# Patient Record
(demographics unavailable — no encounter records)

---

## 2024-10-14 NOTE — REVIEW OF SYSTEMS
[Fatigue] : fatigue [Diarrhea: Grade 0] : Diarrhea: Grade 0 [Joint Pain] : joint pain [Muscle Pain] : muscle pain [Hot Flashes] : hot flashes [Fever] : no fever [Chest Pain] : no chest pain [Palpitations] : no palpitations [Lower Ext Edema] : no lower extremity edema [Shortness Of Breath] : no shortness of breath [Cough] : no cough [Abdominal Pain] : no abdominal pain [Vomiting] : no vomiting [Constipation] : no constipation [Dysuria] : no dysuria [Skin Rash] : no skin rash [Difficulty Walking] : no difficulty walking

## 2024-10-14 NOTE — PHYSICAL EXAM
[Fully active, able to carry on all pre-disease performance without restriction] : Status 0 - Fully active, able to carry on all pre-disease performance without restriction [Normal] : affect appropriate [de-identified] : anicteric  [de-identified] : no edema

## 2024-10-14 NOTE — ASSESSMENT
[FreeTextEntry1] : 59 year old male with dileep 4+3 prostate adenocarcinoma with intraductal and cribriform features, PSA 6.23  in Feb 2024, PSMA PET positive nodes PSMA PET 5/14/24 showed 0.6x0.3cm left para-aortic/common iliac node SUV 7.0, a 5mm left common iliac node SUV 5.8, and a 0.6x0.6cm left external iliac node SUV 10.1. He has at least stage FARIDA prostate adenocarcinoma (up to [place] 7 (4+3). PSMA PET 5/14/24 showed several SUV positive sub-centimeter pelvic lymph nodes. This indicates very high risk feature for his newly diagnosed prostate cancer. The plan is to proceed with radiation and anti- hormone therapy. The recommended treatment approach in this setting (node positive disease) based on the STAMPEDE trial is adding 2 year abiraterone (+prednisone) to ADT plus RT as an option for patients with very high risk prostate cancer. Patient had been started on Orgovyx on 5/20/24, he continued for two weeks and then received first dose of Lupron on 6/5/24. He started abiraterone and prednisone in June 2024.  Recommend genetic testing given he has node positive disease. Based on his FRAX score 10 years of probability of hip fracture 0.5%, a 10-year probability of a major osteoporosis-related fracture 6%. He does not require DEXA scan and bisphosphate vs prolia.   Plan: Very high risk of prostate cancer with pelvic nodes - Continue Lupron [id number]. due today 9/4/24; next dose on 12/4/2024 -  Previously on Abiraterone 1000mg and Prednisone, however, was held on 7/1/24 for 2 weeks, in the context of Gr      II LFTs on 6/28 , , bili 1.6 prompting dose reduction to 750mg. - Continue Abiraterone 750mg. + Prednisone. - Reviewed abiraterone AEs with patient, including but not limited to: fatigue, leg edema, hypertension,     hypernatremia, hypokalemia, liver damage, hot flashes, decreased libido.  Transaminitis -  Reviewed 8/31/24 LFT with pt/spouse noting AST-59, ALT - 94, T.B - 1.4; will continue to monitor < GR 1 - Continue to monitor LFT in 3 weeks  Vasomotor Sx - Continue to follow with Radiation Oncology, Dr. Nielsen on September 12, 2024 @ 10AM]. - Continue to monitor vasomotor symptoms as patient does not wish to start medication at presently. -  Continue Cialis for decreased libido / erectile dysfunction, f/u Dr. Héctor Ren, denies gabapentin and duloxetin  RTC 3 weeks

## 2024-10-14 NOTE — HISTORY OF PRESENT ILLNESS
[Disease: _____________________] : Disease: [unfilled] [T: ___] : T[unfilled] [N: ___] : N[unfilled] [AJCC Stage: ____] : AJCC Stage: [unfilled] [de-identified] : 59 year old male who is presenting for initial medical oncology evaluation for prostate adenocarcinoma. He noted a significant rise in his PSA to 6.23 on 2/6/24 (previous 3.71 on 1/20/23 and 1.11 on 4/1/16). MRI 4/12/24 showed 4.4 x 2.9 x 4.1 cm = 27 cc gland, with dominant lesions in the left posterior medial-lateral (PZpm-pl) base-to-midgland peripheral zone (1.6 cm, GA-5), with concern for capsular involvement but no clear EPE, abutting the left NVB and SV without clear invasion, and left posterior medial (PZpm) [id number] peripheral zone lesion extending to the midline posterior urethra at the apex (0.9 cm, GA-4). No LAD. No major anatomic issues. Prostate biopsy 4/23/24 showed right posterior lateral base G 3+4 with 50% involvement, MRI target#1 left PZ G 4+3 among 3 cores with 60%, 55% and 55% involvement with intraductal component, MRI #2 target left PZ G 3+4 among 2 cores with 85% and 55% involvement with intraductal and cribriform component. (small prostate so limited number of sites per Dr. Umaña). PSMA PET 5/14/24 showed increased radiotracer activity in the left side of the prostate gland suggests intermediate PSA expression, with compatible metastatic disease indicated by 0.6x0.3cm left para-aortic/common iliac node SUV 7.0, a 5mm left common iliac node SUV 5.8, a 0.6x0.6cm left external iliac node SUV 10.1, PSA 7.96 and Testosterone on 272 5/20/24. He was started on Orgovyx by urology on 5/21/24   Interval History: Overall he has been doing well. He denies any issues with urination. No constipation or diarrhea. He reports that he is great. He remains active and goes to the gym 6 days per week, as well as, golf a couple days.    PMH/PSH: Appendectomy, hernia repair, testicular torsion repair, vasectomy, cervical spinal fusion; HTN (Edarbi, Amlodopine) /HLD (atorvastin)   Social: Never smoker, occasional alcohol use. 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 Has six children (age 31- 15 years)  Family History: Maternal Uncle with hx of bladder cancer    6/12/24: Patient is more fatigued than normal, says he that he normally goes to bed pretty early and is finding he wants to go to bed even earlier. He does note that since stopping  orgovyx and starting lupron and stephanie/pred the fatigue has actually improved. He was exhausted when taking orgovyx. He is having hot flashes throughout the day, says they are tolerable. Feels his metabolism may have slowed a bit, however has also been going to a lot of events (graduation parties etc) at which he is eating outside of his normal diet. He reports nocturia x 4, increased from 2-3. Endorses decreased libido, does find he is able to have erection with cialis. Has h/o cervical stenosis/fusion and had C2-C6 nerve block yesterday.   [de-identified] : G7(4+3) disease in intraductal component, 3/8 sampled sites  [de-identified] : 7/3/24: LFTs 6/28 gr II elevation, abiraterone held on 7/1, continued on prednisone. Overall patient has been feeling well, he has been motivated to exercise more due to the fact that he is on ADT and wants to counteract increased calorie intake / easier weight gain. He is doing his best to push through fatigue at certain times, but also takes a naps. He continues to follow with pain management and is planned for cervical nerve ablation. Ongoing vasomotor symptoms, says that a few nights ago he was up every hour with hot flashes, this is not the norm, discussed potential gabapentin, however patient has been on this medication previously and does not wish to start right now. Notes some urinary urgency and nocturia 2-4. Endorse little to no libido, does not think he has gotten spontaneous erections.    7/24/24 reports moderate fatigue, hot flash and sweating 10 times, loss of energy. Has normal urination.   8/16/24 pt is in general well-being. Appetite is good. maintaining weight. Exercise 1-2 times a day [work-out]. Continues to endorse moderate fatigue and drinks coffee which helps keep him active and about for about 1 hour. Hot flashes is a bit less but more often. No swelling or joint pain which he follows locally for management.  9/6/24 pt is doing well. Appetite is good and maintaining weight. BM is normal. Continues to exercise 1-2 times daily. Continues to endorse moderate fatigue and mild hot flash with occ sleep interruption.   10/14/24 reports mod fatigue, fluctuating hot flash and sweating

## 2024-10-28 NOTE — PROCEDURE
[FreeTextEntry1] : TRANSPERINEAL PLACEMENT OF SPACEOAR GEL AND MARKERS PLACEMENT  [FreeTextEntry2] :  IN PREPARATION FOR RADIATION TREATMENT FOR PROSTATE CANCER  [FreeTextEntry3] : Procedure Note:   Mario Reyna is a 60 year old patient with [place]jel[place] score 7 ( 4+3) adenocarcinoma of the prostate. He presents today for transperineal placement of Spaceoar gel and fiducials in preparation for radiation therapy for his prostate cancer treatment.    In preparation for the procedure, he self-administered an enema one hour before leaving home and was NPO the night before procedure. He was prescribed a 3-day course of oral antibiotics twice daily to be started a day prior to the procedure. Topical ANECREAM cream was applied to the perineal area 10 mins prior to the procedure. The patient was prescribed and took Valium 5 mg and Tylenol 650 mg upon arrival in the department one hour to procedure. Procedure risk and benefits were reviewed with patient and a written consent was obtained prior to procedure. A time out was observed with patient name, date of birth, procedure, position, and site verified.  The patient was placed in a lithotomy position. Chloral prep was used to prep the skin. While maintaining aseptic technique an ultrasound probe was inserted into the rectum to visualize the prostate. Less than 10 cc of Lidocaine 2% plus 8.4% sodium bicarbonate was injected subcutaneously. Afterwards, 20 cc of Lidocaine and sodium bicarbonate was injected internally at the prostate apex and bilateral neurovascular bundles for the nerve block.  Three fiducial markers were prepared on the sterile field. One fiducial marker was placed into each of the following sites: left lobe, right lobe and apex via 14 -gauge needles under ultrasound guidance.  Next, the hydrogel spacer kit was opened onto the sterile field and the hydrogel injection apparatus was prepared. An 18-gauge needle was positioned into the mid-line perirectal fat between the anterior rectal wall and prostate under ultrasound guidance. Less than 10 cc of saline was injected via the needle to hydro dissect the space and confirm proper placement in both axial and sagittal views. The syringe was aspirated to confirm the needle was extravascular. The syringe was replaced with the hydrogel injection apparatus and the gel was injected over about 10 seconds. The needle was then removed. There was minimal blood loss. The patient tolerated the procedure well.  The patient was transferred to the recovery area on a monitor. Vital signs were stable. He tolerated fluid and snacks by mouth and was made comfortable. He denied pain. Post procedure verbal and written instructions were given and reviewed with patient and wife. CT-Sim date and bowel prep reviewed with patient.They verbalized understanding of instructions. He was then discharged home in a stable condition, ambulatory and accompanied by wife.

## 2024-11-05 NOTE — HISTORY OF PRESENT ILLNESS
[FreeTextEntry1] : Mr. Maynard is a 60-year-old male who presents for follow up visit prior to SIM planning scan today as he had seen Dr. Nielsen but will get radiation therapy at [facility]. He is accompanied by his wife for today's treatment.   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 [place] score 4+3=7 in 1 site/3 cores, Carcinoma is seen involving adipose tissue, consistent with extraprostatic extension. Intraductal carcinoma of the prostate also present. Perineural invasion present. Anjelica score 3+4=7 in 2 sites/3 cores, Atypical intraductal proliferation (AIP) and Intraductal carcinoma of the prostate also present. 85% max involvement. Cribriform [place] pattern 4 is present.  PSA 6.23 ng/mL. PSMA PET with few subcentimeter retroperitoneal and left pelvic lymph nodes with increased radiotracer activity are compatible with metastatic disease.  TREATMENT: 5/21/24 - Received Orgovyx x 2 weeks  6/5/24 - started on Lupron/Eligard injections  6/2024 - started on Abiraterone/Prednisone (dose reduced due to elevated LFTs)  PSA Trend: 1/20/23 - 3.71 ng/mL 2/6/24 - 6.23 5/20/24 - 7.96 6/30/24 - 1.67 7/17/24 - 1.15 8/9/24 - 0.64 8/31/24 - 0.39 9/19/24 - 0.32 10/12/24 - 0.22 11/4/24 - 0.19  HPI:  Mr. Maynard originally presented to urology in March 2024 for evaluation of elevated PSA of 6.23 ng/mL. MRI ordered.   4/12/24 - Prostate MRI: Prostate volume 27 cc. PSA density: 0.23 ng/mL/mL. Lesion #1, Left, posterior medial-lateral (PZpm-pl), base-to-midgland, peripheral zone (16 x 9 x 15 mm). Tumor abuts capsule causing capsular irregularity. PIRADS 5 Lesion #2, Left, posterior medial (PZpm), afofsbux-[id number], peripheral zone lesion extends to the midline posterior urethra at the apex (7 x 3 x 9 mm). No extraprostatic extension noted. PIRADS 4 Lesion 1 abuts the left neurovascular bundle. Lesion 1 abuts the left seminal vesicle without definite invasion. No pelvic lymphadenopathy and no suspicious bone lesions identified.    4/23/24 - underwent Prostate Biopsy: Pathology - Adenocarcinoma of Prostate, 3/8 sites and 6/11 cores involved.  Anjelica score 4+3=7 in 1 site/3 cores (MRI target lesion #1), Carcinoma is seen involving adipose tissue, consistent with extraprostatic extension. Intraductal carcinoma of the prostate also present. Perineural invasion present.  [place] score 3+4=7 in 2 sites/3 cores (MRI target lesion and right posterior lateral base), Atypical intraductal proliferation (AIP) and Intraductal carcinoma of the prostate also present. 85% max involvement.  Note: Cribriform [place] pattern 4 is present.    Mr. Maynard saw Dr. Umaña (urology) in follow up and biopsy results were reviewed.  Reviewed that definitive treatment would be recommended given the biopsy results.  PSMA PET ordered. Referrals to urology surgeon and radiation oncology made.  He then was seen by Dr. Pollock (urology) and Dr. Diamond ([facility]) in multidisciplinary clinic on 5/9/24, both treatment options were reviewed in detail.  Final decision about treatment to be made after PSMA PET.   5/14/24 - PSMA PET: 1. Foci of increased radiotracer activity in left side of prostate gland, extending from apex to base, corresponding to lesions identified on MRI/biopsy-proven prostate carcinoma. The intensity of radiotracer activity suggests intermediate PSMA expression. 2. Few subcentimeter retroperitoneal and left pelvic lymph nodes with increased radiotracer activity are compatible with metastatic disease.  5/22/24 - saw Dr. Rice ([facility]) in consultation. Discussed all imaging and pathology results.  He has newly diagnosed at least stage FARIDA prostate adenocarcinoma (up to [place]jel[place] 7 (4+3). PSMA PET 5/14/24 showed several SUV positive sub-centimeter pelvic lymph nodes. This indicates very high-risk feature for his newly diagnosed prostate cancer. The plan is to proceed with radiation and anti- hormone therapy. The recommended treatment approach in this setting (node positive disease) based on the STAMPEDE trial is adding 2-year abiraterone (+prednisone) to ADT plus RT as an option for patients with very high-risk prostate cancer.    6/2024 - started on Lupron/Eligard injections and Abiraterone/Prednisone with Dr. Rice ([facility])  7/1/24 - saw Dr. Nielsen ([facility]) in consultation as Dr. Diamond had left the practice.  Recommended external beam radiation therapy to the prostate, in addition to continuing hormone therapy, based on the STAMPEDE study findings. The radiation therapy would be given for 5 weeks (25 treatments) after observing the PSA levels on hormone therapy (typically 4-6 months). Will coordinate with the radiation oncology team at the [facility] in [place] for the patient's radiation treatment.    10/29/24 - Had Prostate Fiducial Markers/SpaceOAR Gel placed by Dr. Nielsen ([facility]) in preparation for radiation therapy.    11/5/24 - presents for follow up visit prior to SIM planning scan as he is to receive his radiation at [facility].  Mr. Maynard is feeling well today, appropriately anxious about next steps. He has nocturia of 4x (wakes due to hot flashes so urinates as well), frequency, and occasional urgency. No bowel issues, last colonoscopy was within the past year.  Poor erectile function has seen Dr. Ren. He continues to follow with Dr. Rice ([facility]) and is on Lupron injections and Abiraterone/Prednisone, as per patient told to hold Abiraterone due to elevated LFTs.  He is having hot flashes, decreased energy, and some depression since starting on Lupron injections and Abiraterone/Prednisone. He is looking forward to a trip to [place] at the end of December.

## 2024-11-05 NOTE — REVIEW OF SYSTEMS
[Fatigue] : fatigue [Loss of Hearing] : loss of hearing [Nocturia] : nocturia [Urinary Frequency] : urinary frequency [IPSS Score (0-40): ___] : IPSS score: [unfilled] [EPIC-CP Score (0-60): ___] : EPIC-CP score: [unfilled] [Joint Pain] : joint pain [Anxiety] : anxiety [Depression] : depression [Hot Flashes] : hot flashes [Negative] : Heme/Lymph [Constipation: Grade 0] : Constipation: Grade 0 [Diarrhea: Grade 0] : Diarrhea: Grade 0 [Hematuria: Grade 0] : Hematuria: Grade 0 [Urinary Incontinence: Grade 0] : Urinary Incontinence: Grade 0  [Urinary Retention: Grade 0] : Urinary Retention: Grade 0 [Urinary Tract Pain: Grade 0] : Urinary Tract Pain: Grade 0 [Urinary Urgency: Grade 0] : Urinary Urgency: Grade 0 [Urinary Frequency: Grade 1 - Present] : Urinary Frequency: Grade 1 - Present [Erectile Dysfunction: Grade 2 - Decrease in erectile function (frequency/rigidity of erections), erectile intervention indicated, (e.g., medication or mechanical devices such as penile pump)] : Erectile Dysfunction: Grade 2 - Decrease in erectile function (frequency/rigidity of erections), erectile intervention indicated, (e.g., medication or mechanical devices such as penile pump) [Ejaculation Disorder: Grade 2 - Anejaculation or retrograde ejaculation] : Ejaculation Disorder: Grade 2 - Anejaculation or retrograde ejaculation [Suicidal] : not suicidal [FreeTextEntry3] : wears contact lenses  [FreeTextEntry4] : loss of hearing left ear  [FreeTextEntry8] : QOL 3  [FreeTextEntry9] : chornic  [de-identified] : situational, especially with new diagnosis  [FreeTextEntry5] : occasional

## 2024-11-05 NOTE — PHYSICAL EXAM
[Sclera] : the sclera and conjunctiva were normal [Outer Ear] : the ears and nose were normal in appearance [Normal] : no respiratory distress, lungs were clear to auscultation bilaterally

## 2024-11-05 NOTE — DISEASE MANAGEMENT
[0-10] : 0 -10 ng/mL [Biopsy with Fusion] : Patient had a biopsy with fusion on [7(3+4)] : Template Biopsy [place] Score: 7(3+4) [7(4+3)] : Fusion Biopsy [place] Score: 7(4+3) [] : Patient had a Prostate MRI [5] : 5 [Treatment with androgen ablation] : Treatment with androgen ablation [FARIDA] : FARIDA [BiopsyDate] : 04/23/2024 [MeasuredProstateVolume] : 27 [TotalCores] : 11 [TotalPositiveCores] : 6 [MaxCoreInvolvement] : 85 [ADTStartedDate] : 6/2024

## 2024-11-05 NOTE — DISEASE MANAGEMENT
[0-10] : 0 -10 ng/mL [Biopsy with Fusion] : Patient had a biopsy with fusion on [7(3+4)] : Template Biopsy [place] Score: 7(3+4) [7(4+3)] : Fusion Biopsy [place] Score: 7(4+3) [] : Patient had a Prostate MRI [5] : 5 [Treatment with androgen ablation] : Treatment with androgen ablation [FARIDA] : FARIDA [BiopsyDate] : 04/23/2024 [MeasuredProstateVolume] : 27 [TotalCores] : 11 [TotalPositiveCores] : 6 [MaxCoreInvolvement] : 85 [ADTStartedDate] : 6/2024

## 2024-11-05 NOTE — REVIEW OF SYSTEMS
[Fatigue] : fatigue [Loss of Hearing] : loss of hearing [Nocturia] : nocturia [Urinary Frequency] : urinary frequency [IPSS Score (0-40): ___] : IPSS score: [unfilled] [EPIC-CP Score (0-60): ___] : EPIC-CP score: [unfilled] [Joint Pain] : joint pain [Anxiety] : anxiety [Depression] : depression [Hot Flashes] : hot flashes [Negative] : Heme/Lymph [Constipation: Grade 0] : Constipation: Grade 0 [Diarrhea: Grade 0] : Diarrhea: Grade 0 [Hematuria: Grade 0] : Hematuria: Grade 0 [Urinary Incontinence: Grade 0] : Urinary Incontinence: Grade 0  [Urinary Retention: Grade 0] : Urinary Retention: Grade 0 [Urinary Tract Pain: Grade 0] : Urinary Tract Pain: Grade 0 [Urinary Urgency: Grade 0] : Urinary Urgency: Grade 0 [Urinary Frequency: Grade 1 - Present] : Urinary Frequency: Grade 1 - Present [Erectile Dysfunction: Grade 2 - Decrease in erectile function (frequency/rigidity of erections), erectile intervention indicated, (e.g., medication or mechanical devices such as penile pump)] : Erectile Dysfunction: Grade 2 - Decrease in erectile function (frequency/rigidity of erections), erectile intervention indicated, (e.g., medication or mechanical devices such as penile pump) [Ejaculation Disorder: Grade 2 - Anejaculation or retrograde ejaculation] : Ejaculation Disorder: Grade 2 - Anejaculation or retrograde ejaculation [Suicidal] : not suicidal [FreeTextEntry3] : wears contact lenses  [FreeTextEntry4] : loss of hearing left ear  [FreeTextEntry8] : QOL 3  [FreeTextEntry9] : chornic  [de-identified] : situational, especially with new diagnosis  [FreeTextEntry5] : occasional

## 2024-11-05 NOTE — HISTORY OF PRESENT ILLNESS
[FreeTextEntry1] : Mr. Maynard is a 60-year-old male who presents for follow up visit prior to SIM planning scan today as he had seen Dr. Nielsen but will get radiation therapy at [facility]. He is accompanied by his wife for today's treatment.   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 [place] score 4+3=7 in 1 site/3 cores, Carcinoma is seen involving adipose tissue, consistent with extraprostatic extension. Intraductal carcinoma of the prostate also present. Perineural invasion present. [place] score 3+4=7 in 2 sites/3 cores, Atypical intraductal proliferation (AIP) and Intraductal carcinoma of the prostate also present. 85% max involvement. Cribriform [place] pattern 4 is present.  PSA 6.23 ng/mL. PSMA PET with few subcentimeter retroperitoneal and left pelvic lymph nodes with increased radiotracer activity are compatible with metastatic disease.  TREATMENT: 5/21/24 - Received Orgovyx x 2 weeks  6/5/24 - started on Lupron/Eligard injections  6/2024 - started on Abiraterone/Prednisone (dose reduced due to elevated LFTs)  PSA Trend: 1/20/23 - 3.71 ng/mL 2/6/24 - 6.23 5/20/24 - 7.96 6/30/24 - 1.67 7/17/24 - 1.15 8/9/24 - 0.64 8/31/24 - 0.39 9/19/24 - 0.32 10/12/24 - 0.22 11/4/24 - 0.19  HPI:  Mr. Maynard originally presented to urology in March 2024 for evaluation of elevated PSA of 6.23 ng/mL. MRI ordered.   4/12/24 - Prostate MRI: Prostate volume 27 cc. PSA density: 0.23 ng/mL/mL. Lesion #1, Left, posterior medial-lateral (PZpm-pl), base-to-midgland, peripheral zone (16 x 9 x 15 mm). Tumor abuts capsule causing capsular irregularity. PIRADS 5 Lesion #2, Left, posterior medial (PZpm), [id number], peripheral zone lesion extends to the midline posterior urethra at the apex (7 x 3 x 9 mm). No extraprostatic extension noted. PIRADS 4 Lesion 1 abuts the left neurovascular bundle. Lesion 1 abuts the left seminal vesicle without definite invasion. No pelvic lymphadenopathy and no suspicious bone lesions identified.    4/23/24 - underwent Prostate Biopsy: Pathology - Adenocarcinoma of Prostate, 3/8 sites and 6/11 cores involved.  [place] score 4+3=7 in 1 site/3 cores (MRI target lesion #1), Carcinoma is seen involving adipose tissue, consistent with extraprostatic extension. Intraductal carcinoma of the prostate also present. Perineural invasion present.  [place] score 3+4=7 in 2 sites/3 cores (MRI target lesion and right posterior lateral base), Atypical intraductal proliferation (AIP) and Intraductal carcinoma of the prostate also present. 85% max involvement.  Note: Cribriform [place] pattern 4 is present.    Mr. Maynard saw Dr. Umaña (urology) in follow up and biopsy results were reviewed.  Reviewed that definitive treatment would be recommended given the biopsy results.  PSMA PET ordered. Referrals to urology surgeon and radiation oncology made.  He then was seen by Dr. Pollock (urology) and Dr. Diamond ([facility]) in multidisciplinary clinic on 5/9/24, both treatment options were reviewed in detail.  Final decision about treatment to be made after PSMA PET.   5/14/24 - PSMA PET: 1. Foci of increased radiotracer activity in left side of prostate gland, extending from apex to base, corresponding to lesions identified on MRI/biopsy-proven prostate carcinoma. The intensity of radiotracer activity suggests intermediate PSMA expression. 2. Few subcentimeter retroperitoneal and left pelvic lymph nodes with increased radiotracer activity are compatible with metastatic disease.  5/22/24 - saw Dr. Rice ([facility]) in consultation. Discussed all imaging and pathology results.  He has newly diagnosed at least stage FARIDA prostate adenocarcinoma (up to [place] 7 (4+3). PSMA PET 5/14/24 showed several SUV positive sub-centimeter pelvic lymph nodes. This indicates very high-risk feature for his newly diagnosed prostate cancer. The plan is to proceed with radiation and anti- hormone therapy. The recommended treatment approach in this setting (node positive disease) based on the STAMPEDE trial is adding 2-year abiraterone (+prednisone) to ADT plus RT as an option for patients with very high-risk prostate cancer.    6/2024 - started on Lupron/Eligard injections and Abiraterone/Prednisone with Dr. Rice ([facility])  7/1/24 - saw Dr. Nielsen ([facility]) in consultation as Dr. Diamond had left the practice.  Recommended external beam radiation therapy to the prostate, in addition to continuing hormone therapy, based on the STAMPEDE study findings. The radiation therapy would be given for 5 weeks (25 treatments) after observing the PSA levels on hormone therapy (typically 4-6 months). Will coordinate with the radiation oncology team at the [facility] in [place] for the patient's radiation treatment.    10/29/24 - Had Prostate Fiducial Markers/SpaceOAR Gel placed by Dr. Nielsen ([facility]) in preparation for radiation therapy.    11/5/24 - presents for follow up visit prior to SIM planning scan as he is to receive his radiation at [facility].  Mr. Maynard is feeling well today, appropriately anxious about next steps. He has nocturia of 4x (wakes due to hot flashes so urinates as well), frequency, and occasional urgency. No bowel issues, last colonoscopy was within the past year.  Poor erectile function has seen Dr. Ren. He continues to follow with Dr. Rice ([facility]) and is on Lupron injections and Abiraterone/Prednisone, as per patient told to hold Abiraterone due to elevated LFTs.  He is having hot flashes, decreased energy, and some depression since starting on Lupron injections and Abiraterone/Prednisone. He is looking forward to a trip to [place] at the end of December.

## 2024-11-07 NOTE — REVIEW OF SYSTEMS
[Fatigue] : fatigue [Lower Ext Edema] : lower extremity edema [Diarrhea: Grade 0] : Diarrhea: Grade 0 [Hot Flashes] : hot flashes [Recent Change In Weight] : ~T recent weight change [Depression] : depression [Fever] : no fever [Chills] : no chills [Chest Pain] : no chest pain [Palpitations] : no palpitations [Shortness Of Breath] : no shortness of breath [Cough] : no cough [Abdominal Pain] : no abdominal pain [Vomiting] : no vomiting [Constipation] : no constipation [Dysuria] : no dysuria [Incontinence] : no incontinence [Joint Pain] : no joint pain [Joint Stiffness] : no joint stiffness [Muscle Pain] : no muscle pain [Muscle Weakness] : no muscle weakness [Dizziness] : no dizziness [Suicidal] : not suicidal [Easy Bleeding] : no tendency for easy bleeding [Easy Bruising] : no tendency for easy bruising

## 2024-11-07 NOTE — ASSESSMENT
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 He has at least stage FARIDA prostate adenocarcinoma (up to [place] 7 (4+3). PSMA PET 5/14/24 showed several SUV positive sub-centimeter pelvic lymph nodes. This indicates very high risk feature for his newly diagnosed prostate cancer. The plan is to proceed with radiation and anti- hormone therapy. The recommended treatment approach in this setting (node positive disease) based on the STAMPEDE trial is adding 2 year abiraterone (+prednisone) to ADT plus RT as an option for patients with very high risk prostate cancer. Patient had been started on Orgovyx on 5/20/24, he continued for two weeks and then received first dose of Lupron on 6/5/24. He started abiraterone and prednisone in June 2024.  Recommend genetic testing given he has node positive disease. Based on his FRAX score 10 years of probability of hip fracture 0.5%, a 10-year probability of a major osteoporosis-related fracture 6%. He does not require DEXA scan and bisphosphate vs prolia.   Plan: Very high risk of prostate cancer with pelvic nodes - Continue Lupron [id number], last given 9/6/24, next scheduled for 12/4/24.  - Per d/w Dr. Rice, given ALT >20x ULN, will permanently discontinue abiraterone. Will check labs weekly starting 11/11 to monitor resolution of transaminitis as below. Instructed to continue prednisone 5mg daily for now.  - Reviewed abiraterone AEs with patient, including but not limited to: fatigue, leg edema, hypertension, hypernatremia, hypokalemia, liver damage, hot flashes, decreased libido. - Continue to follow with Dr. Angel of Radiation Oncology, planning for RT to the prostate and pelvic LNs in the near future.   Transaminitis -  Reviewed 8/31/24 LFT with pt/spouse noting AST-59, ALT - 94, T.B - 1.4; will continue to monitor < GR 1 - 11/4/24 AST = 467, ALT = 1,0006 - grade 4 - Hold abiraterone and statin. He is going to [place] this Thurs, will check weekly CMP starting 11/11/24 to monitor for resolution of transaminitis.   Depression: - Uncle passed away, another family member in the ICU. Having episodes of crying and feeling depressed, no SI.  - He has concerns about medication for depression as he was previously on Cymbalta for radicular neck pain, he recalls extreme difficulty with tapering the medication d/t withdrawal effects.  - Extensive emotional support provided today. We discussed that there are various types of antidepressants, not all will cause the same effects. He would benefit from referral to psych-oncology, pt is agreeable and I have emailed them for an appt with both psychology and psychiatry.   Vasomotor Sx - Hot flashes - tolerable, no indication for pharmacologic intervention at this time.  - Continue Cialis for decreased libido / erectile dysfunction, f/u Dr. Héctor Ren  Instructed to contact our office with any new/worsening symptoms. Pt and wife educated regarding plan of care, all questions/concerns addressed to the best of my abilities and their apparent satisfaction. F/u in 3wks

## 2024-11-07 NOTE — ASSESSMENT
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 He has at least stage FARIDA prostate adenocarcinoma (up to [place] 7 (4+3). PSMA PET 5/14/24 showed several SUV positive sub-centimeter pelvic lymph nodes. This indicates very high risk feature for his newly diagnosed prostate cancer. The plan is to proceed with radiation and anti- hormone therapy. The recommended treatment approach in this setting (node positive disease) based on the STAMPEDE trial is adding 2 year abiraterone (+prednisone) to ADT plus RT as an option for patients with very high risk prostate cancer. Patient had been started on Orgovyx on 5/20/24, he continued for two weeks and then received first dose of Lupron on 6/5/24. He started abiraterone and prednisone in June 2024.  Recommend genetic testing given he has node positive disease. Based on his FRAX score 10 years of probability of hip fracture 0.5%, a 10-year probability of a major osteoporosis-related fracture 6%. He does not require DEXA scan and bisphosphate vs prolia.   Plan: Very high risk of prostate cancer with pelvic nodes - Continue Lupron [id number], last given 9/6/24, next scheduled for 12/4/24.  - Per d/w Dr. Rice, given ALT >20x ULN, will permanently discontinue abiraterone. Will check labs weekly starting 11/11 to monitor resolution of transaminitis as below. Instructed to continue prednisone 5mg daily for now.  - Reviewed abiraterone AEs with patient, including but not limited to: fatigue, leg edema, hypertension, hypernatremia, hypokalemia, liver damage, hot flashes, decreased libido. - Continue to follow with Dr. Angel of Radiation Oncology, planning for RT to the prostate and pelvic LNs in the near future.   Transaminitis -  Reviewed 8/31/24 LFT with pt/spouse noting AST-59, ALT - 94, T.B - 1.4; will continue to monitor < GR 1 - 11/4/24 AST = 467, ALT = 1,0006 - grade 4 - Hold abiraterone and statin. He is going to [place] this Thurs, will check weekly CMP starting 11/11/24 to monitor for resolution of transaminitis.   Depression: - Uncle passed away, another family member in the ICU. Having episodes of crying and feeling depressed, no SI.  - He has concerns about medication for depression as he was previously on Cymbalta for radicular neck pain, he recalls extreme difficulty with tapering the medication d/t withdrawal effects.  - Extensive emotional support provided today. We discussed that there are various types of antidepressants, not all will cause the same effects. He would benefit from referral to psych-oncology, pt is agreeable and I have emailed them for an appt with both psychology and psychiatry.   Vasomotor Sx - Hot flashes - tolerable, no indication for pharmacologic intervention at this time.  - Continue Cialis for decreased libido / erectile dysfunction, f/u Dr. Héctor Ren  Instructed to contact our office with any new/worsening symptoms. Pt and wife educated regarding plan of care, all questions/concerns addressed to the best of my abilities and their apparent satisfaction. F/u in 3wks

## 2024-11-07 NOTE — PHYSICAL EXAM
[Restricted in physically strenuous activity but ambulatory and able to carry out work of a light or sedentary nature] : Status 1- Restricted in physically strenuous activity but ambulatory and able to carry out work of a light or sedentary nature, e.g., light house work, office work [Normal] : affect appropriate [de-identified] : anicteric  [de-identified] : no LE edema

## 2024-11-07 NOTE — HISTORY OF PRESENT ILLNESS
[Disease: _____________________] : Disease: [unfilled] [T: ___] : T[unfilled] [N: ___] : N[unfilled] [AJCC Stage: ____] : AJCC Stage: [unfilled] [de-identified] : 59 year old male who is presenting for initial medical oncology evaluation for prostate adenocarcinoma. He noted a significant rise in his PSA to 6.23 on 2/6/24 (previous 3.71 on 1/20/23 and 1.11 on 4/1/16). MRI 4/12/24 showed 4.4 x 2.9 x 4.1 cm = 27 cc gland, with dominant lesions in the left posterior medial-lateral (PZpm-pl) base-to-midgland peripheral zone (1.6 cm, IL-5), with concern for capsular involvement but no clear EPE, abutting the left NVB and SV without clear invasion, and left posterior medial (PZpm) [id number] peripheral zone lesion extending to the midline posterior urethra at the apex (0.9 cm, IL-4). No LAD. No major anatomic issues. Prostate biopsy 4/23/24 showed right posterior lateral base G 3+4 with 50% involvement, MRI target#1 left PZ G 4+3 among 3 cores with 60%, 55% and 55% involvement with intraductal component, MRI #2 target left PZ G 3+4 among 2 cores with 85% and 55% involvement with intraductal and cribriform component. (small prostate so limited number of sites per Dr. Umaña). PSMA PET 5/14/24 showed increased radiotracer activity in the left side of the prostate gland suggests intermediate PSA expression, with compatible metastatic disease indicated by 0.6x0.3cm left para-aortic/common iliac node SUV 7.0, a 5mm left common iliac node SUV 5.8, a 0.6x0.6cm left external iliac node SUV 10.1, PSA 7.96 and Testosterone on 272 5/20/24. He was started on Orgovyx by urology on 5/21/24   Interval History: Overall he has been doing well. He denies any issues with urination. No constipation or diarrhea. He reports that he is great. He remains active and goes to the gym 6 days per week, as well as, golf a couple days.    PMH/PSH: Appendectomy, hernia repair, testicular torsion repair, vasectomy, cervical spinal fusion; HTN (Edarbi, Amlodopine) /HLD (atorvastin)   Social: Never smoker, occasional alcohol use. 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 Has six children (age 31- 15 years)  Family History: Maternal Uncle with hx of bladder cancer    6/12/24: Patient is more fatigued than normal, says he that he normally goes to bed pretty early and is finding he wants to go to bed even earlier. He does note that since stopping  orgovyx and starting lupron and stephanie/pred the fatigue has actually improved. He was exhausted when taking orgovyx. He is having hot flashes throughout the day, says they are tolerable. Feels his metabolism may have slowed a bit, however has also been going to a lot of events (graduation parties etc) at which he is eating outside of his normal diet. He reports nocturia x 4, increased from 2-3. Endorses decreased libido, does find he is able to have erection with cialis. Has h/o cervical stenosis/fusion and had C2-C6 nerve block yesterday.   [de-identified] : G7(4+3) disease in intraductal component, 3/8 sampled sites  [de-identified] : 7/3/24: LFTs 6/28 gr II elevation, abiraterone held on 7/1, continued on prednisone. Overall patient has been feeling well, he has been motivated to exercise more due to the fact that he is on ADT and wants to counteract increased calorie intake / easier weight gain. He is doing his best to push through fatigue at certain times, but also takes a naps. He continues to follow with pain management and is planned for cervical nerve ablation. Ongoing vasomotor symptoms, says that a few nights ago he was up every hour with hot flashes, this is not the norm, discussed potential gabapentin, however patient has been on this medication previously and does not wish to start right now. Notes some urinary urgency and nocturia 2-4. Endorse little to no libido, does not think he has gotten spontaneous erections.    7/24/24 reports moderate fatigue, hot flash and sweating 10 times, loss of energy. Has normal urination.   8/16/24 pt is in general well-being. Appetite is good. maintaining weight. Exercise 1-2 times a day [work-out]. Continues to endorse moderate fatigue and drinks coffee which helps keep him active and about for about 1 hour. Hot flashes is a bit less but more often. No swelling or joint pain which he follows locally for management.  9/6/24 pt is doing well. Appetite is good and maintaining weight. BM is normal. Continues to exercise 1-2 times daily. Continues to endorse moderate fatigue and mild hot flash with occ sleep interruption.   10/14/24 reports mod fatigue, fluctuating hot flash and sweating  11/5/24 - continues on abiraterone 750mg daily. Overall feeling fatigued, depressed, gaining weight. Uncle passed away, another family member in the ICU. Having episodes of crying and feeling depressed, no SI. Ongoing hot flashes. Urine flow is "fine", no urgency, nocturia 3-5x/night. Occasional BLE edema. Had the spacer placed for RT, more discomfort. Denies fever, chills, night sweats, headache, dizziness, chest pain, palpitations, SOB, cough, nausea/vomiting, diarrhea/constipation, abdominal pain, dysuria, hematuria, incontinence, bleeding, muscle or joint pain/weakness.

## 2024-11-07 NOTE — ASSESSMENT
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 He has at least stage FARIDA prostate adenocarcinoma (up to [place] 7 (4+3). PSMA PET 5/14/24 showed several SUV positive sub-centimeter pelvic lymph nodes. This indicates very high risk feature for his newly diagnosed prostate cancer. The plan is to proceed with radiation and anti- hormone therapy. The recommended treatment approach in this setting (node positive disease) based on the STAMPEDE trial is adding 2 year abiraterone (+prednisone) to ADT plus RT as an option for patients with very high risk prostate cancer. Patient had been started on Orgovyx on 5/20/24, he continued for two weeks and then received first dose of Lupron on 6/5/24. He started abiraterone and prednisone in June 2024.  Recommend genetic testing given he has node positive disease. Based on his FRAX score 10 years of probability of hip fracture 0.5%, a 10-year probability of a major osteoporosis-related fracture 6%. He does not require DEXA scan and bisphosphate vs prolia.   Plan: Very high risk of prostate cancer with pelvic nodes - Continue Lupron [id number], last given 9/6/24, next scheduled for 12/4/24.  - Per d/w Dr. Rice, given ALT >20x ULN, will permanently discontinue abiraterone. Will check labs weekly starting 11/11 to monitor resolution of transaminitis as below. Instructed to continue prednisone 5mg daily for now.  - Reviewed abiraterone AEs with patient, including but not limited to: fatigue, leg edema, hypertension, hypernatremia, hypokalemia, liver damage, hot flashes, decreased libido. - Continue to follow with Dr. Angel of Radiation Oncology, planning for RT to the prostate and pelvic LNs in the near future.   Transaminitis -  Reviewed 8/31/24 LFT with pt/spouse noting AST-59, ALT - 94, T.B - 1.4; will continue to monitor < GR 1 - 11/4/24 AST = 467, ALT = 1,0006 - grade 4 - Hold abiraterone and statin. He is going to [place] this Thurs, will check weekly CMP starting 11/11/24 to monitor for resolution of transaminitis.   Depression: - Uncle passed away, another family member in the ICU. Having episodes of crying and feeling depressed, no SI.  - He has concerns about medication for depression as he was previously on Cymbalta for radicular neck pain, he recalls extreme difficulty with tapering the medication d/t withdrawal effects.  - Extensive emotional support provided today. We discussed that there are various types of antidepressants, not all will cause the same effects. He would benefit from referral to psych-oncology, pt is agreeable and I have emailed them for an appt with both psychology and psychiatry.   Vasomotor Sx - Hot flashes - tolerable, no indication for pharmacologic intervention at this time.  - Continue Cialis for decreased libido / erectile dysfunction, f/u Dr. Héctor Ren  Instructed to contact our office with any new/worsening symptoms. Pt and wife educated regarding plan of care, all questions/concerns addressed to the best of my abilities and their apparent satisfaction. F/u in 3wks

## 2024-11-07 NOTE — ASSESSMENT
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 He has at least stage FARIDA prostate adenocarcinoma (up to [place] 7 (4+3). PSMA PET 5/14/24 showed several SUV positive sub-centimeter pelvic lymph nodes. This indicates very high risk feature for his newly diagnosed prostate cancer. The plan is to proceed with radiation and anti- hormone therapy. The recommended treatment approach in this setting (node positive disease) based on the STAMPEDE trial is adding 2 year abiraterone (+prednisone) to ADT plus RT as an option for patients with very high risk prostate cancer. Patient had been started on Orgovyx on 5/20/24, he continued for two weeks and then received first dose of Lupron on 6/5/24. He started abiraterone and prednisone in June 2024.  Recommend genetic testing given he has node positive disease. Based on his FRAX score 10 years of probability of hip fracture 0.5%, a 10-year probability of a major osteoporosis-related fracture 6%. He does not require DEXA scan and bisphosphate vs prolia.   Plan: Very high risk of prostate cancer with pelvic nodes - Continue Lupron [id number], last given 9/6/24, next scheduled for 12/4/24.  - Per d/w Dr. Rice, given ALT >20x ULN, will permanently discontinue abiraterone. Will check labs weekly starting 11/11 to monitor resolution of transaminitis as below. Instructed to continue prednisone 5mg daily for now.  - Reviewed abiraterone AEs with patient, including but not limited to: fatigue, leg edema, hypertension, hypernatremia, hypokalemia, liver damage, hot flashes, decreased libido. - Continue to follow with Dr. Angel of Radiation Oncology, planning for RT to the prostate and pelvic LNs in the near future.   Transaminitis -  Reviewed 8/31/24 LFT with pt/spouse noting AST-59, ALT - 94, T.B - 1.4; will continue to monitor < GR 1 - 11/4/24 AST = 467, ALT = 1,0006 - grade 4 - Hold abiraterone and statin. He is going to [place] this Thurs, will check weekly CMP starting 11/11/24 to monitor for resolution of transaminitis.   Depression: - Uncle passed away, another family member in the ICU. Having episodes of crying and feeling depressed, no SI.  - He has concerns about medication for depression as he was previously on Cymbalta for radicular neck pain, he recalls extreme difficulty with tapering the medication d/t withdrawal effects.  - Extensive emotional support provided today. We discussed that there are various types of antidepressants, not all will cause the same effects. He would benefit from referral to psych-oncology, pt is agreeable and I have emailed them for an appt with both psychology and psychiatry.   Vasomotor Sx - Hot flashes - tolerable, no indication for pharmacologic intervention at this time.  - Continue Cialis for decreased libido / erectile dysfunction, f/u Dr. Héctor Ren  Instructed to contact our office with any new/worsening symptoms. Pt and wife educated regarding plan of care, all questions/concerns addressed to the best of my abilities and their apparent satisfaction. F/u in 3wks

## 2024-11-07 NOTE — HISTORY OF PRESENT ILLNESS
[Disease: _____________________] : Disease: [unfilled] [T: ___] : T[unfilled] [N: ___] : N[unfilled] [AJCC Stage: ____] : AJCC Stage: [unfilled] [de-identified] : 59 year old male who is presenting for initial medical oncology evaluation for prostate adenocarcinoma. He noted a significant rise in his PSA to 6.23 on 2/6/24 (previous 3.71 on 1/20/23 and 1.11 on 4/1/16). MRI 4/12/24 showed 4.4 x 2.9 x 4.1 cm = 27 cc gland, with dominant lesions in the left posterior medial-lateral (PZpm-pl) base-to-midgland peripheral zone (1.6 cm, HI-5), with concern for capsular involvement but no clear EPE, abutting the left NVB and SV without clear invasion, and left posterior medial (PZpm) trlebawx-ub-qofo peripheral zone lesion extending to the midline posterior urethra at the apex (0.9 cm, HI-4). No LAD. No major anatomic issues. Prostate biopsy 4/23/24 showed right posterior lateral base G 3+4 with 50% involvement, MRI target#1 left PZ G 4+3 among 3 cores with 60%, 55% and 55% involvement with intraductal component, MRI #2 target left PZ G 3+4 among 2 cores with 85% and 55% involvement with intraductal and cribriform component. (small prostate so limited number of sites per Dr. Umaña). PSMA PET 5/14/24 showed increased radiotracer activity in the left side of the prostate gland suggests intermediate PSA expression, with compatible metastatic disease indicated by 0.6x0.3cm left para-aortic/common iliac node SUV 7.0, a 5mm left common iliac node SUV 5.8, a 0.6x0.6cm left external iliac node SUV 10.1, PSA 7.96 and Testosterone on 272 5/20/24. He was started on Orgovyx by urology on 5/21/24   Interval History: Overall he has been doing well. He denies any issues with urination. No constipation or diarrhea. He reports that he is great. He remains active and goes to the gym 6 days per week, as well as, golf a couple days.    PMH/PSH: Appendectomy, hernia repair, testicular torsion repair, vasectomy, cervical spinal fusion; HTN (Edarbi, Amlodopine) /HLD (atorvastin)   Social: Never smoker, occasional alcohol use. 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 Has six children (age 31- 15 years)  Family History: Maternal Uncle with hx of bladder cancer    6/12/24: Patient is more fatigued than normal, says he that he normally goes to bed pretty early and is finding he wants to go to bed even earlier. He does note that since stopping  orgovyx and starting lupron and stephanie/pred the fatigue has actually improved. He was exhausted when taking orgovyx. He is having hot flashes throughout the day, says they are tolerable. Feels his metabolism may have slowed a bit, however has also been going to a lot of events (graduation parties etc) at which he is eating outside of his normal diet. He reports nocturia x 4, increased from 2-3. Endorses decreased libido, does find he is able to have erection with cialis. Has h/o cervical stenosis/fusion and had C2-C6 nerve block yesterday.   [de-identified] : G7(4+3) disease in intraductal component, 3/8 sampled sites  [de-identified] : 7/3/24: LFTs 6/28 gr II elevation, abiraterone held on 7/1, continued on prednisone. Overall patient has been feeling well, he has been motivated to exercise more due to the fact that he is on ADT and wants to counteract increased calorie intake / easier weight gain. He is doing his best to push through fatigue at certain times, but also takes a naps. He continues to follow with pain management and is planned for cervical nerve ablation. Ongoing vasomotor symptoms, says that a few nights ago he was up every hour with hot flashes, this is not the norm, discussed potential gabapentin, however patient has been on this medication previously and does not wish to start right now. Notes some urinary urgency and nocturia 2-4. Endorse little to no libido, does not think he has gotten spontaneous erections.    7/24/24 reports moderate fatigue, hot flash and sweating 10 times, loss of energy. Has normal urination.   8/16/24 pt is in general well-being. Appetite is good. maintaining weight. Exercise 1-2 times a day [work-out]. Continues to endorse moderate fatigue and drinks coffee which helps keep him active and about for about 1 hour. Hot flashes is a bit less but more often. No swelling or joint pain which he follows locally for management.  9/6/24 pt is doing well. Appetite is good and maintaining weight. BM is normal. Continues to exercise 1-2 times daily. Continues to endorse moderate fatigue and mild hot flash with occ sleep interruption.   10/14/24 reports mod fatigue, fluctuating hot flash and sweating  11/5/24 - continues on abiraterone 750mg daily. Overall feeling fatigued, depressed, gaining weight. Uncle passed away, another family member in the ICU. Having episodes of crying and feeling depressed, no SI. Ongoing hot flashes. Urine flow is "fine", no urgency, nocturia 3-5x/night. Occasional BLE edema. Had the spacer placed for RT, more discomfort. Denies fever, chills, night sweats, headache, dizziness, chest pain, palpitations, SOB, cough, nausea/vomiting, diarrhea/constipation, abdominal pain, dysuria, hematuria, incontinence, bleeding, muscle or joint pain/weakness.

## 2024-11-07 NOTE — PHYSICAL EXAM
[Restricted in physically strenuous activity but ambulatory and able to carry out work of a light or sedentary nature] : Status 1- Restricted in physically strenuous activity but ambulatory and able to carry out work of a light or sedentary nature, e.g., light house work, office work [Normal] : affect appropriate [de-identified] : anicteric  [de-identified] : no LE edema

## 2024-11-07 NOTE — PHYSICAL EXAM
[Restricted in physically strenuous activity but ambulatory and able to carry out work of a light or sedentary nature] : Status 1- Restricted in physically strenuous activity but ambulatory and able to carry out work of a light or sedentary nature, e.g., light house work, office work [Normal] : affect appropriate [de-identified] : anicteric  [de-identified] : no LE edema

## 2024-11-07 NOTE — HISTORY OF PRESENT ILLNESS
[Disease: _____________________] : Disease: [unfilled] [T: ___] : T[unfilled] [N: ___] : N[unfilled] [AJCC Stage: ____] : AJCC Stage: [unfilled] [de-identified] : 59 year old male who is presenting for initial medical oncology evaluation for prostate adenocarcinoma. He noted a significant rise in his PSA to 6.23 on 2/6/24 (previous 3.71 on 1/20/23 and 1.11 on 4/1/16). MRI 4/12/24 showed 4.4 x 2.9 x 4.1 cm = 27 cc gland, with dominant lesions in the left posterior medial-lateral (PZpm-pl) base-to-midgland peripheral zone (1.6 cm, MT-5), with concern for capsular involvement but no clear EPE, abutting the left NVB and SV without clear invasion, and left posterior medial (PZpm) zwpepebt-un-xtpp peripheral zone lesion extending to the midline posterior urethra at the apex (0.9 cm, MT-4). No LAD. No major anatomic issues. Prostate biopsy 4/23/24 showed right posterior lateral base G 3+4 with 50% involvement, MRI target#1 left PZ G 4+3 among 3 cores with 60%, 55% and 55% involvement with intraductal component, MRI #2 target left PZ G 3+4 among 2 cores with 85% and 55% involvement with intraductal and cribriform component. (small prostate so limited number of sites per Dr. Umaña). PSMA PET 5/14/24 showed increased radiotracer activity in the left side of the prostate gland suggests intermediate PSA expression, with compatible metastatic disease indicated by 0.6x0.3cm left para-aortic/common iliac node SUV 7.0, a 5mm left common iliac node SUV 5.8, a 0.6x0.6cm left external iliac node SUV 10.1, PSA 7.96 and Testosterone on 272 5/20/24. He was started on Orgovyx by urology on 5/21/24   Interval History: Overall he has been doing well. He denies any issues with urination. No constipation or diarrhea. He reports that he is great. He remains active and goes to the gym 6 days per week, as well as, golf a couple days.    PMH/PSH: Appendectomy, hernia repair, testicular torsion repair, vasectomy, cervical spinal fusion; HTN (Edarbi, Amlodopine) /HLD (atorvastin)   Social: Never smoker, occasional alcohol use. 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 Has six children (age 31- 15 years)  Family History: Maternal Uncle with hx of bladder cancer    6/12/24: Patient is more fatigued than normal, says he that he normally goes to bed pretty early and is finding he wants to go to bed even earlier. He does note that since stopping  orgovyx and starting lupron and stephanie/pred the fatigue has actually improved. He was exhausted when taking orgovyx. He is having hot flashes throughout the day, says they are tolerable. Feels his metabolism may have slowed a bit, however has also been going to a lot of events (graduation parties etc) at which he is eating outside of his normal diet. He reports nocturia x 4, increased from 2-3. Endorses decreased libido, does find he is able to have erection with cialis. Has h/o cervical stenosis/fusion and had C2-C6 nerve block yesterday.   [de-identified] : G7(4+3) disease in intraductal component, 3/8 sampled sites  [de-identified] : 7/3/24: LFTs 6/28 gr II elevation, abiraterone held on 7/1, continued on prednisone. Overall patient has been feeling well, he has been motivated to exercise more due to the fact that he is on ADT and wants to counteract increased calorie intake / easier weight gain. He is doing his best to push through fatigue at certain times, but also takes a naps. He continues to follow with pain management and is planned for cervical nerve ablation. Ongoing vasomotor symptoms, says that a few nights ago he was up every hour with hot flashes, this is not the norm, discussed potential gabapentin, however patient has been on this medication previously and does not wish to start right now. Notes some urinary urgency and nocturia 2-4. Endorse little to no libido, does not think he has gotten spontaneous erections.    7/24/24 reports moderate fatigue, hot flash and sweating 10 times, loss of energy. Has normal urination.   8/16/24 pt is in general well-being. Appetite is good. maintaining weight. Exercise 1-2 times a day [work-out]. Continues to endorse moderate fatigue and drinks coffee which helps keep him active and about for about 1 hour. Hot flashes is a bit less but more often. No swelling or joint pain which he follows locally for management.  9/6/24 pt is doing well. Appetite is good and maintaining weight. BM is normal. Continues to exercise 1-2 times daily. Continues to endorse moderate fatigue and mild hot flash with occ sleep interruption.   10/14/24 reports mod fatigue, fluctuating hot flash and sweating  11/5/24 - continues on abiraterone 750mg daily. Overall feeling fatigued, depressed, gaining weight. Uncle passed away, another family member in the ICU. Having episodes of crying and feeling depressed, no SI. Ongoing hot flashes. Urine flow is "fine", no urgency, nocturia 3-5x/night. Occasional BLE edema. Had the spacer placed for RT, more discomfort. Denies fever, chills, night sweats, headache, dizziness, chest pain, palpitations, SOB, cough, nausea/vomiting, diarrhea/constipation, abdominal pain, dysuria, hematuria, incontinence, bleeding, muscle or joint pain/weakness.

## 2024-11-07 NOTE — PHYSICAL EXAM
[Restricted in physically strenuous activity but ambulatory and able to carry out work of a light or sedentary nature] : Status 1- Restricted in physically strenuous activity but ambulatory and able to carry out work of a light or sedentary nature, e.g., light house work, office work [Normal] : affect appropriate [de-identified] : anicteric  [de-identified] : no LE edema

## 2024-11-07 NOTE — HISTORY OF PRESENT ILLNESS
[Disease: _____________________] : Disease: [unfilled] [T: ___] : T[unfilled] [N: ___] : N[unfilled] [AJCC Stage: ____] : AJCC Stage: [unfilled] [de-identified] : 59 year old male who is presenting for initial medical oncology evaluation for prostate adenocarcinoma. He noted a significant rise in his PSA to 6.23 on 2/6/24 (previous 3.71 on 1/20/23 and 1.11 on 4/1/16). MRI 4/12/24 showed 4.4 x 2.9 x 4.1 cm = 27 cc gland, with dominant lesions in the left posterior medial-lateral (PZpm-pl) base-to-midgland peripheral zone (1.6 cm, MA-5), with concern for capsular involvement but no clear EPE, abutting the left NVB and SV without clear invasion, and left posterior medial (PZpm) rzjvzyoe-im-rsss peripheral zone lesion extending to the midline posterior urethra at the apex (0.9 cm, MA-4). No LAD. No major anatomic issues. Prostate biopsy 4/23/24 showed right posterior lateral base G 3+4 with 50% involvement, MRI target#1 left PZ G 4+3 among 3 cores with 60%, 55% and 55% involvement with intraductal component, MRI #2 target left PZ G 3+4 among 2 cores with 85% and 55% involvement with intraductal and cribriform component. (small prostate so limited number of sites per Dr. Umaña). PSMA PET 5/14/24 showed increased radiotracer activity in the left side of the prostate gland suggests intermediate PSA expression, with compatible metastatic disease indicated by 0.6x0.3cm left para-aortic/common iliac node SUV 7.0, a 5mm left common iliac node SUV 5.8, a 0.6x0.6cm left external iliac node SUV 10.1, PSA 7.96 and Testosterone on 272 5/20/24. He was started on Orgovyx by urology on 5/21/24   Interval History: Overall he has been doing well. He denies any issues with urination. No constipation or diarrhea. He reports that he is great. He remains active and goes to the gym 6 days per week, as well as, golf a couple days.    PMH/PSH: Appendectomy, hernia repair, testicular torsion repair, vasectomy, cervical spinal fusion; HTN (Edarbi, Amlodopine) /HLD (atorvastin)   Social: Never smoker, occasional alcohol use. 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 Has six children (age 31- 15 years)  Family History: Maternal Uncle with hx of bladder cancer    6/12/24: Patient is more fatigued than normal, says he that he normally goes to bed pretty early and is finding he wants to go to bed even earlier. He does note that since stopping  orgovyx and starting lupron and stephanie/pred the fatigue has actually improved. He was exhausted when taking orgovyx. He is having hot flashes throughout the day, says they are tolerable. Feels his metabolism may have slowed a bit, however has also been going to a lot of events (graduation parties etc) at which he is eating outside of his normal diet. He reports nocturia x 4, increased from 2-3. Endorses decreased libido, does find he is able to have erection with cialis. Has h/o cervical stenosis/fusion and had C2-C6 nerve block yesterday.   [de-identified] : G7(4+3) disease in intraductal component, 3/8 sampled sites  [de-identified] : 7/3/24: LFTs 6/28 gr II elevation, abiraterone held on 7/1, continued on prednisone. Overall patient has been feeling well, he has been motivated to exercise more due to the fact that he is on ADT and wants to counteract increased calorie intake / easier weight gain. He is doing his best to push through fatigue at certain times, but also takes a naps. He continues to follow with pain management and is planned for cervical nerve ablation. Ongoing vasomotor symptoms, says that a few nights ago he was up every hour with hot flashes, this is not the norm, discussed potential gabapentin, however patient has been on this medication previously and does not wish to start right now. Notes some urinary urgency and nocturia 2-4. Endorse little to no libido, does not think he has gotten spontaneous erections.    7/24/24 reports moderate fatigue, hot flash and sweating 10 times, loss of energy. Has normal urination.   8/16/24 pt is in general well-being. Appetite is good. maintaining weight. Exercise 1-2 times a day [work-out]. Continues to endorse moderate fatigue and drinks coffee which helps keep him active and about for about 1 hour. Hot flashes is a bit less but more often. No swelling or joint pain which he follows locally for management.  9/6/24 pt is doing well. Appetite is good and maintaining weight. BM is normal. Continues to exercise 1-2 times daily. Continues to endorse moderate fatigue and mild hot flash with occ sleep interruption.   10/14/24 reports mod fatigue, fluctuating hot flash and sweating  11/5/24 - continues on abiraterone 750mg daily. Overall feeling fatigued, depressed, gaining weight. Uncle passed away, another family member in the ICU. Having episodes of crying and feeling depressed, no SI. Ongoing hot flashes. Urine flow is "fine", no urgency, nocturia 3-5x/night. Occasional BLE edema. Had the spacer placed for RT, more discomfort. Denies fever, chills, night sweats, headache, dizziness, chest pain, palpitations, SOB, cough, nausea/vomiting, diarrhea/constipation, abdominal pain, dysuria, hematuria, incontinence, bleeding, muscle or joint pain/weakness.

## 2024-11-07 NOTE — REVIEW OF SYSTEMS
Detail Level: Detailed [Fatigue] : fatigue [Lower Ext Edema] : lower extremity edema [Diarrhea: Grade 0] : Diarrhea: Grade 0 [Hot Flashes] : hot flashes [Recent Change In Weight] : ~T recent weight change [Depression] : depression [Fever] : no fever [Chills] : no chills [Chest Pain] : no chest pain [Palpitations] : no palpitations [Shortness Of Breath] : no shortness of breath [Cough] : no cough [Abdominal Pain] : no abdominal pain [Vomiting] : no vomiting [Constipation] : no constipation [Dysuria] : no dysuria [Incontinence] : no incontinence [Joint Pain] : no joint pain [Joint Stiffness] : no joint stiffness [Muscle Pain] : no muscle pain [Muscle Weakness] : no muscle weakness [Dizziness] : no dizziness [Suicidal] : not suicidal [Easy Bleeding] : no tendency for easy bleeding [Easy Bruising] : no tendency for easy bruising

## 2024-11-18 NOTE — DISEASE MANAGEMENT
[0-10] : 0 -10 ng/mL [Biopsy with Fusion] : Patient had a biopsy with fusion on [7(3+4)] : Template Biopsy [place] Score: 7(3+4) [7(4+3)] : Fusion Biopsy [place] Score: 7(4+3) [] : Patient had a Prostate MRI [5] : 5 [FARIDA] : FARIDA [Treatment with androgen ablation] : Treatment with androgen ablation [BiopsyDate] : 04/23/2024 [MeasuredProstateVolume] : 27 [TotalCores] : 11 [TotalPositiveCores] : 6 [MaxCoreInvolvement] : 85 [Pathological] : TNM Stage: p [TTNM] : 3a [NTNM] : 1 [MTNM] : 0 [de-identified] : 500cGy [de-identified] : 7000cGy [de-identified] : Prostate/SV/Nodes  [ADTStartedDate] : 6/2024

## 2024-11-18 NOTE — HISTORY OF PRESENT ILLNESS
[FreeTextEntry1] : Mr. Maynard is a 60-year-old male who presents for an on-treatment visit.   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 [place] score 4+3=7 in 1 site/3 cores, Carcinoma is seen involving adipose tissue, consistent with extraprostatic extension. Intraductal carcinoma of the prostate also present. Perineural invasion present. [place]jel[place] score 3+4=7 in 2 sites/3 cores, Atypical intraductal proliferation (AIP) and Intraductal carcinoma of the prostate also present. 85% max involvement. Cribriform Anjelica pattern 4 is present.  PSA 6.23 ng/mL. PSMA PET with few subcentimeter retroperitoneal and left pelvic lymph nodes with increased radiotracer activity are compatible with metastatic disease.  TREATMENT: 5/21/24 - Received Orgovyx x 2 weeks  6/5/24 - started on Lupron/Eligard injections  6/2024 - started on Abiraterone/Prednisone (dose reduced due to elevated LFTs) 11/16/24 - started on RADIATION Therapy to Prostate/SV/Nodes, 7000 cGy in 28 fx   PSA Trend: 1/20/23 - 3.71 ng/mL 2/6/24 - 6.23 5/20/24 - 7.96           Testosterone 272.0 ng/dL  6/30/24 - 1.67 7/17/24 - 1.15 8/9/24 - 0.64 8/31/24 - 0.39           Testosterone < 2.5 9/19/24 - 0.32 10/12/24 - 0.22 11/4/24 - 0.19 11/11/24 - 0.14   HPI:  Mr. Maynard originally presented to urology in March 2024 for evaluation of elevated PSA of 6.23 ng/mL. MRI ordered.   4/12/24 - Prostate MRI: Prostate volume 27 cc. PSA density: 0.23 ng/mL/mL. Lesion #1, Left, posterior medial-lateral (PZpm-pl), base-to-midgland, peripheral zone (16 x 9 x 15 mm). Tumor abuts capsule causing capsular irregularity. PIRADS 5 Lesion #2, Left, posterior medial (PZpm), [id number], peripheral zone lesion extends to the midline posterior urethra at the apex (7 x 3 x 9 mm). No extraprostatic extension noted. PIRADS 4 Lesion 1 abuts the left neurovascular bundle. Lesion 1 abuts the left seminal vesicle without definite invasion. No pelvic lymphadenopathy and no suspicious bone lesions identified.    4/23/24 - underwent Prostate Biopsy: Pathology - Adenocarcinoma of Prostate, 3/8 sites and 6/11 cores involved.  [place] score 4+3=7 in 1 site/3 cores (MRI target lesion #1), Carcinoma is seen involving adipose tissue, consistent with extraprostatic extension. Intraductal carcinoma of the prostate also present. Perineural invasion present.  [place] score 3+4=7 in 2 sites/3 cores (MRI target lesion and right posterior lateral base), Atypical intraductal proliferation (AIP) and Intraductal carcinoma of the prostate also present. 85% max involvement.  Note: Cribriform [place] pattern 4 is present.    Mr. Maynard saw Dr. Umaña (urology) in follow up and biopsy results were reviewed.  Reviewed that definitive treatment would be recommended given the biopsy results.  PSMA PET ordered. Referrals to urology surgeon and radiation oncology made.  He then was seen by Dr. Pollock (urology) and Dr. Diamond ([facility]) in multidisciplinary clinic on 5/9/24, both treatment options were reviewed in detail.  Final decision about treatment to be made after PSMA PET.   5/14/24 - PSMA PET: 1. Foci of increased radiotracer activity in left side of prostate gland, extending from apex to base, corresponding to lesions identified on MRI/biopsy-proven prostate carcinoma. The intensity of radiotracer activity suggests intermediate PSMA expression. 2. Few subcentimeter retroperitoneal and left pelvic lymph nodes with increased radiotracer activity are compatible with metastatic disease.  5/22/24 - saw Dr. Rice ([facility]) in consultation. Discussed all imaging and pathology results.  He has newly diagnosed at least stage FARIDA prostate adenocarcinoma (up to [place] 7 (4+3). PSMA PET 5/14/24 showed several SUV positive sub-centimeter pelvic lymph nodes. This indicates very high-risk feature for his newly diagnosed prostate cancer. The plan is to proceed with radiation and anti- hormone therapy. The recommended treatment approach in this setting (node positive disease) based on the STAMPEDE trial is adding 2-year abiraterone (+prednisone) to ADT plus RT as an option for patients with very high-risk prostate cancer.    6/2024 - started on Lupron/Eligard injections and Abiraterone/Prednisone with Dr. Rice ([facility])  7/1/24 - saw Dr. Nielsen ([facility]) in consultation as Dr. Diamond had left the practice.  Recommended external beam radiation therapy to the prostate, in addition to continuing hormone therapy, based on the STAMPEDE study findings. The radiation therapy would be given for 5 weeks (25 treatments) after observing the PSA levels on hormone therapy (typically 4-6 months). Will coordinate with the radiation oncology team at the [facility] in [place] for the patient's radiation treatment.    10/29/24 - Had Prostate Fiducial Markers/SpaceOAR Gel placed by Dr. Nielsen ([facility]) in preparation for radiation therapy.    11/5/24 - presented for follow up visit prior to SIM planning scan as he is to receive his radiation at [facility].  Mr. Maynard is feeling well today, appropriately anxious about next steps. He has nocturia of 4x (wakes due to hot flashes so urinates as well), frequency, and occasional urgency. No bowel issues, last colonoscopy was within the past year.  Poor erectile function has seen Dr. Ren. He continues to follow with Dr. Rice ([facility]) and is on Lupron injections and Abiraterone/Prednisone, as per patient told to hold Abiraterone due to elevated LFTs.  He is having hot flashes, decreased energy, and some depression since starting on Lupron injections and Abiraterone/Prednisone. He is looking forward to a trip to [place] at the end of December.  Met with wife and patient to discuss radiation option. Reviewed imaging. Pelvic lymph nodes could be covered by pelvic RT field, including all PSMA positive disease. PSA down to 0.19ng/mL. We discussed options for STAMPEDE RT vs RT to pelvis and prostate. Logisitics and possible acute and late side effects discussed. Opted for comprehensive RT to pelvis and prostate 28 fractions. Consent signed. Simulation today.  11/16/24 - started on RADIATION Therapy to Prostate/SV/Nodes, 7000 cGy in 28 fx with ADT and Abiraterone/Prednisone   11/18/24 - OTV to complete 2/28 fx today.  Reinforced what to expect with radiation therapy, possible side effects, as well the importance of bladder filling/bowel emptying. Diet reviewed.

## 2024-11-18 NOTE — REVIEW OF SYSTEMS
[Constipation: Grade 0] : Constipation: Grade 0 [Diarrhea: Grade 0] : Diarrhea: Grade 0 [Proctitis: Grade 0] : Proctitis: Grade 0 [Rectal Pain: Grade 0] : Rectal Pain: Grade 0 [Fatigue: Grade 1 - Fatigue relieved by rest] : Fatigue: Grade 1 - Fatigue relieved by rest [Hematuria: Grade 0] : Hematuria: Grade 0 [Urinary Incontinence: Grade 0] : Urinary Incontinence: Grade 0  [Urinary Retention: Grade 0] : Urinary Retention: Grade 0 [Urinary Tract Pain: Grade 0] : Urinary Tract Pain: Grade 0 [Urinary Urgency: Grade 0] : Urinary Urgency: Grade 0 [Urinary Frequency: Grade 1 - Present] : Urinary Frequency: Grade 1 - Present [Erectile Dysfunction: Grade 2 - Decrease in erectile function (frequency/rigidity of erections), erectile intervention indicated, (e.g., medication or mechanical devices such as penile pump)] : Erectile Dysfunction: Grade 2 - Decrease in erectile function (frequency/rigidity of erections), erectile intervention indicated, (e.g., medication or mechanical devices such as penile pump) [Ejaculation Disorder: Grade 2 - Anejaculation or retrograde ejaculation] : Ejaculation Disorder: Grade 2 - Anejaculation or retrograde ejaculation [FreeTextEntry5] : occasional

## 2024-11-25 NOTE — DISEASE MANAGEMENT
[0-10] : 0 -10 ng/mL [Biopsy with Fusion] : Patient had a biopsy with fusion on [7(3+4)] : Template Biopsy [place] Score: 7(3+4) [7(4+3)] : Fusion Biopsy [place] Score: 7(4+3) [] : Patient had a Prostate MRI [5] : 5 [Pathological] : TNM Stage: p [FARIDA] : FARIDA [Radiation Therapy] : Radiation Therapy [Androgen Ablation] : Androgen Ablation [Treatment with androgen ablation] : Treatment with androgen ablation [BiopsyDate] : 04/23/2024 [MeasuredProstateVolume] : 27 [TotalCores] : 11 [TotalPositiveCores] : 6 [MaxCoreInvolvement] : 85 [TTNM] : 3a [NTNM] : 1 [MTNM] : 0 [de-identified] : 2000cGy [de-identified] : 7000cGy [de-identified] : Prostate/SV/Nodes with ADT and Abiraterone/Prednisone  [ADTStartedDate] : 6/2024

## 2024-11-25 NOTE — DISEASE MANAGEMENT
[0-10] : 0 -10 ng/mL [Biopsy with Fusion] : Patient had a biopsy with fusion on [7(3+4)] : Template Biopsy [place] Score: 7(3+4) [7(4+3)] : Fusion Biopsy [place] Score: 7(4+3) [] : Patient had a Prostate MRI [5] : 5 [Pathological] : TNM Stage: p [FARIDA] : FARIDA [Radiation Therapy] : Radiation Therapy [Androgen Ablation] : Androgen Ablation [Treatment with androgen ablation] : Treatment with androgen ablation [BiopsyDate] : 04/23/2024 [MeasuredProstateVolume] : 27 [TotalCores] : 11 [TotalPositiveCores] : 6 [MaxCoreInvolvement] : 85 [TTNM] : 3a [NTNM] : 1 [MTNM] : 0 [de-identified] : 2000cGy [de-identified] : 7000cGy [de-identified] : Prostate/SV/Nodes with ADT and Abiraterone/Prednisone  [ADTStartedDate] : 6/2024

## 2024-11-25 NOTE — HISTORY OF PRESENT ILLNESS
[FreeTextEntry1] : Mr. Maynard is a 60-year-old male who presents for an on-treatment visit.   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 [place] score 4+3=7 in 1 site/3 cores, Carcinoma is seen involving adipose tissue, consistent with extraprostatic extension. Intraductal carcinoma of the prostate also present. Perineural invasion present. Anjelica score 3+4=7 in 2 sites/3 cores, Atypical intraductal proliferation (AIP) and Intraductal carcinoma of the prostate also present. 85% max involvement. Cribriform Anjelica pattern 4 is present.  PSA 6.23 ng/mL. PSMA PET with few subcentimeter retroperitoneal and left pelvic lymph nodes with increased radiotracer activity are compatible with metastatic disease.  TREATMENT: 5/21/24 - Received Orgovyx x 2 weeks  6/5/24 - started on Lupron/Eligard injections  6/2024 - started on Abiraterone/Prednisone (dose reduced due to elevated LFTs) 11/16/24 - started on RADIATION Therapy to Prostate/SV/Nodes, 7000 cGy in 28 fx   PSA Trend: 1/20/23 - 3.71 ng/mL 2/6/24 - 6.23 5/20/24 - 7.96           Testosterone 272.0 ng/dL  6/30/24 - 1.67 7/17/24 - 1.15 8/9/24 - 0.64 8/31/24 - 0.39           Testosterone < 2.5 9/19/24 - 0.32 10/12/24 - 0.22 11/4/24 - 0.19 11/11/24 - 0.14  11/18/24 - 0.21 *started radiation 11/16/24*  HPI:  Mr. Maynard originally presented to urology in March 2024 for evaluation of elevated PSA of 6.23 ng/mL. MRI ordered.   4/12/24 - Prostate MRI: Prostate volume 27 cc. PSA density: 0.23 ng/mL/mL. Lesion #1, Left, posterior medial-lateral (PZpm-pl), base-to-midgland, peripheral zone (16 x 9 x 15 mm). Tumor abuts capsule causing capsular irregularity. PIRADS 5 Lesion #2, Left, posterior medial (PZpm), [id number], peripheral zone lesion extends to the midline posterior urethra at the apex (7 x 3 x 9 mm). No extraprostatic extension noted. PIRADS 4 Lesion 1 abuts the left neurovascular bundle. Lesion 1 abuts the left seminal vesicle without definite invasion. No pelvic lymphadenopathy and no suspicious bone lesions identified.    4/23/24 - underwent Prostate Biopsy: Pathology - Adenocarcinoma of Prostate, 3/8 sites and 6/11 cores involved.  Anjelica score 4+3=7 in 1 site/3 cores (MRI target lesion #1), Carcinoma is seen involving adipose tissue, consistent with extraprostatic extension. Intraductal carcinoma of the prostate also present. Perineural invasion present.  [place] score 3+4=7 in 2 sites/3 cores (MRI target lesion and right posterior lateral base), Atypical intraductal proliferation (AIP) and Intraductal carcinoma of the prostate also present. 85% max involvement.  Note: Cribriform [place] pattern 4 is present.    Mr. Maynard saw Dr. Umaña (urology) in follow up and biopsy results were reviewed.  Reviewed that definitive treatment would be recommended given the biopsy results.  PSMA PET ordered. Referrals to urology surgeon and radiation oncology made.  He then was seen by Dr. Pollock (urology) and Dr. Diamond ([facility]) in multidisciplinary clinic on 5/9/24, both treatment options were reviewed in detail.  Final decision about treatment to be made after PSMA PET.   5/14/24 - PSMA PET: 1. Foci of increased radiotracer activity in left side of prostate gland, extending from apex to base, corresponding to lesions identified on MRI/biopsy-proven prostate carcinoma. The intensity of radiotracer activity suggests intermediate PSMA expression. 2. Few subcentimeter retroperitoneal and left pelvic lymph nodes with increased radiotracer activity are compatible with metastatic disease.  5/22/24 - saw Dr. Rice ([facility]) in consultation. Discussed all imaging and pathology results.  He has newly diagnosed at least stage FARIDA prostate adenocarcinoma (up to Anjelica 7 (4+3). PSMA PET 5/14/24 showed several SUV positive sub-centimeter pelvic lymph nodes. This indicates very high-risk feature for his newly diagnosed prostate cancer. The plan is to proceed with radiation and anti- hormone therapy. The recommended treatment approach in this setting (node positive disease) based on the STAMPEDE trial is adding 2-year abiraterone (+prednisone) to ADT plus RT as an option for patients with very high-risk prostate cancer.    6/2024 - started on Lupron/Eligard injections and Abiraterone/Prednisone with Dr. Rice ([facility])  7/1/24 - saw Dr. Nielsen ([facility]) in consultation as Dr. Diamond had left the practice.  Recommended external beam radiation therapy to the prostate, in addition to continuing hormone therapy, based on the STAMPEDE study findings. The radiation therapy would be given for 5 weeks (25 treatments) after observing the PSA levels on hormone therapy (typically 4-6 months). Will coordinate with the radiation oncology team at the [facility] in [place] for the patient's radiation treatment.    10/29/24 - Had Prostate Fiducial Markers/SpaceOAR Gel placed by Dr. Nielsen ([facility]) in preparation for radiation therapy.    11/5/24 - presented for follow up visit prior to SIM planning scan as he is to receive his radiation at [facility].  Mr. Maynard is feeling well today, appropriately anxious about next steps. He has nocturia of 4x (wakes due to hot flashes so urinates as well), frequency, and occasional urgency. No bowel issues, last colonoscopy was within the past year.  Poor erectile function has seen Dr. Ren. He continues to follow with Dr. Rice ([facility]) and is on Lupron injections and Abiraterone/Prednisone, as per patient told to hold Abiraterone due to elevated LFTs.  He is having hot flashes, decreased energy, and some depression since starting on Lupron injections and Abiraterone/Prednisone. He is looking forward to a trip to [place] at the end of December.  Met with wife and patient to discuss radiation option. Reviewed imaging. Pelvic lymph nodes could be covered by pelvic RT field, including all PSMA positive disease. PSA down to 0.19ng/mL. We discussed options for STAMPEDE RT vs RT to pelvis and prostate. Logisitics and possible acute and late side effects discussed. Opted for comprehensive RT to pelvis and prostate 28 fractions. Consent signed. Simulation today.  11/16/24 - started on RADIATION Therapy to Prostate/SV/Nodes, 7000 cGy in 28 fx with ADT and Abiraterone/Prednisone   11/18/24 - OTV to complete 2/28 fx today.  Reinforced what to expect with radiation therapy, possible side effects, as well the importance of bladder filling/bowel emptying. Diet reviewed.    11/25/24 - OTV 8/28 fx completed.

## 2024-11-25 NOTE — HISTORY OF PRESENT ILLNESS
[FreeTextEntry1] : Mr. Maynard is a 60-year-old male who presents for an on-treatment visit.   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 [place] score 4+3=7 in 1 site/3 cores, Carcinoma is seen involving adipose tissue, consistent with extraprostatic extension. Intraductal carcinoma of the prostate also present. Perineural invasion present. Anjelica score 3+4=7 in 2 sites/3 cores, Atypical intraductal proliferation (AIP) and Intraductal carcinoma of the prostate also present. 85% max involvement. Cribriform Anjelica pattern 4 is present.  PSA 6.23 ng/mL. PSMA PET with few subcentimeter retroperitoneal and left pelvic lymph nodes with increased radiotracer activity are compatible with metastatic disease.  TREATMENT: 5/21/24 - Received Orgovyx x 2 weeks  6/5/24 - started on Lupron/Eligard injections  6/2024 - started on Abiraterone/Prednisone (dose reduced due to elevated LFTs) 11/16/24 - started on RADIATION Therapy to Prostate/SV/Nodes, 7000 cGy in 28 fx   PSA Trend: 1/20/23 - 3.71 ng/mL 2/6/24 - 6.23 5/20/24 - 7.96           Testosterone 272.0 ng/dL  6/30/24 - 1.67 7/17/24 - 1.15 8/9/24 - 0.64 8/31/24 - 0.39           Testosterone < 2.5 9/19/24 - 0.32 10/12/24 - 0.22 11/4/24 - 0.19 11/11/24 - 0.14  11/18/24 - 0.21 *started radiation 11/16/24*  HPI:  Mr. Maynard originally presented to urology in March 2024 for evaluation of elevated PSA of 6.23 ng/mL. MRI ordered.   4/12/24 - Prostate MRI: Prostate volume 27 cc. PSA density: 0.23 ng/mL/mL. Lesion #1, Left, posterior medial-lateral (PZpm-pl), base-to-midgland, peripheral zone (16 x 9 x 15 mm). Tumor abuts capsule causing capsular irregularity. PIRADS 5 Lesion #2, Left, posterior medial (PZpm), [id number], peripheral zone lesion extends to the midline posterior urethra at the apex (7 x 3 x 9 mm). No extraprostatic extension noted. PIRADS 4 Lesion 1 abuts the left neurovascular bundle. Lesion 1 abuts the left seminal vesicle without definite invasion. No pelvic lymphadenopathy and no suspicious bone lesions identified.    4/23/24 - underwent Prostate Biopsy: Pathology - Adenocarcinoma of Prostate, 3/8 sites and 6/11 cores involved.  Anjelica score 4+3=7 in 1 site/3 cores (MRI target lesion #1), Carcinoma is seen involving adipose tissue, consistent with extraprostatic extension. Intraductal carcinoma of the prostate also present. Perineural invasion present.  [place] score 3+4=7 in 2 sites/3 cores (MRI target lesion and right posterior lateral base), Atypical intraductal proliferation (AIP) and Intraductal carcinoma of the prostate also present. 85% max involvement.  Note: Cribriform Anjelica pattern 4 is present.    Mr. Maynard saw Dr. Umaña (urology) in follow up and biopsy results were reviewed.  Reviewed that definitive treatment would be recommended given the biopsy results.  PSMA PET ordered. Referrals to urology surgeon and radiation oncology made.  He then was seen by Dr. Pollock (urology) and Dr. Diamond ([facility]) in multidisciplinary clinic on 5/9/24, both treatment options were reviewed in detail.  Final decision about treatment to be made after PSMA PET.   5/14/24 - PSMA PET: 1. Foci of increased radiotracer activity in left side of prostate gland, extending from apex to base, corresponding to lesions identified on MRI/biopsy-proven prostate carcinoma. The intensity of radiotracer activity suggests intermediate PSMA expression. 2. Few subcentimeter retroperitoneal and left pelvic lymph nodes with increased radiotracer activity are compatible with metastatic disease.  5/22/24 - saw Dr. Rice ([facility]) in consultation. Discussed all imaging and pathology results.  He has newly diagnosed at least stage FARIDA prostate adenocarcinoma (up to Anjelica 7 (4+3). PSMA PET 5/14/24 showed several SUV positive sub-centimeter pelvic lymph nodes. This indicates very high-risk feature for his newly diagnosed prostate cancer. The plan is to proceed with radiation and anti- hormone therapy. The recommended treatment approach in this setting (node positive disease) based on the STAMPEDE trial is adding 2-year abiraterone (+prednisone) to ADT plus RT as an option for patients with very high-risk prostate cancer.    6/2024 - started on Lupron/Eligard injections and Abiraterone/Prednisone with Dr. Rice ([facility])  7/1/24 - saw Dr. Nielsen ([facility]) in consultation as Dr. Diamond had left the practice.  Recommended external beam radiation therapy to the prostate, in addition to continuing hormone therapy, based on the STAMPEDE study findings. The radiation therapy would be given for 5 weeks (25 treatments) after observing the PSA levels on hormone therapy (typically 4-6 months). Will coordinate with the radiation oncology team at the [facility] in [place] for the patient's radiation treatment.    10/29/24 - Had Prostate Fiducial Markers/SpaceOAR Gel placed by Dr. Nielsen ([facility]) in preparation for radiation therapy.    11/5/24 - presented for follow up visit prior to SIM planning scan as he is to receive his radiation at [facility].  Mr. Maynard is feeling well today, appropriately anxious about next steps. He has nocturia of 4x (wakes due to hot flashes so urinates as well), frequency, and occasional urgency. No bowel issues, last colonoscopy was within the past year.  Poor erectile function has seen Dr. Ren. He continues to follow with Dr. Rice ([facility]) and is on Lupron injections and Abiraterone/Prednisone, as per patient told to hold Abiraterone due to elevated LFTs.  He is having hot flashes, decreased energy, and some depression since starting on Lupron injections and Abiraterone/Prednisone. He is looking forward to a trip to [place] at the end of December.  Met with wife and patient to discuss radiation option. Reviewed imaging. Pelvic lymph nodes could be covered by pelvic RT field, including all PSMA positive disease. PSA down to 0.19ng/mL. We discussed options for STAMPEDE RT vs RT to pelvis and prostate. Logisitics and possible acute and late side effects discussed. Opted for comprehensive RT to pelvis and prostate 28 fractions. Consent signed. Simulation today.  11/16/24 - started on RADIATION Therapy to Prostate/SV/Nodes, 7000 cGy in 28 fx with ADT and Abiraterone/Prednisone   11/18/24 - OTV to complete 2/28 fx today.  Reinforced what to expect with radiation therapy, possible side effects, as well the importance of bladder filling/bowel emptying. Diet reviewed.    11/25/24 - OTV 8/28 fx completed.

## 2024-11-26 NOTE — PHYSICAL EXAM
[Restricted in physically strenuous activity but ambulatory and able to carry out work of a light or sedentary nature] : Status 1- Restricted in physically strenuous activity but ambulatory and able to carry out work of a light or sedentary nature, e.g., light house work, office work [Normal] : affect appropriate [de-identified] : anicteric

## 2024-11-26 NOTE — ASSESSMENT
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 He has at least stage FARIDA prostate adenocarcinoma (up to [place] 7 (4+3). PSMA PET 5/14/24 showed several SUV positive sub-centimeter pelvic lymph nodes. This indicates very high risk feature for his newly diagnosed prostate cancer. The plan is to proceed with radiation and anti- hormone therapy. The recommended treatment approach in this setting (node positive disease) based on the STAMPEDE trial is adding 2 year abiraterone (+prednisone) to ADT plus RT as an option for patients with very high risk prostate cancer. Patient had been started on Orgovyx on 5/20/24, he continued for two weeks and then received first dose of Lupron on 6/5/24. He started abiraterone and prednisone in June 2024.  Recommend genetic testing given he has node positive disease. Based on his FRAX score 10 years of probability of hip fracture 0.5%, a 10-year probability of a major osteoporosis-related fracture 6%. He does not require DEXA scan and bisphosphate vs prolia.   Plan: Very high risk of prostate cancer with pelvic nodes - Initial PSA was 7.96 on 5/20/24, declined to 0.14 on 11/11/24. PSA was 0.24 on 11/23/24, overall stable but possibly slightly more elevated in the setting of radiation and abscess as below.  - Continue Lupron [id number], last given 9/6/24, next scheduled for 12/4/24.  - Per d/w Dr. Rice, given ALT >20x ULN, will permanently discontinue abiraterone. He will continue on ADT alone at this time.  - RT to the prostate and pelvic LNs x 28 fx started 11/15/24, on hold d/t perirectal abscess as below.   Katherine rectal abscess: - Significant perineal pain. Augmentin started yesterday. MRI of the pelvis done today 11/26 shows a collection measuring 3.4 x 3.1 x 5.0 cm between the rectum and prostate consistent with an abscess (infection associated with the spacer gel). - Pt has subjective fevers (taking Motrin around the clock) and chills. Dr. Angel was present for a portion of this visit, pt referred to [facility] ED for abscess drainage and IV abx. I have emailed the inpatient oncology team and [facility] ED made aware via Teams.   Transaminitis -  Reviewed 8/31/24 LFT with pt/spouse noting AST-59, ALT - 94, T.B - 1.4; will continue to monitor < GR 1 - 11/4/24 AST = 467, ALT = 1,0006 - grade 4 - 11/23/24 AST = 28, ALT = 56 - Abiraterone discontinued permanently.  Instructed to contact our office with any new/worsening symptoms. Pt and wife educated regarding plan of care, all questions/concerns addressed to the best of my abilities and their apparent satisfaction. F/u in 3wks  [Future Reassessment of Pain Scale] : Future reassessment of pain scale    [Medication(s)] : Medication(s)

## 2024-11-26 NOTE — ASSESSMENT
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 He has at least stage FARIDA prostate adenocarcinoma (up to [place] 7 (4+3). PSMA PET 5/14/24 showed several SUV positive sub-centimeter pelvic lymph nodes. This indicates very high risk feature for his newly diagnosed prostate cancer. The plan is to proceed with radiation and anti- hormone therapy. The recommended treatment approach in this setting (node positive disease) based on the STAMPEDE trial is adding 2 year abiraterone (+prednisone) to ADT plus RT as an option for patients with very high risk prostate cancer. Patient had been started on Orgovyx on 5/20/24, he continued for two weeks and then received first dose of Lupron on 6/5/24. He started abiraterone and prednisone in June 2024.  Recommend genetic testing given he has node positive disease. Based on his FRAX score 10 years of probability of hip fracture 0.5%, a 10-year probability of a major osteoporosis-related fracture 6%. He does not require DEXA scan and bisphosphate vs prolia.   Plan: Very high risk of prostate cancer with pelvic nodes - Initial PSA was 7.96 on 5/20/24, declined to 0.14 on 11/11/24. PSA was 0.24 on 11/23/24, overall stable but possibly slightly more elevated in the setting of radiation and abscess as below.  - Continue Lupron [id number], last given 9/6/24, next scheduled for 12/4/24.  - Per d/w Dr. Rice, given ALT >20x ULN, will permanently discontinue abiraterone. He will continue on ADT alone at this time.  - RT to the prostate and pelvic LNs x 28 fx started 11/15/24, on hold d/t perirectal abscess as below.   Katherine rectal abscess: - Significant perineal pain. Augmentin started yesterday. MRI of the pelvis done today 11/26 shows a collection measuring 3.4 x 3.1 x 5.0 cm between the rectum and prostate consistent with an abscess (infection associated with the spacer gel). - Pt has subjective fevers (taking Motrin around the clock) and chills. Dr. Angel was present for a portion of this visit, pt referred to [facility] ED for abscess drainage and IV abx. I have emailed the inpatient oncology team and [facility] ED made aware via Teams.   Transaminitis -  Reviewed 8/31/24 LFT with pt/spouse noting AST-59, ALT - 94, T.B - 1.4; will continue to monitor < GR 1 - 11/4/24 AST = 467, ALT = 1,0006 - grade 4 - 11/23/24 AST = 28, ALT = 56 - Abiraterone discontinued permanently.  Instructed to contact our office with any new/worsening symptoms. Pt and wife educated regarding plan of care, all questions/concerns addressed to the best of my abilities and their apparent satisfaction. F/u in 3wks  [Future Reassessment of Pain Scale] : Future reassessment of pain scale    [Medication(s)] : Medication(s)

## 2024-11-26 NOTE — PHYSICAL EXAM
[Restricted in physically strenuous activity but ambulatory and able to carry out work of a light or sedentary nature] : Status 1- Restricted in physically strenuous activity but ambulatory and able to carry out work of a light or sedentary nature, e.g., light house work, office work [Normal] : affect appropriate [de-identified] : anicteric

## 2024-11-26 NOTE — HISTORY OF PRESENT ILLNESS
[Disease: _____________________] : Disease: [unfilled] [T: ___] : T[unfilled] [N: ___] : N[unfilled] [AJCC Stage: ____] : AJCC Stage: [unfilled] [de-identified] : 59 year old male who is presenting for initial medical oncology evaluation for prostate adenocarcinoma. He noted a significant rise in his PSA to 6.23 on 2/6/24 (previous 3.71 on 1/20/23 and 1.11 on 4/1/16). MRI 4/12/24 showed 4.4 x 2.9 x 4.1 cm = 27 cc gland, with dominant lesions in the left posterior medial-lateral (PZpm-pl) base-to-midgland peripheral zone (1.6 cm, GA-5), with concern for capsular involvement but no clear EPE, abutting the left NVB and SV without clear invasion, and left posterior medial (PZpm) [id number] peripheral zone lesion extending to the midline posterior urethra at the apex (0.9 cm, GA-4). No LAD. No major anatomic issues. Prostate biopsy 4/23/24 showed right posterior lateral base G 3+4 with 50% involvement, MRI target#1 left PZ G 4+3 among 3 cores with 60%, 55% and 55% involvement with intraductal component, MRI #2 target left PZ G 3+4 among 2 cores with 85% and 55% involvement with intraductal and cribriform component. (small prostate so limited number of sites per Dr. Umaña). PSMA PET 5/14/24 showed increased radiotracer activity in the left side of the prostate gland suggests intermediate PSA expression, with compatible metastatic disease indicated by 0.6x0.3cm left para-aortic/common iliac node SUV 7.0, a 5mm left common iliac node SUV 5.8, a 0.6x0.6cm left external iliac node SUV 10.1, PSA 7.96 and Testosterone on 272 5/20/24. He was started on Orgovyx by urology on 5/21/24   Interval History: Overall he has been doing well. He denies any issues with urination. No constipation or diarrhea. He reports that he is great. He remains active and goes to the gym 6 days per week, as well as, golf a couple days.    PMH/PSH: Appendectomy, hernia repair, testicular torsion repair, vasectomy, cervical spinal fusion; HTN (Edarbi, Amlodopine) /HLD (atorvastin)   Social: Never smoker, occasional alcohol use. 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 Has six children (age 31- 15 years)  Family History: Maternal Uncle with hx of bladder cancer    6/12/24: Patient is more fatigued than normal, says he that he normally goes to bed pretty early and is finding he wants to go to bed even earlier. He does note that since stopping  orgovyx and starting lupron and stephanie/pred the fatigue has actually improved. He was exhausted when taking orgovyx. He is having hot flashes throughout the day, says they are tolerable. Feels his metabolism may have slowed a bit, however has also been going to a lot of events (graduation parties etc) at which he is eating outside of his normal diet. He reports nocturia x 4, increased from 2-3. Endorses decreased libido, does find he is able to have erection with cialis. Has h/o cervical stenosis/fusion and had C2-C6 nerve block yesterday.    7/3/24: LFTs 6/28 gr II elevation, abiraterone held on 7/1, continued on prednisone. Overall patient has been feeling well, he has been motivated to exercise more due to the fact that he is on ADT and wants to counteract increased calorie intake / easier weight gain. He is doing his best to push through fatigue at certain times, but also takes a naps. He continues to follow with pain management and is planned for cervical nerve ablation. Ongoing vasomotor symptoms, says that a few nights ago he was up every hour with hot flashes, this is not the norm, discussed potential gabapentin, however patient has been on this medication previously and does not wish to start right now. Notes some urinary urgency and nocturia 2-4. Endorse little to no libido, does not think he has gotten spontaneous erections.    7/24/24 reports moderate fatigue, hot flash and sweating 10 times, loss of energy. Has normal urination.   8/16/24 pt is in general well-being. Appetite is good. maintaining weight. Exercise 1-2 times a day [work-out]. Continues to endorse moderate fatigue and drinks coffee which helps keep him active and about for about 1 hour. Hot flashes is a bit less but more often. No swelling or joint pain which he follows locally for management.  9/6/24 pt is doing well. Appetite is good and maintaining weight. BM is normal. Continues to exercise 1-2 times daily. Continues to endorse moderate fatigue and mild hot flash with occ sleep interruption.   10/14/24 reports mod fatigue, fluctuating hot flash and sweating  11/5/24 - continues on abiraterone 750mg daily. Overall feeling fatigued, depressed, gaining weight. Uncle passed away, another family member in the ICU. Having episodes of crying and feeling depressed, no SI. Ongoing hot flashes. Urine flow is "fine", no urgency, nocturia 3-5x/night. Occasional BLE edema. Had the spacer placed for RT, more discomfort. Denies fever, chills, night sweats, headache, dizziness, chest pain, palpitations, SOB, cough, nausea/vomiting, diarrhea/constipation, abdominal pain, dysuria, hematuria, incontinence, bleeding, muscle or joint pain/weakness. [de-identified] : G7(4+3) disease in intraductal component, 3/8 sampled sites  [de-identified] : 11/26/24 - abiraterone discontinued early Nov 2024 d/t grade 4 transaminitis. Main issue is perineal pain since spacer gel placement. He saw Dr. Angel yesterday and started Augmentin for suspected infection. MRI done today shows an abscess between the prostate and rectum. No actual fevers but has been taking Motrin ATC with subjective fevers and shaking chills at times. Denies chest pain, palpitations, SOB, cough, nausea/vomiting, diarrhea/constipation, abdominal pain.

## 2024-11-26 NOTE — HISTORY OF PRESENT ILLNESS
[Disease: _____________________] : Disease: [unfilled] [T: ___] : T[unfilled] [N: ___] : N[unfilled] [AJCC Stage: ____] : AJCC Stage: [unfilled] [de-identified] : 59 year old male who is presenting for initial medical oncology evaluation for prostate adenocarcinoma. He noted a significant rise in his PSA to 6.23 on 2/6/24 (previous 3.71 on 1/20/23 and 1.11 on 4/1/16). MRI 4/12/24 showed 4.4 x 2.9 x 4.1 cm = 27 cc gland, with dominant lesions in the left posterior medial-lateral (PZpm-pl) base-to-midgland peripheral zone (1.6 cm, MO-5), with concern for capsular involvement but no clear EPE, abutting the left NVB and SV without clear invasion, and left posterior medial (PZpm) [id number] peripheral zone lesion extending to the midline posterior urethra at the apex (0.9 cm, MO-4). No LAD. No major anatomic issues. Prostate biopsy 4/23/24 showed right posterior lateral base G 3+4 with 50% involvement, MRI target#1 left PZ G 4+3 among 3 cores with 60%, 55% and 55% involvement with intraductal component, MRI #2 target left PZ G 3+4 among 2 cores with 85% and 55% involvement with intraductal and cribriform component. (small prostate so limited number of sites per Dr. Umaña). PSMA PET 5/14/24 showed increased radiotracer activity in the left side of the prostate gland suggests intermediate PSA expression, with compatible metastatic disease indicated by 0.6x0.3cm left para-aortic/common iliac node SUV 7.0, a 5mm left common iliac node SUV 5.8, a 0.6x0.6cm left external iliac node SUV 10.1, PSA 7.96 and Testosterone on 272 5/20/24. He was started on Orgovyx by urology on 5/21/24   Interval History: Overall he has been doing well. He denies any issues with urination. No constipation or diarrhea. He reports that he is great. He remains active and goes to the gym 6 days per week, as well as, golf a couple days.    PMH/PSH: Appendectomy, hernia repair, testicular torsion repair, vasectomy, cervical spinal fusion; HTN (Edarbi, Amlodopine) /HLD (atorvastin)   Social: Never smoker, occasional alcohol use. 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 Has six children (age 31- 15 years)  Family History: Maternal Uncle with hx of bladder cancer    6/12/24: Patient is more fatigued than normal, says he that he normally goes to bed pretty early and is finding he wants to go to bed even earlier. He does note that since stopping  orgovyx and starting lupron and stephanie/pred the fatigue has actually improved. He was exhausted when taking orgovyx. He is having hot flashes throughout the day, says they are tolerable. Feels his metabolism may have slowed a bit, however has also been going to a lot of events (graduation parties etc) at which he is eating outside of his normal diet. He reports nocturia x 4, increased from 2-3. Endorses decreased libido, does find he is able to have erection with cialis. Has h/o cervical stenosis/fusion and had C2-C6 nerve block yesterday.    7/3/24: LFTs 6/28 gr II elevation, abiraterone held on 7/1, continued on prednisone. Overall patient has been feeling well, he has been motivated to exercise more due to the fact that he is on ADT and wants to counteract increased calorie intake / easier weight gain. He is doing his best to push through fatigue at certain times, but also takes a naps. He continues to follow with pain management and is planned for cervical nerve ablation. Ongoing vasomotor symptoms, says that a few nights ago he was up every hour with hot flashes, this is not the norm, discussed potential gabapentin, however patient has been on this medication previously and does not wish to start right now. Notes some urinary urgency and nocturia 2-4. Endorse little to no libido, does not think he has gotten spontaneous erections.    7/24/24 reports moderate fatigue, hot flash and sweating 10 times, loss of energy. Has normal urination.   8/16/24 pt is in general well-being. Appetite is good. maintaining weight. Exercise 1-2 times a day [work-out]. Continues to endorse moderate fatigue and drinks coffee which helps keep him active and about for about 1 hour. Hot flashes is a bit less but more often. No swelling or joint pain which he follows locally for management.  9/6/24 pt is doing well. Appetite is good and maintaining weight. BM is normal. Continues to exercise 1-2 times daily. Continues to endorse moderate fatigue and mild hot flash with occ sleep interruption.   10/14/24 reports mod fatigue, fluctuating hot flash and sweating  11/5/24 - continues on abiraterone 750mg daily. Overall feeling fatigued, depressed, gaining weight. Uncle passed away, another family member in the ICU. Having episodes of crying and feeling depressed, no SI. Ongoing hot flashes. Urine flow is "fine", no urgency, nocturia 3-5x/night. Occasional BLE edema. Had the spacer placed for RT, more discomfort. Denies fever, chills, night sweats, headache, dizziness, chest pain, palpitations, SOB, cough, nausea/vomiting, diarrhea/constipation, abdominal pain, dysuria, hematuria, incontinence, bleeding, muscle or joint pain/weakness. [de-identified] : G7(4+3) disease in intraductal component, 3/8 sampled sites  [de-identified] : 11/26/24 - abiraterone discontinued early Nov 2024 d/t grade 4 transaminitis. Main issue is perineal pain since spacer gel placement. He saw Dr. Angel yesterday and started Augmentin for suspected infection. MRI done today shows an abscess between the prostate and rectum. No actual fevers but has been taking Motrin ATC with subjective fevers and shaking chills at times. Denies chest pain, palpitations, SOB, cough, nausea/vomiting, diarrhea/constipation, abdominal pain.

## 2024-11-26 NOTE — REVIEW OF SYSTEMS
[Chills] : chills [Fever] : fever [Chest Pain] : no chest pain [Palpitations] : no palpitations [Shortness Of Breath] : no shortness of breath [Cough] : no cough [Abdominal Pain] : no abdominal pain

## 2024-12-09 NOTE — HISTORY OF PRESENT ILLNESS
[FreeTextEntry1] : Mr. Maynard is a 60-year-old male who presents for an on-treatment visit.  He is accompanied by his wife for today's visit.    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 Stock[place] score 4+3=7 in 1 site/3 cores, Carcinoma is seen involving adipose tissue, consistent with extraprostatic extension. Intraductal carcinoma of the prostate also present. Perineural invasion present. Anjelica score 3+4=7 in 2 sites/3 cores, Atypical intraductal proliferation (AIP) and Intraductal carcinoma of the prostate also present. 85% max involvement. Cribriform Stock[place] pattern 4 is present.  PSA 6.23 ng/mL. PSMA PET with few subcentimeter retroperitoneal and left pelvic lymph nodes with increased radiotracer activity are compatible with metastatic disease.  TREATMENT: 5/21/24 - Received Orgovyx x 2 weeks  6/5/24 - started on Lupron/Eligard injections  6/2024 - 11/2024: Abiraterone/Prednisone (dose reduced due to elevated LFTs), stopped due to persistent elevated LFTs  11/16/24 - started on RADIATION Therapy to Prostate/SV/Nodes, 7000 cGy in 28 fx   PSA Trend: 1/20/23 - 3.71 ng/mL 2/6/24 - 6.23 5/20/24 - 7.96           Testosterone 272.0 ng/dL  6/30/24 - 1.67 7/17/24 - 1.15 8/9/24 - 0.64 8/31/24 - 0.39           Testosterone < 2.5 9/19/24 - 0.32 10/12/24 - 0.22 11/4/24 - 0.19 11/11/24 - 0.14  11/18/24 - 0.21 *started radiation 11/16/24* 11/23/24 - 0.24   HPI:  Mr. Maynard originally presented to urology in March 2024 for evaluation of elevated PSA of 6.23 ng/mL. MRI ordered.   4/12/24 - Prostate MRI: Prostate volume 27 cc. PSA density: 0.23 ng/mL/mL. Lesion #1, Left, posterior medial-lateral (PZpm-pl), base-to-midgland, peripheral zone (16 x 9 x 15 mm). Tumor abuts capsule causing capsular irregularity. PIRADS 5 Lesion #2, Left, posterior medial (PZpm), [id number], peripheral zone lesion extends to the midline posterior urethra at the apex (7 x 3 x 9 mm). No extraprostatic extension noted. PIRADS 4 Lesion 1 abuts the left neurovascular bundle. Lesion 1 abuts the left seminal vesicle without definite invasion. No pelvic lymphadenopathy and no suspicious bone lesions identified.    4/23/24 - underwent Prostate Biopsy: Pathology - Adenocarcinoma of Prostate, 3/8 sites and 6/11 cores involved.  Anjelica score 4+3=7 in 1 site/3 cores (MRI target lesion #1), Carcinoma is seen involving adipose tissue, consistent with extraprostatic extension. Intraductal carcinoma of the prostate also present. Perineural invasion present.  Anjelica score 3+4=7 in 2 sites/3 cores (MRI target lesion and right posterior lateral base), Atypical intraductal proliferation (AIP) and Intraductal carcinoma of the prostate also present. 85% max involvement.  Note: Cribriform Stockton pattern 4 is present.    Mr. Maynard saw Dr. Umaña (urology) in follow up and biopsy results were reviewed.  Reviewed that definitive treatment would be recommended given the biopsy results.  PSMA PET ordered. Referrals to urology surgeon and radiation oncology made.  He then was seen by Dr. Pollock (urology) and Dr. Diamond ([facility]) in multidisciplinary clinic on 5/9/24, both treatment options were reviewed in detail.  Final decision about treatment to be made after PSMA PET.   5/14/24 - PSMA PET: 1. Foci of increased radiotracer activity in left side of prostate gland, extending from apex to base, corresponding to lesions identified on MRI/biopsy-proven prostate carcinoma. The intensity of radiotracer activity suggests intermediate PSMA expression. 2. Few subcentimeter retroperitoneal and left pelvic lymph nodes with increased radiotracer activity are compatible with metastatic disease.  5/22/24 - saw Dr. Rice ([facility]) in consultation. Discussed all imaging and pathology results.  He has newly diagnosed at least stage FARIDA prostate adenocarcinoma (up to Stockton 7 (4+3). PSMA PET 5/14/24 showed several SUV positive sub-centimeter pelvic lymph nodes. This indicates very high-risk feature for his newly diagnosed prostate cancer. The plan is to proceed with radiation and anti- hormone therapy. The recommended treatment approach in this setting (node positive disease) based on the STAMPEDE trial is adding 2-year abiraterone (+prednisone) to ADT plus RT as an option for patients with very high-risk prostate cancer.    6/2024 - started on Lupron/Eligard injections and Abiraterone/Prednisone with Dr. Rice ([facility])  7/1/24 - saw Dr. Nielsen ([facility]) in consultation as Dr. Diamond had left the practice.  Recommended external beam radiation therapy to the prostate, in addition to continuing hormone therapy, based on the STAMPEDE study findings. The radiation therapy would be given for 5 weeks (25 treatments) after observing the PSA levels on hormone therapy (typically 4-6 months). Will coordinate with the radiation oncology team at the [facility] in [place] for the patient's radiation treatment.    10/29/24 - Had Prostate Fiducial Markers/SpaceOAR Gel placed by Dr. Nielsen ([facility]) in preparation for radiation therapy.    11/5/24 - presented for follow up visit prior to SIM planning scan as he is to receive his radiation at [facility].  Mr. Maynard is feeling well today, appropriately anxious about next steps. He has nocturia of 4x (wakes due to hot flashes so urinates as well), frequency, and occasional urgency. No bowel issues, last colonoscopy was within the past year.  Poor erectile function has seen Dr. Ren. He continues to follow with Dr. Rice ([facility]) and is on Lupron injections and Abiraterone/Prednisone, as per patient told to hold Abiraterone due to elevated LFTs.  He is having hot flashes, decreased energy, and some depression since starting on Lupron injections and Abiraterone/Prednisone. He is looking forward to a trip to [place] at the end of December.  Met with wife and patient to discuss radiation option. Reviewed imaging. Pelvic lymph nodes could be covered by pelvic RT field, including all PSMA positive disease. PSA down to 0.19ng/mL. We discussed options for STAMPEDE RT vs RT to pelvis and prostate. Logisitics and possible acute and late side effects discussed. Opted for comprehensive RT to pelvis and prostate 28 fractions. Consent signed. Simulation today.  11/16/24 - started on RADIATION Therapy to Prostate/SV/Nodes, 7000 cGy in 28 fx with ADT and Abiraterone/Prednisone   11/18/24 - OTV to complete 2/28 fx today.  Reinforced what to expect with radiation therapy, possible side effects, as well the importance of bladder filling/bowel emptying. Diet reviewed.    11/25/24 - OTV 8/28 fx completed.  Having rectal pain and mild chills, difficulty sitting down, looking for soft chair. Evaluate for rectal abscess with MRI, otherwise doing ok with radiation, start antibiotic, and continue RT in the meantime.  11/26/24 - MRI Pelvis: Collection measuring 3.4 x 3.1 x 5.0 cm between the rectum and prostate consistent with an abscess (infection associated with the spacer gel). Collection extends into the anterior rectal wall and fistulizes into the right seminal vesicle.  Admitted to [facility] from 11/26/24 - 11/28/24 after MRI results for IVAB.  Seen by urology, colorectal surgery (no surgical intervention recommended), and infectious disease.  Discharged home to continue on oral antibiotics.    12/9/24 - presents today after hospitalization for IVAB prior to restarting on radiation therapy.  He had completed 8/28 fx on 11/25/24.  Mr. Maynard is feeling better, denies any chills, fever. No discharge or blood from rectum.  Still with discomfort and feeling of irritation but denies pain, remains on antibiotic at this time, due to complete at the end of the week.  Has not been taking any Tylenol or Ibuprofen. No diarrhea, stool is softer and more frequent possibly due to antibiotics.  Nocturia of 2-4x and increase in frequency, No dysuria or hematuria.   Is off Abiraterone/Prednisone since beginning of November due to elevated LFTs, last saw Dr. Rice ([facility]) on 11/26/24 next follow up on 12/20/24.

## 2024-12-09 NOTE — REVIEW OF SYSTEMS
[Constipation: Grade 0] : Constipation: Grade 0 [Diarrhea: Grade 0] : Diarrhea: Grade 0 [Proctitis: Grade 0] : Proctitis: Grade 0 [Rectal Pain: Grade 0] : Rectal Pain: Grade 0 [Fatigue: Grade 1 - Fatigue relieved by rest] : Fatigue: Grade 1 - Fatigue relieved by rest [Hematuria: Grade 0] : Hematuria: Grade 0 [Urinary Incontinence: Grade 0] : Urinary Incontinence: Grade 0  [Urinary Retention: Grade 0] : Urinary Retention: Grade 0 [Urinary Tract Pain: Grade 0] : Urinary Tract Pain: Grade 0 [Urinary Urgency: Grade 0] : Urinary Urgency: Grade 0 [Urinary Frequency: Grade 1 - Present] : Urinary Frequency: Grade 1 - Present [Erectile Dysfunction: Grade 2 - Decrease in erectile function (frequency/rigidity of erections), erectile intervention indicated, (e.g., medication or mechanical devices such as penile pump)] : Erectile Dysfunction: Grade 2 - Decrease in erectile function (frequency/rigidity of erections), erectile intervention indicated, (e.g., medication or mechanical devices such as penile pump) [Ejaculation Disorder: Grade 2 - Anejaculation or retrograde ejaculation] : Ejaculation Disorder: Grade 2 - Anejaculation or retrograde ejaculation [FreeTextEntry3] : stool is softer  [de-identified] : discomfort/irritation  [FreeTextEntry5] : occasional  [FreeTextEntry9] : nocturia 2-4x

## 2024-12-09 NOTE — DISEASE MANAGEMENT
[0-10] : 0 -10 ng/mL [Biopsy with Fusion] : Patient had a biopsy with fusion on [7(3+4)] : Template Biopsy [place] Score: 7(3+4) [7(4+3)] : Fusion Biopsy [place] Score: 7(4+3) [] : Patient had a Prostate MRI [5] : 5 [Pathological] : TNM Stage: p [FARIDA] : FARIDA [Radiation Therapy] : Radiation Therapy [Androgen Ablation] : Androgen Ablation [Treatment with androgen ablation] : Treatment with androgen ablation [BiopsyDate] : 04/23/2024 [MeasuredProstateVolume] : 27 [TotalCores] : 11 [TotalPositiveCores] : 6 [MaxCoreInvolvement] : 85 [TTNM] : 3a [NTNM] : 1 [MTNM] : 0 [de-identified] : 2000cGy [de-identified] : 7000cGy [de-identified] : Prostate/SV/Nodes with ADT and Abiraterone/Prednisone  [ADTStartedDate] : 6/2024

## 2024-12-09 NOTE — REVIEW OF SYSTEMS
[Constipation: Grade 0] : Constipation: Grade 0 [Diarrhea: Grade 0] : Diarrhea: Grade 0 [Proctitis: Grade 0] : Proctitis: Grade 0 [Rectal Pain: Grade 0] : Rectal Pain: Grade 0 [Fatigue: Grade 1 - Fatigue relieved by rest] : Fatigue: Grade 1 - Fatigue relieved by rest [Hematuria: Grade 0] : Hematuria: Grade 0 [Urinary Incontinence: Grade 0] : Urinary Incontinence: Grade 0  [Urinary Retention: Grade 0] : Urinary Retention: Grade 0 [Urinary Tract Pain: Grade 0] : Urinary Tract Pain: Grade 0 [Urinary Urgency: Grade 0] : Urinary Urgency: Grade 0 [Urinary Frequency: Grade 1 - Present] : Urinary Frequency: Grade 1 - Present [Erectile Dysfunction: Grade 2 - Decrease in erectile function (frequency/rigidity of erections), erectile intervention indicated, (e.g., medication or mechanical devices such as penile pump)] : Erectile Dysfunction: Grade 2 - Decrease in erectile function (frequency/rigidity of erections), erectile intervention indicated, (e.g., medication or mechanical devices such as penile pump) [Ejaculation Disorder: Grade 2 - Anejaculation or retrograde ejaculation] : Ejaculation Disorder: Grade 2 - Anejaculation or retrograde ejaculation [FreeTextEntry3] : stool is softer  [de-identified] : discomfort/irritation  [FreeTextEntry5] : occasional  [FreeTextEntry9] : nocturia 2-4x

## 2024-12-09 NOTE — HISTORY OF PRESENT ILLNESS
[FreeTextEntry1] : Mr. Maynard is a 60-year-old male who presents for an on-treatment visit.  He is accompanied by his wife for today's visit.    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 Rio score 4+3=7 in 1 site/3 cores, Carcinoma is seen involving adipose tissue, consistent with extraprostatic extension. Intraductal carcinoma of the prostate also present. Perineural invasion present. Anjelica score 3+4=7 in 2 sites/3 cores, Atypical intraductal proliferation (AIP) and Intraductal carcinoma of the prostate also present. 85% max involvement. Cribriform Rio pattern 4 is present.  PSA 6.23 ng/mL. PSMA PET with few subcentimeter retroperitoneal and left pelvic lymph nodes with increased radiotracer activity are compatible with metastatic disease.  TREATMENT: 5/21/24 - Received Orgovyx x 2 weeks  6/5/24 - started on Lupron/Eligard injections  6/2024 - 11/2024: Abiraterone/Prednisone (dose reduced due to elevated LFTs), stopped due to persistent elevated LFTs  11/16/24 - started on RADIATION Therapy to Prostate/SV/Nodes, 7000 cGy in 28 fx   PSA Trend: 1/20/23 - 3.71 ng/mL 2/6/24 - 6.23 5/20/24 - 7.96           Testosterone 272.0 ng/dL  6/30/24 - 1.67 7/17/24 - 1.15 8/9/24 - 0.64 8/31/24 - 0.39           Testosterone < 2.5 9/19/24 - 0.32 10/12/24 - 0.22 11/4/24 - 0.19 11/11/24 - 0.14  11/18/24 - 0.21 *started radiation 11/16/24* 11/23/24 - 0.24   HPI:  Mr. Maynard originally presented to urology in March 2024 for evaluation of elevated PSA of 6.23 ng/mL. MRI ordered.   4/12/24 - Prostate MRI: Prostate volume 27 cc. PSA density: 0.23 ng/mL/mL. Lesion #1, Left, posterior medial-lateral (PZpm-pl), base-to-midgland, peripheral zone (16 x 9 x 15 mm). Tumor abuts capsule causing capsular irregularity. PIRADS 5 Lesion #2, Left, posterior medial (PZpm), [id number]-[id number], peripheral zone lesion extends to the midline posterior urethra at the apex (7 x 3 x 9 mm). No extraprostatic extension noted. PIRADS 4 Lesion 1 abuts the left neurovascular bundle. Lesion 1 abuts the left seminal vesicle without definite invasion. No pelvic lymphadenopathy and no suspicious bone lesions identified.    4/23/24 - underwent Prostate Biopsy: Pathology - Adenocarcinoma of Prostate, 3/8 sites and 6/11 cores involved.  Anjelica score 4+3=7 in 1 site/3 cores (MRI target lesion #1), Carcinoma is seen involving adipose tissue, consistent with extraprostatic extension. Intraductal carcinoma of the prostate also present. Perineural invasion present.  Anjelica score 3+4=7 in 2 sites/3 cores (MRI target lesion and right posterior lateral base), Atypical intraductal proliferation (AIP) and Intraductal carcinoma of the prostate also present. 85% max involvement.  Note: Cribriform Rio pattern 4 is present.    Mr. Maynard saw Dr. Umaña (urology) in follow up and biopsy results were reviewed.  Reviewed that definitive treatment would be recommended given the biopsy results.  PSMA PET ordered. Referrals to urology surgeon and radiation oncology made.  He then was seen by Dr. Pollock (urology) and Dr. Diamond ([facility]) in multidisciplinary clinic on 5/9/24, both treatment options were reviewed in detail.  Final decision about treatment to be made after PSMA PET.   5/14/24 - PSMA PET: 1. Foci of increased radiotracer activity in left side of prostate gland, extending from apex to base, corresponding to lesions identified on MRI/biopsy-proven prostate carcinoma. The intensity of radiotracer activity suggests intermediate PSMA expression. 2. Few subcentimeter retroperitoneal and left pelvic lymph nodes with increased radiotracer activity are compatible with metastatic disease.  5/22/24 - saw Dr. Rice ([facility]) in consultation. Discussed all imaging and pathology results.  He has newly diagnosed at least stage FARIDA prostate adenocarcinoma (up to Rio 7 (4+3). PSMA PET 5/14/24 showed several SUV positive sub-centimeter pelvic lymph nodes. This indicates very high-risk feature for his newly diagnosed prostate cancer. The plan is to proceed with radiation and anti- hormone therapy. The recommended treatment approach in this setting (node positive disease) based on the STAMPEDE trial is adding 2-year abiraterone (+prednisone) to ADT plus RT as an option for patients with very high-risk prostate cancer.    6/2024 - started on Lupron/Eligard injections and Abiraterone/Prednisone with Dr. Rice ([facility])  7/1/24 - saw Dr. Nielsen ([facility]) in consultation as Dr. Diamond had left the practice.  Recommended external beam radiation therapy to the prostate, in addition to continuing hormone therapy, based on the STAMPEDE study findings. The radiation therapy would be given for 5 weeks (25 treatments) after observing the PSA levels on hormone therapy (typically 4-6 months). Will coordinate with the radiation oncology team at the [facility] in [place] for the patient's radiation treatment.    10/29/24 - Had Prostate Fiducial Markers/SpaceOAR Gel placed by Dr. Nielsen ([facility]) in preparation for radiation therapy.    11/5/24 - presented for follow up visit prior to SIM planning scan as he is to receive his radiation at [facility].  Mr. Maynard is feeling well today, appropriately anxious about next steps. He has nocturia of 4x (wakes due to hot flashes so urinates as well), frequency, and occasional urgency. No bowel issues, last colonoscopy was within the past year.  Poor erectile function has seen Dr. Ren. He continues to follow with Dr. Rice ([facility]) and is on Lupron injections and Abiraterone/Prednisone, as per patient told to hold Abiraterone due to elevated LFTs.  He is having hot flashes, decreased energy, and some depression since starting on Lupron injections and Abiraterone/Prednisone. He is looking forward to a trip to [place] at the end of December.  Met with wife and patient to discuss radiation option. Reviewed imaging. Pelvic lymph nodes could be covered by pelvic RT field, including all PSMA positive disease. PSA down to 0.19ng/mL. We discussed options for STAMPEDE RT vs RT to pelvis and prostate. Logisitics and possible acute and late side effects discussed. Opted for comprehensive RT to pelvis and prostate 28 fractions. Consent signed. Simulation today.  11/16/24 - started on RADIATION Therapy to Prostate/SV/Nodes, 7000 cGy in 28 fx with ADT and Abiraterone/Prednisone   11/18/24 - OTV to complete 2/28 fx today.  Reinforced what to expect with radiation therapy, possible side effects, as well the importance of bladder filling/bowel emptying. Diet reviewed.    11/25/24 - OTV 8/28 fx completed.  Having rectal pain and mild chills, difficulty sitting down, looking for soft chair. Evaluate for rectal abscess with MRI, otherwise doing ok with radiation, start antibiotic, and continue RT in the meantime.  11/26/24 - MRI Pelvis: Collection measuring 3.4 x 3.1 x 5.0 cm between the rectum and prostate consistent with an abscess (infection associated with the spacer gel). Collection extends into the anterior rectal wall and fistulizes into the right seminal vesicle.  Admitted to [facility] from 11/26/24 - 11/28/24 after MRI results for IVAB.  Seen by urology, colorectal surgery (no surgical intervention recommended), and infectious disease.  Discharged home to continue on oral antibiotics.    12/9/24 - presents today after hospitalization for IVAB prior to restarting on radiation therapy.  He had completed 8/28 fx on 11/25/24.  Mr. Maynard is feeling better, denies any chills, fever. No discharge or blood from rectum.  Still with discomfort and feeling of irritation but denies pain, remains on antibiotic at this time, due to complete at the end of the week.  Has not been taking any Tylenol or Ibuprofen. No diarrhea, stool is softer and more frequent possibly due to antibiotics.  Nocturia of 2-4x and increase in frequency, No dysuria or hematuria.   Is off Abiraterone/Prednisone since beginning of November due to elevated LFTs, last saw Dr. Rice ([facility]) on 11/26/24 next follow up on 12/20/24.

## 2024-12-09 NOTE — DISEASE MANAGEMENT
[0-10] : 0 -10 ng/mL [Biopsy with Fusion] : Patient had a biopsy with fusion on [7(3+4)] : Template Biopsy [place] Score: 7(3+4) [7(4+3)] : Fusion Biopsy [place] Score: 7(4+3) [] : Patient had a Prostate MRI [5] : 5 [Pathological] : TNM Stage: p [FARIDA] : FARIDA [Radiation Therapy] : Radiation Therapy [Androgen Ablation] : Androgen Ablation [Treatment with androgen ablation] : Treatment with androgen ablation [BiopsyDate] : 04/23/2024 [MeasuredProstateVolume] : 27 [TotalCores] : 11 [TotalPositiveCores] : 6 [MaxCoreInvolvement] : 85 [TTNM] : 3a [NTNM] : 1 [MTNM] : 0 [de-identified] : 2000cGy [de-identified] : 7000cGy [de-identified] : Prostate/SV/Nodes with ADT and Abiraterone/Prednisone  [ADTStartedDate] : 6/2024

## 2024-12-09 NOTE — VITALS
[Maximal Pain Intensity: 0/10] : 0/10 [NoTreatment Scheduled] : no treatment scheduled [Maximal Pain Intensity: 3/10] : 3/10 [Least Pain Intensity: 0/10] : 0/10 [Pain Description/Quality: ___] : Pain description/quality: [unfilled] [Pain Duration: ___] : Pain duration: [unfilled] [Pain Location: ___] : Pain Location: [unfilled] [90: Able to carry normal activity; minor signs or symptoms of disease.] : 90: Able to carry normal activity; minor signs or symptoms of disease.  [ECOG Performance Status: 0 - Fully active, able to carry on all pre-disease performance without restriction] : Performance Status: 0 - Fully active, able to carry on all pre-disease performance without restriction

## 2024-12-16 NOTE — HISTORY OF PRESENT ILLNESS
[FreeTextEntry1] : Mr. Maynard is a 60-year-old male who presents for an on-treatment visit.  He is unaccompanied for today's visit.    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 Anjelica score 4+3=7 in 1 site/3 cores, Carcinoma is seen involving adipose tissue, consistent with extraprostatic extension. Intraductal carcinoma of the prostate also present. Perineural invasion present. Anjelica score 3+4=7 in 2 sites/3 cores, Atypical intraductal proliferation (AIP) and Intraductal carcinoma of the prostate also present. 85% max involvement. Cribriform Spruce pattern 4 is present.  PSA 6.23 ng/mL. PSMA PET with few subcentimeter retroperitoneal and left pelvic lymph nodes with increased radiotracer activity are compatible with metastatic disease.  TREATMENT: 5/21/24 - Received Orgovyx x 2 weeks  6/5/24 - started on Lupron/Eligard injections  6/2024 - 11/2024: Abiraterone/Prednisone (dose reduced due to elevated LFTs), stopped due to persistent elevated LFTs  11/16/24 - started on RADIATION Therapy to Prostate/SV/Nodes, 7000 cGy in 28 fx   PSA Trend: 1/20/23 - 3.71 ng/mL 2/6/24 - 6.23 5/20/24 - 7.96           Testosterone 272.0 ng/dL  6/30/24 - 1.67 7/17/24 - 1.15 8/9/24 - 0.64 8/31/24 - 0.39           Testosterone < 2.5 9/19/24 - 0.32 10/12/24 - 0.22 11/4/24 - 0.19 11/11/24 - 0.14  11/18/24 - 0.21 *started radiation 11/16/24* 11/23/24 - 0.24   HPI:  Mr. Maynard originally presented to urology in March 2024 for evaluation of elevated PSA of 6.23 ng/mL. MRI ordered.   4/12/24 - Prostate MRI: Prostate volume 27 cc. PSA density: 0.23 ng/mL/mL. Lesion #1, Left, posterior medial-lateral (PZpm-pl), base-to-midgland, peripheral zone (16 x 9 x 15 mm). Tumor abuts capsule causing capsular irregularity. PIRADS 5 Lesion #2, Left, posterior medial (PZpm), [id number], peripheral zone lesion extends to the midline posterior urethra at the apex (7 x 3 x 9 mm). No extraprostatic extension noted. PIRADS 4 Lesion 1 abuts the left neurovascular bundle. Lesion 1 abuts the left seminal vesicle without definite invasion. No pelvic lymphadenopathy and no suspicious bone lesions identified.    4/23/24 - underwent Prostate Biopsy: Pathology - Adenocarcinoma of Prostate, 3/8 sites and 6/11 cores involved.  Spruce score 4+3=7 in 1 site/3 cores (MRI target lesion #1), Carcinoma is seen involving adipose tissue, consistent with extraprostatic extension. Intraductal carcinoma of the prostate also present. Perineural invasion present.  Anjelica score 3+4=7 in 2 sites/3 cores (MRI target lesion and right posterior lateral base), Atypical intraductal proliferation (AIP) and Intraductal carcinoma of the prostate also present. 85% max involvement.  Note: Cribriform Anjelica pattern 4 is present.    Mr. Maynard saw Dr. Umaña (urology) in follow up and biopsy results were reviewed.  Reviewed that definitive treatment would be recommended given the biopsy results.  PSMA PET ordered. Referrals to urology surgeon and radiation oncology made.  He then was seen by Dr. Pollock (urology) and Dr. Diamond ([facility]) in multidisciplinary clinic on 5/9/24, both treatment options were reviewed in detail.  Final decision about treatment to be made after PSMA PET.   5/14/24 - PSMA PET: 1. Foci of increased radiotracer activity in left side of prostate gland, extending from apex to base, corresponding to lesions identified on MRI/biopsy-proven prostate carcinoma. The intensity of radiotracer activity suggests intermediate PSMA expression. 2. Few subcentimeter retroperitoneal and left pelvic lymph nodes with increased radiotracer activity are compatible with metastatic disease.  5/22/24 - saw Dr. Rice ([facility]) in consultation. Discussed all imaging and pathology results.  He has newly diagnosed at least stage FARIDA prostate adenocarcinoma (up to Anjelica 7 (4+3). PSMA PET 5/14/24 showed several SUV positive sub-centimeter pelvic lymph nodes. This indicates very high-risk feature for his newly diagnosed prostate cancer. The plan is to proceed with radiation and anti- hormone therapy. The recommended treatment approach in this setting (node positive disease) based on the STAMPEDE trial is adding 2-year abiraterone (+prednisone) to ADT plus RT as an option for patients with very high-risk prostate cancer.    6/2024 - started on Lupron/Eligard injections and Abiraterone/Prednisone with Dr. Rice ([facility])  7/1/24 - saw Dr. Nielsen ([facility]) in consultation as Dr. Diamond had left the practice.  Recommended external beam radiation therapy to the prostate, in addition to continuing hormone therapy, based on the STAMPEDE study findings. The radiation therapy would be given for 5 weeks (25 treatments) after observing the PSA levels on hormone therapy (typically 4-6 months). Will coordinate with the radiation oncology team at the [facility] in [place] for the patient's radiation treatment.    10/29/24 - Had Prostate Fiducial Markers/SpaceOAR Gel placed by Dr. Nielsen ([facility]) in preparation for radiation therapy.    11/5/24 - presented for follow up visit prior to SIM planning scan as he is to receive his radiation at [facility].  Mr. Maynard is feeling well today, appropriately anxious about next steps. He has nocturia of 4x (wakes due to hot flashes so urinates as well), frequency, and occasional urgency. No bowel issues, last colonoscopy was within the past year.  Poor erectile function has seen Dr. Ren. He continues to follow with Dr. Rice ([facility]) and is on Lupron injections and Abiraterone/Prednisone, as per patient told to hold Abiraterone due to elevated LFTs.  He is having hot flashes, decreased energy, and some depression since starting on Lupron injections and Abiraterone/Prednisone. He is looking forward to a trip to [place] at the end of December.  Met with wife and patient to discuss radiation option. Reviewed imaging. Pelvic lymph nodes could be covered by pelvic RT field, including all PSMA positive disease. PSA down to 0.19ng/mL. We discussed options for STAMPEDE RT vs RT to pelvis and prostate. Logisitics and possible acute and late side effects discussed. Opted for comprehensive RT to pelvis and prostate 28 fractions. Consent signed. Simulation today.  11/16/24 - started on RADIATION Therapy to Prostate/SV/Nodes, 7000 cGy in 28 fx with ADT and Abiraterone/Prednisone   11/18/24 - OTV to complete 2/28 fx today.  Reinforced what to expect with radiation therapy, possible side effects, as well the importance of bladder filling/bowel emptying. Diet reviewed.    11/25/24 - OTV 8/28 fx completed.  Having rectal pain and mild chills, difficulty sitting down, looking for soft chair. Evaluate for rectal abscess with MRI, otherwise doing ok with radiation, start antibiotic, and continue RT in the meantime.  11/26/24 - MRI Pelvis: Collection measuring 3.4 x 3.1 x 5.0 cm between the rectum and prostate consistent with an abscess (infection associated with the spacer gel). Collection extends into the anterior rectal wall and fistulizes into the right seminal vesicle.  Admitted to [facility] from 11/26/24 - 11/28/24 after MRI results for IVAB.  Seen by urology, colorectal surgery (no surgical intervention recommended), and infectious disease.  Discharged home to continue on oral antibiotics.    12/9/24 - presents today after hospitalization for IVAB prior to restarting on radiation therapy.  He had completed 8/28 fx on 11/25/24.  Mr. Maynard is feeling better, denies any chills, fever. No discharge or blood from rectum.  Still with discomfort and feeling of irritation but denies pain, remains on antibiotic at this time, due to complete at the end of the week.  Has not been taking any Tylenol or Ibuprofen. No diarrhea, stool is softer and more frequent possibly due to antibiotics.  Nocturia of 2-4x and increase in frequency, No dysuria or hematuria.   Is off Abiraterone/Prednisone since beginning of November due to elevated LFTs, last saw Dr. Rice ([facility]) on 11/26/24 next follow up on 12/20/24.  Admitted for antibiotics for rectal abscess, now been off treatment for 2 weeks. Completed 8 fractions prior to starting RT. Now feeling much better. Only very mild symptoms in rectum. No fevers. Continuing on antibiotics. Discussed pros and cons of restarting RT vs. waiting longer for healing. Plan to restart today with regular review. He will continue ABx over the weekend as is tolerating well. He has my contact details to contact me at any time.  12/16/24 - OTV 14/28 fx completed. Mr. Maynard continues on treatment. He has an increase in tiredness/fatigue. Remains on antibiotics at this time, to complete on Thursday.  No rectal pain, some mild discomfort remains. No fever or chills. No blood or mucus from the rectum.  Urinary symptoms remain stable, adequate stream, no dysuria or hematuria.

## 2024-12-16 NOTE — REVIEW OF SYSTEMS
[FreeTextEntry3] : stool is softer  [de-identified] : discomfort/irritation  [FreeTextEntry5] : occasional  [FreeTextEntry9] : nocturia 2-4x

## 2024-12-16 NOTE — HISTORY OF PRESENT ILLNESS
[FreeTextEntry1] : Mr. Maynard is a 60-year-old male who presents for an on-treatment visit.  He is unaccompanied for today's visit.    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 Anjelica score 4+3=7 in 1 site/3 cores, Carcinoma is seen involving adipose tissue, consistent with extraprostatic extension. Intraductal carcinoma of the prostate also present. Perineural invasion present. Anjelica score 3+4=7 in 2 sites/3 cores, Atypical intraductal proliferation (AIP) and Intraductal carcinoma of the prostate also present. 85% max involvement. Cribriform [place] pattern 4 is present.  PSA 6.23 ng/mL. PSMA PET with few subcentimeter retroperitoneal and left pelvic lymph nodes with increased radiotracer activity are compatible with metastatic disease.  TREATMENT: 5/21/24 - Received Orgovyx x 2 weeks  6/5/24 - started on Lupron/Eligard injections  6/2024 - 11/2024: Abiraterone/Prednisone (dose reduced due to elevated LFTs), stopped due to persistent elevated LFTs  11/16/24 - started on RADIATION Therapy to Prostate/SV/Nodes, 7000 cGy in 28 fx   PSA Trend: 1/20/23 - 3.71 ng/mL 2/6/24 - 6.23 5/20/24 - 7.96           Testosterone 272.0 ng/dL  6/30/24 - 1.67 7/17/24 - 1.15 8/9/24 - 0.64 8/31/24 - 0.39           Testosterone < 2.5 9/19/24 - 0.32 10/12/24 - 0.22 11/4/24 - 0.19 11/11/24 - 0.14  11/18/24 - 0.21 *started radiation 11/16/24* 11/23/24 - 0.24   HPI:  Mr. Maynard originally presented to urology in March 2024 for evaluation of elevated PSA of 6.23 ng/mL. MRI ordered.   4/12/24 - Prostate MRI: Prostate volume 27 cc. PSA density: 0.23 ng/mL/mL. Lesion #1, Left, posterior medial-lateral (PZpm-pl), base-to-midgland, peripheral zone (16 x 9 x 15 mm). Tumor abuts capsule causing capsular irregularity. PIRADS 5 Lesion #2, Left, posterior medial (PZpm), [id number], peripheral zone lesion extends to the midline posterior urethra at the apex (7 x 3 x 9 mm). No extraprostatic extension noted. PIRADS 4 Lesion 1 abuts the left neurovascular bundle. Lesion 1 abuts the left seminal vesicle without definite invasion. No pelvic lymphadenopathy and no suspicious bone lesions identified.    4/23/24 - underwent Prostate Biopsy: Pathology - Adenocarcinoma of Prostate, 3/8 sites and 6/11 cores involved.  Dallas score 4+3=7 in 1 site/3 cores (MRI target lesion #1), Carcinoma is seen involving adipose tissue, consistent with extraprostatic extension. Intraductal carcinoma of the prostate also present. Perineural invasion present.  Anjelica score 3+4=7 in 2 sites/3 cores (MRI target lesion and right posterior lateral base), Atypical intraductal proliferation (AIP) and Intraductal carcinoma of the prostate also present. 85% max involvement.  Note: Cribriform Anjelica pattern 4 is present.    Mr. Maynard saw Dr. Umaña (urology) in follow up and biopsy results were reviewed.  Reviewed that definitive treatment would be recommended given the biopsy results.  PSMA PET ordered. Referrals to urology surgeon and radiation oncology made.  He then was seen by Dr. Pollock (urology) and Dr. Diamond ([facility]) in multidisciplinary clinic on 5/9/24, both treatment options were reviewed in detail.  Final decision about treatment to be made after PSMA PET.   5/14/24 - PSMA PET: 1. Foci of increased radiotracer activity in left side of prostate gland, extending from apex to base, corresponding to lesions identified on MRI/biopsy-proven prostate carcinoma. The intensity of radiotracer activity suggests intermediate PSMA expression. 2. Few subcentimeter retroperitoneal and left pelvic lymph nodes with increased radiotracer activity are compatible with metastatic disease.  5/22/24 - saw Dr. Rice ([facility]) in consultation. Discussed all imaging and pathology results.  He has newly diagnosed at least stage FARIDA prostate adenocarcinoma (up to [place] 7 (4+3). PSMA PET 5/14/24 showed several SUV positive sub-centimeter pelvic lymph nodes. This indicates very high-risk feature for his newly diagnosed prostate cancer. The plan is to proceed with radiation and anti- hormone therapy. The recommended treatment approach in this setting (node positive disease) based on the STAMPEDE trial is adding 2-year abiraterone (+prednisone) to ADT plus RT as an option for patients with very high-risk prostate cancer.    6/2024 - started on Lupron/Eligard injections and Abiraterone/Prednisone with Dr. Rice ([facility])  7/1/24 - saw Dr. Nielsen ([facility]) in consultation as Dr. Diamond had left the practice.  Recommended external beam radiation therapy to the prostate, in addition to continuing hormone therapy, based on the STAMPEDE study findings. The radiation therapy would be given for 5 weeks (25 treatments) after observing the PSA levels on hormone therapy (typically 4-6 months). Will coordinate with the radiation oncology team at the [facility] in [place] for the patient's radiation treatment.    10/29/24 - Had Prostate Fiducial Markers/SpaceOAR Gel placed by Dr. Nielsen ([facility]) in preparation for radiation therapy.    11/5/24 - presented for follow up visit prior to SIM planning scan as he is to receive his radiation at [facility].  Mr. Maynard is feeling well today, appropriately anxious about next steps. He has nocturia of 4x (wakes due to hot flashes so urinates as well), frequency, and occasional urgency. No bowel issues, last colonoscopy was within the past year.  Poor erectile function has seen Dr. Ren. He continues to follow with Dr. Rice ([facility]) and is on Lupron injections and Abiraterone/Prednisone, as per patient told to hold Abiraterone due to elevated LFTs.  He is having hot flashes, decreased energy, and some depression since starting on Lupron injections and Abiraterone/Prednisone. He is looking forward to a trip to [place] at the end of December.  Met with wife and patient to discuss radiation option. Reviewed imaging. Pelvic lymph nodes could be covered by pelvic RT field, including all PSMA positive disease. PSA down to 0.19ng/mL. We discussed options for STAMPEDE RT vs RT to pelvis and prostate. Logisitics and possible acute and late side effects discussed. Opted for comprehensive RT to pelvis and prostate 28 fractions. Consent signed. Simulation today.  11/16/24 - started on RADIATION Therapy to Prostate/SV/Nodes, 7000 cGy in 28 fx with ADT and Abiraterone/Prednisone   11/18/24 - OTV to complete 2/28 fx today.  Reinforced what to expect with radiation therapy, possible side effects, as well the importance of bladder filling/bowel emptying. Diet reviewed.    11/25/24 - OTV 8/28 fx completed.  Having rectal pain and mild chills, difficulty sitting down, looking for soft chair. Evaluate for rectal abscess with MRI, otherwise doing ok with radiation, start antibiotic, and continue RT in the meantime.  11/26/24 - MRI Pelvis: Collection measuring 3.4 x 3.1 x 5.0 cm between the rectum and prostate consistent with an abscess (infection associated with the spacer gel). Collection extends into the anterior rectal wall and fistulizes into the right seminal vesicle.  Admitted to [facility] from 11/26/24 - 11/28/24 after MRI results for IVAB.  Seen by urology, colorectal surgery (no surgical intervention recommended), and infectious disease.  Discharged home to continue on oral antibiotics.    12/9/24 - presents today after hospitalization for IVAB prior to restarting on radiation therapy.  He had completed 8/28 fx on 11/25/24.  Mr. Maynard is feeling better, denies any chills, fever. No discharge or blood from rectum.  Still with discomfort and feeling of irritation but denies pain, remains on antibiotic at this time, due to complete at the end of the week.  Has not been taking any Tylenol or Ibuprofen. No diarrhea, stool is softer and more frequent possibly due to antibiotics.  Nocturia of 2-4x and increase in frequency, No dysuria or hematuria.   Is off Abiraterone/Prednisone since beginning of November due to elevated LFTs, last saw Dr. Rice ([facility]) on 11/26/24 next follow up on 12/20/24.  Admitted for antibiotics for rectal abscess, now been off treatment for 2 weeks. Completed 8 fractions prior to starting RT. Now feeling much better. Only very mild symptoms in rectum. No fevers. Continuing on antibiotics. Discussed pros and cons of restarting RT vs. waiting longer for healing. Plan to restart today with regular review. He will continue ABx over the weekend as is tolerating well. He has my contact details to contact me at any time.  12/16/24 - OTV 14/28 fx completed. Mr. Maynard continues on treatment. He has an increase in tiredness/fatigue. Remains on antibiotics at this time, to complete on Thursday.  No rectal pain, some mild discomfort remains. No fever or chills. No blood or mucus from the rectum.  Urinary symptoms remain stable, adequate stream, no dysuria or hematuria.

## 2024-12-16 NOTE — DISEASE MANAGEMENT
[BiopsyDate] : 04/23/2024 [MeasuredProstateVolume] : 27 [TotalCores] : 11 [TotalPositiveCores] : 6 [MaxCoreInvolvement] : 85 [TTNM] : 3a [NTNM] : 1 [MTNM] : 0 [de-identified] : 3500cGy [de-identified] : 7000cGy [de-identified] : Prostate/SV/Nodes with ADT and Abiraterone/Prednisone  [ADTStartedDate] : 6/2024

## 2024-12-16 NOTE — VITALS
[Maximal Pain Intensity: 3/10] : 3/10 [Least Pain Intensity: 0/10] : 0/10 [90: Able to carry normal activity; minor signs or symptoms of disease.] : 90: Able to carry normal activity; minor signs or symptoms of disease.  [ECOG Performance Status: 1 - Restricted in physically strenuous activity but ambulatory and able to carry out work of a light or sedentary nature] : Performance Status: 1 - Restricted in physically strenuous activity but ambulatory and able to carry out work of a light or sedentary nature, e.g., light house work, office work

## 2024-12-16 NOTE — REVIEW OF SYSTEMS
[FreeTextEntry3] : stool is softer  [de-identified] : discomfort/irritation  [FreeTextEntry5] : occasional  [FreeTextEntry9] : nocturia 2-4x

## 2024-12-16 NOTE — DISEASE MANAGEMENT
[BiopsyDate] : 04/23/2024 [MeasuredProstateVolume] : 27 [TotalCores] : 11 [TotalPositiveCores] : 6 [MaxCoreInvolvement] : 85 [TTNM] : 3a [NTNM] : 1 [MTNM] : 0 [de-identified] : 3500cGy [de-identified] : 7000cGy [de-identified] : Prostate/SV/Nodes with ADT and Abiraterone/Prednisone  [ADTStartedDate] : 6/2024

## 2024-12-20 NOTE — REVIEW OF SYSTEMS
[Fever] : fever [Chills] : chills [Chest Pain] : no chest pain [Palpitations] : no palpitations [Shortness Of Breath] : no shortness of breath [Cough] : no cough [Abdominal Pain] : no abdominal pain

## 2024-12-20 NOTE — HISTORY OF PRESENT ILLNESS
[Disease: _____________________] : Disease: [unfilled] [T: ___] : T[unfilled] [N: ___] : N[unfilled] [AJCC Stage: ____] : AJCC Stage: [unfilled] [de-identified] : 59 year old male who is presenting for initial medical oncology evaluation for prostate adenocarcinoma. He noted a significant rise in his PSA to 6.23 on 2/6/24 (previous 3.71 on 1/20/23 and 1.11 on 4/1/16). MRI 4/12/24 showed 4.4 x 2.9 x 4.1 cm = 27 cc gland, with dominant lesions in the left posterior medial-lateral (PZpm-pl) base-to-midgland peripheral zone (1.6 cm, HI-5), with concern for capsular involvement but no clear EPE, abutting the left NVB and SV without clear invasion, and left posterior medial (PZpm) [id number] peripheral zone lesion extending to the midline posterior urethra at the apex (0.9 cm, HI-4). No LAD. No major anatomic issues. Prostate biopsy 4/23/24 showed right posterior lateral base G 3+4 with 50% involvement, MRI target#1 left PZ G 4+3 among 3 cores with 60%, 55% and 55% involvement with intraductal component, MRI #2 target left PZ G 3+4 among 2 cores with 85% and 55% involvement with intraductal and cribriform component. (small prostate so limited number of sites per Dr. Umaña). PSMA PET 5/14/24 showed increased radiotracer activity in the left side of the prostate gland suggests intermediate PSA expression, with compatible metastatic disease indicated by 0.6x0.3cm left para-aortic/common iliac node SUV 7.0, a 5mm left common iliac node SUV 5.8, a 0.6x0.6cm left external iliac node SUV 10.1, PSA 7.96 and Testosterone on 272 5/20/24. He was started on Orgovyx by urology on 5/21/24   Interval History: Overall he has been doing well. He denies any issues with urination. No constipation or diarrhea. He reports that he is great. He remains active and goes to the gym 6 days per week, as well as, golf a couple days.    PMH/PSH: Appendectomy, hernia repair, testicular torsion repair, vasectomy, cervical spinal fusion; HTN (Edarbi, Amlodopine) /HLD (atorvastin)   Social: Never smoker, occasional alcohol use. 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 Has six children (age 31- 15 years)  Family History: Maternal Uncle with hx of bladder cancer    6/12/24: Patient is more fatigued than normal, says he that he normally goes to bed pretty early and is finding he wants to go to bed even earlier. He does note that since stopping  orgovyx and starting lupron and stephanie/pred the fatigue has actually improved. He was exhausted when taking orgovyx. He is having hot flashes throughout the day, says they are tolerable. Feels his metabolism may have slowed a bit, however has also been going to a lot of events (graduation parties etc) at which he is eating outside of his normal diet. He reports nocturia x 4, increased from 2-3. Endorses decreased libido, does find he is able to have erection with cialis. Has h/o cervical stenosis/fusion and had C2-C6 nerve block yesterday.    7/3/24: LFTs 6/28 gr II elevation, abiraterone held on 7/1, continued on prednisone. Overall patient has been feeling well, he has been motivated to exercise more due to the fact that he is on ADT and wants to counteract increased calorie intake / easier weight gain. He is doing his best to push through fatigue at certain times, but also takes a naps. He continues to follow with pain management and is planned for cervical nerve ablation. Ongoing vasomotor symptoms, says that a few nights ago he was up every hour with hot flashes, this is not the norm, discussed potential gabapentin, however patient has been on this medication previously and does not wish to start right now. Notes some urinary urgency and nocturia 2-4. Endorse little to no libido, does not think he has gotten spontaneous erections.    7/24/24 reports moderate fatigue, hot flash and sweating 10 times, loss of energy. Has normal urination.   8/16/24 pt is in general well-being. Appetite is good. maintaining weight. Exercise 1-2 times a day [work-out]. Continues to endorse moderate fatigue and drinks coffee which helps keep him active and about for about 1 hour. Hot flashes is a bit less but more often. No swelling or joint pain which he follows locally for management.  9/6/24 pt is doing well. Appetite is good and maintaining weight. BM is normal. Continues to exercise 1-2 times daily. Continues to endorse moderate fatigue and mild hot flash with occ sleep interruption.   10/14/24 reports mod fatigue, fluctuating hot flash and sweating  11/5/24 - continues on abiraterone 750mg daily. Overall feeling fatigued, depressed, gaining weight. Uncle passed away, another family member in the ICU. Having episodes of crying and feeling depressed, no SI. Ongoing hot flashes. Urine flow is "fine", no urgency, nocturia 3-5x/night. Occasional BLE edema. Had the spacer placed for RT, more discomfort. Denies fever, chills, night sweats, headache, dizziness, chest pain, palpitations, SOB, cough, nausea/vomiting, diarrhea/constipation, abdominal pain, dysuria, hematuria, incontinence, bleeding, muscle or joint pain/weakness. [de-identified] : G7(4+3) disease in intraductal component, 3/8 sampled sites  [de-identified] : 11/26/24 - abiraterone discontinued early Nov 2024 d/t grade 4 transaminitis. Main issue is perineal pain since spacer gel placement. He saw Dr. Angel yesterday and started Augmentin for suspected infection. MRI done today shows an abscess between the prostate and rectum. No actual fevers but has been taking Motrin ATC with subjective fevers and shaking chills at times. Denies chest pain, palpitations, SOB, cough, nausea/vomiting, diarrhea/constipation, abdominal pain.   12/20/24 14/28 fractions on 12/16 for RT, reports mild fatigue, moderate hot flash and sweating. Nocturia 3 to 4.Completed augmentin yesterday about 3 week course for his perirectal abscess.

## 2024-12-20 NOTE — ASSESSMENT
[Future Reassessment of Pain Scale] : Future reassessment of pain scale    [Medication(s)] : Medication(s) [FreeTextEntry1] : 60 year old male with dileep 4+3 prostate adenocarcinoma with intraductal and cribriform features, PSA 6.23  in Feb 2024, PSMA PET positive nodes PSMA PET 5/14/24 showed 0.6x0.3cm left para-aortic/common iliac node SUV 7.0, a 5mm left common iliac node SUV 5.8, and a 0.6x0.6cm left external iliac node SUV 10.1. He has at least stage FARIDA prostate adenocarcinoma (up to [place] 7 (4+3). PSMA PET 5/14/24 showed several SUV positive sub-centimeter pelvic lymph nodes. This indicates very high risk feature for his newly diagnosed prostate cancer. The plan is to proceed with radiation and anti- hormone therapy. The recommended treatment approach in this setting (node positive disease) based on the STAMPEDE trial is adding 2 year abiraterone (+prednisone) to ADT plus RT as an option for patients with very high risk prostate cancer. Patient had been started on Orgovyx on 5/20/24, he continued for two weeks and then received first dose of Lupron on 6/5/24. He started abiraterone and prednisone in June 2024.  Recommend genetic testing given he has node positive disease. Based on his FRAX score 10 years of probability of hip fracture 0.5%, a 10-year probability of a major osteoporosis-related fracture 6%. He does not require DEXA scan and bisphosphate vs prolia.   Plan: Very high risk of prostate cancer with pelvic nodes - Initial PSA was 7.96 on 5/20/24, declined to 0.14 on 11/11/24. PSA was 0.24 on 11/23/24, overall stable but possibly slightly more elevated in the setting of radiation and abscess as below.  - Continue Lupron [id number], last given 9/6/24, next scheduled for 12/4/24.  - Per d/w Dr. Rice, given ALT >20x ULN, will permanently discontinue abiraterone. He will continue on ADT alone at this time.  - RT to the prostate and pelvic LNs x 28 fx started 11/15/24, on hold d/t perirectal abscess, restarted on 12/9, 14/28 fx on 12/16  Kathernie rectal abscess: - mild discomfort around the rectal area. COmpleted augmentin. MRI of the pelvis done today 11/26 shows a collection measuring 3.4 x 3.1 x 5.0 cm between the rectum and prostate consistent with an abscess (infection associated with the spacer gel). - Pt has subjective fevers (taking Motrin around the clock) and chills. Dr. Angel was present for a portion of this visit, pt referred to [facility] ED for abscess drainage and IV abx. I have emailed the inpatient oncology team and [facility] ED made aware via Teams.   Transaminitis -  Reviewed 8/31/24 LFT with pt/spouse noting AST-59, ALT - 94, T.B - 1.4; will continue to monitor < GR 1 - 11/4/24 AST = 467, ALT = 1,0006 - grade 4 - 11/23/24 AST = 28, ALT = 56 - Abiraterone discontinued permanently.  F/u in 6wks

## 2024-12-20 NOTE — ASSESSMENT
[Future Reassessment of Pain Scale] : Future reassessment of pain scale    [Medication(s)] : Medication(s) [FreeTextEntry1] : 60 year old male with dileep 4+3 prostate adenocarcinoma with intraductal and cribriform features, PSA 6.23  in Feb 2024, PSMA PET positive nodes PSMA PET 5/14/24 showed 0.6x0.3cm left para-aortic/common iliac node SUV 7.0, a 5mm left common iliac node SUV 5.8, and a 0.6x0.6cm left external iliac node SUV 10.1. He has at least stage FARIDA prostate adenocarcinoma (up to [place] 7 (4+3). PSMA PET 5/14/24 showed several SUV positive sub-centimeter pelvic lymph nodes. This indicates very high risk feature for his newly diagnosed prostate cancer. The plan is to proceed with radiation and anti- hormone therapy. The recommended treatment approach in this setting (node positive disease) based on the STAMPEDE trial is adding 2 year abiraterone (+prednisone) to ADT plus RT as an option for patients with very high risk prostate cancer. Patient had been started on Orgovyx on 5/20/24, he continued for two weeks and then received first dose of Lupron on 6/5/24. He started abiraterone and prednisone in June 2024.  Recommend genetic testing given he has node positive disease. Based on his FRAX score 10 years of probability of hip fracture 0.5%, a 10-year probability of a major osteoporosis-related fracture 6%. He does not require DEXA scan and bisphosphate vs prolia.   Plan: Very high risk of prostate cancer with pelvic nodes - Initial PSA was 7.96 on 5/20/24, declined to 0.14 on 11/11/24. PSA was 0.24 on 11/23/24, overall stable but possibly slightly more elevated in the setting of radiation and abscess as below.  - Continue Lupron [id number], last given 9/6/24, next scheduled for 12/4/24.  - Per d/w Dr. Rice, given ALT >20x ULN, will permanently discontinue abiraterone. He will continue on ADT alone at this time.  - RT to the prostate and pelvic LNs x 28 fx started 11/15/24, on hold d/t perirectal abscess, restarted on 12/9, 14/28 fx on 12/16  Katherine rectal abscess: - mild discomfort around the rectal area. COmpleted augmentin. MRI of the pelvis done today 11/26 shows a collection measuring 3.4 x 3.1 x 5.0 cm between the rectum and prostate consistent with an abscess (infection associated with the spacer gel). - Pt has subjective fevers (taking Motrin around the clock) and chills. Dr. Angel was present for a portion of this visit, pt referred to [facility] ED for abscess drainage and IV abx. I have emailed the inpatient oncology team and [facility] ED made aware via Teams.   Transaminitis -  Reviewed 8/31/24 LFT with pt/spouse noting AST-59, ALT - 94, T.B - 1.4; will continue to monitor < GR 1 - 11/4/24 AST = 467, ALT = 1,0006 - grade 4 - 11/23/24 AST = 28, ALT = 56 - Abiraterone discontinued permanently.  F/u in 6wks

## 2024-12-20 NOTE — PHYSICAL EXAM
[Restricted in physically strenuous activity but ambulatory and able to carry out work of a light or sedentary nature] : Status 1- Restricted in physically strenuous activity but ambulatory and able to carry out work of a light or sedentary nature, e.g., light house work, office work [Normal] : affect appropriate [de-identified] : anicteric

## 2024-12-20 NOTE — HISTORY OF PRESENT ILLNESS
[Disease: _____________________] : Disease: [unfilled] [T: ___] : T[unfilled] [N: ___] : N[unfilled] [AJCC Stage: ____] : AJCC Stage: [unfilled] [de-identified] : 59 year old male who is presenting for initial medical oncology evaluation for prostate adenocarcinoma. He noted a significant rise in his PSA to 6.23 on 2/6/24 (previous 3.71 on 1/20/23 and 1.11 on 4/1/16). MRI 4/12/24 showed 4.4 x 2.9 x 4.1 cm = 27 cc gland, with dominant lesions in the left posterior medial-lateral (PZpm-pl) base-to-midgland peripheral zone (1.6 cm, MN-5), with concern for capsular involvement but no clear EPE, abutting the left NVB and SV without clear invasion, and left posterior medial (PZpm) [id number] peripheral zone lesion extending to the midline posterior urethra at the apex (0.9 cm, MN-4). No LAD. No major anatomic issues. Prostate biopsy 4/23/24 showed right posterior lateral base G 3+4 with 50% involvement, MRI target#1 left PZ G 4+3 among 3 cores with 60%, 55% and 55% involvement with intraductal component, MRI #2 target left PZ G 3+4 among 2 cores with 85% and 55% involvement with intraductal and cribriform component. (small prostate so limited number of sites per Dr. Umaña). PSMA PET 5/14/24 showed increased radiotracer activity in the left side of the prostate gland suggests intermediate PSA expression, with compatible metastatic disease indicated by 0.6x0.3cm left para-aortic/common iliac node SUV 7.0, a 5mm left common iliac node SUV 5.8, a 0.6x0.6cm left external iliac node SUV 10.1, PSA 7.96 and Testosterone on 272 5/20/24. He was started on Orgovyx by urology on 5/21/24   Interval History: Overall he has been doing well. He denies any issues with urination. No constipation or diarrhea. He reports that he is great. He remains active and goes to the gym 6 days per week, as well as, golf a couple days.    PMH/PSH: Appendectomy, hernia repair, testicular torsion repair, vasectomy, cervical spinal fusion; HTN (Edarbi, Amlodopine) /HLD (atorvastin)   Social: Never smoker, occasional alcohol use. 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 Has six children (age 31- 15 years)  Family History: Maternal Uncle with hx of bladder cancer    6/12/24: Patient is more fatigued than normal, says he that he normally goes to bed pretty early and is finding he wants to go to bed even earlier. He does note that since stopping  orgovyx and starting lupron and stephanie/pred the fatigue has actually improved. He was exhausted when taking orgovyx. He is having hot flashes throughout the day, says they are tolerable. Feels his metabolism may have slowed a bit, however has also been going to a lot of events (graduation parties etc) at which he is eating outside of his normal diet. He reports nocturia x 4, increased from 2-3. Endorses decreased libido, does find he is able to have erection with cialis. Has h/o cervical stenosis/fusion and had C2-C6 nerve block yesterday.    7/3/24: LFTs 6/28 gr II elevation, abiraterone held on 7/1, continued on prednisone. Overall patient has been feeling well, he has been motivated to exercise more due to the fact that he is on ADT and wants to counteract increased calorie intake / easier weight gain. He is doing his best to push through fatigue at certain times, but also takes a naps. He continues to follow with pain management and is planned for cervical nerve ablation. Ongoing vasomotor symptoms, says that a few nights ago he was up every hour with hot flashes, this is not the norm, discussed potential gabapentin, however patient has been on this medication previously and does not wish to start right now. Notes some urinary urgency and nocturia 2-4. Endorse little to no libido, does not think he has gotten spontaneous erections.    7/24/24 reports moderate fatigue, hot flash and sweating 10 times, loss of energy. Has normal urination.   8/16/24 pt is in general well-being. Appetite is good. maintaining weight. Exercise 1-2 times a day [work-out]. Continues to endorse moderate fatigue and drinks coffee which helps keep him active and about for about 1 hour. Hot flashes is a bit less but more often. No swelling or joint pain which he follows locally for management.  9/6/24 pt is doing well. Appetite is good and maintaining weight. BM is normal. Continues to exercise 1-2 times daily. Continues to endorse moderate fatigue and mild hot flash with occ sleep interruption.   10/14/24 reports mod fatigue, fluctuating hot flash and sweating  11/5/24 - continues on abiraterone 750mg daily. Overall feeling fatigued, depressed, gaining weight. Uncle passed away, another family member in the ICU. Having episodes of crying and feeling depressed, no SI. Ongoing hot flashes. Urine flow is "fine", no urgency, nocturia 3-5x/night. Occasional BLE edema. Had the spacer placed for RT, more discomfort. Denies fever, chills, night sweats, headache, dizziness, chest pain, palpitations, SOB, cough, nausea/vomiting, diarrhea/constipation, abdominal pain, dysuria, hematuria, incontinence, bleeding, muscle or joint pain/weakness. [de-identified] : G7(4+3) disease in intraductal component, 3/8 sampled sites  [de-identified] : 11/26/24 - abiraterone discontinued early Nov 2024 d/t grade 4 transaminitis. Main issue is perineal pain since spacer gel placement. He saw Dr. Angel yesterday and started Augmentin for suspected infection. MRI done today shows an abscess between the prostate and rectum. No actual fevers but has been taking Motrin ATC with subjective fevers and shaking chills at times. Denies chest pain, palpitations, SOB, cough, nausea/vomiting, diarrhea/constipation, abdominal pain.   12/20/24 14/28 fractions on 12/16 for RT, reports mild fatigue, moderate hot flash and sweating. Nocturia 3 to 4.Completed augmentin yesterday about 3 week course for his perirectal abscess.

## 2024-12-20 NOTE — PHYSICAL EXAM
[Restricted in physically strenuous activity but ambulatory and able to carry out work of a light or sedentary nature] : Status 1- Restricted in physically strenuous activity but ambulatory and able to carry out work of a light or sedentary nature, e.g., light house work, office work [Normal] : affect appropriate [de-identified] : anicteric

## 2024-12-23 NOTE — HISTORY OF PRESENT ILLNESS
[FreeTextEntry1] : Mr. Maynard is a 60-year-old male who presents for an on-treatment visit.  He is unaccompanied for today's visit.    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 Anjelica score 4+3=7 in 1 site/3 cores, Carcinoma is seen involving adipose tissue, consistent with extraprostatic extension. Intraductal carcinoma of the prostate also present. Perineural invasion present. Anjelica score 3+4=7 in 2 sites/3 cores, Atypical intraductal proliferation (AIP) and Intraductal carcinoma of the prostate also present. 85% max involvement. Cribriform [place] pattern 4 is present.  PSA 6.23 ng/mL. PSMA PET with few subcentimeter retroperitoneal and left pelvic lymph nodes with increased radiotracer activity are compatible with metastatic disease.  TREATMENT: 5/21/24 - Received Orgovyx x 2 weeks  6/5/24 - started on Lupron/Eligard injections  6/2024 - 11/2024: Abiraterone/Prednisone (dose reduced due to elevated LFTs), stopped due to persistent elevated LFTs  11/16/24 - started on RADIATION Therapy to Prostate/SV/Nodes, 7000 cGy in 28 fx   PSA Trend: 1/20/23 - 3.71 ng/mL 2/6/24 - 6.23 5/20/24 - 7.96           Testosterone 272.0 ng/dL  6/30/24 - 1.67 7/17/24 - 1.15 8/9/24 - 0.64 8/31/24 - 0.39           Testosterone < 2.5 9/19/24 - 0.32 10/12/24 - 0.22 11/4/24 - 0.19 11/11/24 - 0.14  11/18/24 - 0.21 *started radiation 11/16/24* 11/23/24 - 0.24   HPI:  Mr. Maynard originally presented to urology in March 2024 for evaluation of elevated PSA of 6.23 ng/mL. MRI ordered.   4/12/24 - Prostate MRI: Prostate volume 27 cc. PSA density: 0.23 ng/mL/mL. Lesion #1, Left, posterior medial-lateral (PZpm-pl), base-to-midgland, peripheral zone (16 x 9 x 15 mm). Tumor abuts capsule causing capsular irregularity. PIRADS 5 Lesion #2, Left, posterior medial (PZpm), [id number], peripheral zone lesion extends to the midline posterior urethra at the apex (7 x 3 x 9 mm). No extraprostatic extension noted. PIRADS 4 Lesion 1 abuts the left neurovascular bundle. Lesion 1 abuts the left seminal vesicle without definite invasion. No pelvic lymphadenopathy and no suspicious bone lesions identified.    4/23/24 - underwent Prostate Biopsy: Pathology - Adenocarcinoma of Prostate, 3/8 sites and 6/11 cores involved.  [place] score 4+3=7 in 1 site/3 cores (MRI target lesion #1), Carcinoma is seen involving adipose tissue, consistent with extraprostatic extension. Intraductal carcinoma of the prostate also present. Perineural invasion present.  Anjelica score 3+4=7 in 2 sites/3 cores (MRI target lesion and right posterior lateral base), Atypical intraductal proliferation (AIP) and Intraductal carcinoma of the prostate also present. 85% max involvement.  Note: Cribriform Anjelica pattern 4 is present.    Mr. Maynard saw Dr. Umaña (urology) in follow up and biopsy results were reviewed.  Reviewed that definitive treatment would be recommended given the biopsy results.  PSMA PET ordered. Referrals to urology surgeon and radiation oncology made.  He then was seen by Dr. Pollock (urology) and Dr. Diamond ([facility]) in multidisciplinary clinic on 5/9/24, both treatment options were reviewed in detail.  Final decision about treatment to be made after PSMA PET.   5/14/24 - PSMA PET: 1. Foci of increased radiotracer activity in left side of prostate gland, extending from apex to base, corresponding to lesions identified on MRI/biopsy-proven prostate carcinoma. The intensity of radiotracer activity suggests intermediate PSMA expression. 2. Few subcentimeter retroperitoneal and left pelvic lymph nodes with increased radiotracer activity are compatible with metastatic disease.  5/22/24 - saw Dr. Rice ([facility]) in consultation. Discussed all imaging and pathology results.  He has newly diagnosed at least stage FARIDA prostate adenocarcinoma (up to Anjelica 7 (4+3). PSMA PET 5/14/24 showed several SUV positive sub-centimeter pelvic lymph nodes. This indicates very high-risk feature for his newly diagnosed prostate cancer. The plan is to proceed with radiation and anti- hormone therapy. The recommended treatment approach in this setting (node positive disease) based on the STAMPEDE trial is adding 2-year abiraterone (+prednisone) to ADT plus RT as an option for patients with very high-risk prostate cancer.    6/2024 - started on Lupron/Eligard injections and Abiraterone/Prednisone with Dr. Rice ([facility])  7/1/24 - saw Dr. Nielsen ([facility]) in consultation as Dr. Diamond had left the practice.  Recommended external beam radiation therapy to the prostate, in addition to continuing hormone therapy, based on the STAMPEDE study findings. The radiation therapy would be given for 5 weeks (25 treatments) after observing the PSA levels on hormone therapy (typically 4-6 months). Will coordinate with the radiation oncology team at the [facility] in [place] for the patient's radiation treatment.    10/29/24 - Had Prostate Fiducial Markers/SpaceOAR Gel placed by Dr. Nielsen ([facility]) in preparation for radiation therapy.    11/5/24 - presented for follow up visit prior to SIM planning scan as he is to receive his radiation at [facility].  Mr. Maynard is feeling well today, appropriately anxious about next steps. He has nocturia of 4x (wakes due to hot flashes so urinates as well), frequency, and occasional urgency. No bowel issues, last colonoscopy was within the past year.  Poor erectile function has seen Dr. Ren. He continues to follow with Dr. Rice ([facility]) and is on Lupron injections and Abiraterone/Prednisone, as per patient told to hold Abiraterone due to elevated LFTs.  He is having hot flashes, decreased energy, and some depression since starting on Lupron injections and Abiraterone/Prednisone. He is looking forward to a trip to [place] at the end of December.  Met with wife and patient to discuss radiation option. Reviewed imaging. Pelvic lymph nodes could be covered by pelvic RT field, including all PSMA positive disease. PSA down to 0.19ng/mL. We discussed options for STAMPEDE RT vs RT to pelvis and prostate. Logisitics and possible acute and late side effects discussed. Opted for comprehensive RT to pelvis and prostate 28 fractions. Consent signed. Simulation today.  11/16/24 - started on RADIATION Therapy to Prostate/SV/Nodes, 7000 cGy in 28 fx with ADT   11/18/24 - OTV to complete 2/28 fx today.  Reinforced what to expect with radiation therapy, possible side effects, as well the importance of bladder filling/bowel emptying. Diet reviewed.    11/25/24 - OTV 8/28 fx completed.  Having rectal pain and mild chills, difficulty sitting down, looking for soft chair. Evaluate for rectal abscess with MRI, otherwise doing ok with radiation, start antibiotic, and continue RT in the meantime.  11/26/24 - MRI Pelvis: Collection measuring 3.4 x 3.1 x 5.0 cm between the rectum and prostate consistent with an abscess (infection associated with the spacer gel). Collection extends into the anterior rectal wall and fistulizes into the right seminal vesicle.  Admitted to [facility] from 11/26/24 - 11/28/24 after MRI results for IVAB.  Seen by urology, colorectal surgery (no surgical intervention recommended), and infectious disease.  Discharged home to continue on oral antibiotics.    12/9/24 - presents today after hospitalization for IVAB prior to restarting on radiation therapy.  He had completed 8/28 fx on 11/25/24.  Mr. Maynard is feeling better, denies any chills, fever. No discharge or blood from rectum.  Still with discomfort and feeling of irritation but denies pain, remains on antibiotic at this time, due to complete at the end of the week.  Has not been taking any Tylenol or Ibuprofen. No diarrhea, stool is softer and more frequent possibly due to antibiotics.  Nocturia of 2-4x and increase in frequency, No dysuria or hematuria.   Is off Abiraterone/Prednisone since beginning of November due to elevated LFTs, last saw Dr. Rice ([facility]) on 11/26/24 next follow up on 12/20/24.  Admitted for antibiotics for rectal abscess, now been off treatment for 2 weeks. Completed 8 fractions prior to starting RT. Now feeling much better. Only very mild symptoms in rectum. No fevers. Continuing on antibiotics. Discussed pros and cons of restarting RT vs. waiting longer for healing. Plan to restart today with regular review. He will continue ABx over the weekend as is tolerating well. He has my contact details to contact me at any time.  12/16/24 - OTV 14/28 fx completed. Mr. Maynard continues on treatment. He has an increase in tiredness/fatigue. Remains on antibiotics at this time, to complete on Thursday.  No rectal pain, some mild discomfort remains. No fever or chills. No blood or mucus from the rectum.  Urinary symptoms remain stable, adequate stream, no dysuria or hematuria.  Doing well, no fevers, discomfort in rectum improved. Continue RT.  12/23/24 - OTV 19/28 fx completed.

## 2024-12-23 NOTE — REVIEW OF SYSTEMS
[Constipation: Grade 0] : Constipation: Grade 0 [Diarrhea: Grade 0] : Diarrhea: Grade 0 [Proctitis: Grade 0] : Proctitis: Grade 0 [Rectal Pain: Grade 0] : Rectal Pain: Grade 0 [Fatigue: Grade 1 - Fatigue relieved by rest] : Fatigue: Grade 1 - Fatigue relieved by rest [Hematuria: Grade 0] : Hematuria: Grade 0 [Urinary Incontinence: Grade 0] : Urinary Incontinence: Grade 0  [Urinary Retention: Grade 0] : Urinary Retention: Grade 0 [Urinary Tract Pain: Grade 0] : Urinary Tract Pain: Grade 0 [Urinary Urgency: Grade 0] : Urinary Urgency: Grade 0 [Urinary Frequency: Grade 1 - Present] : Urinary Frequency: Grade 1 - Present [Erectile Dysfunction: Grade 2 - Decrease in erectile function (frequency/rigidity of erections), erectile intervention indicated, (e.g., medication or mechanical devices such as penile pump)] : Erectile Dysfunction: Grade 2 - Decrease in erectile function (frequency/rigidity of erections), erectile intervention indicated, (e.g., medication or mechanical devices such as penile pump) [Ejaculation Disorder: Grade 2 - Anejaculation or retrograde ejaculation] : Ejaculation Disorder: Grade 2 - Anejaculation or retrograde ejaculation [FreeTextEntry3] : stool is softer  [de-identified] : discomfort/irritation  [FreeTextEntry5] : occasional  [FreeTextEntry9] : nocturia 2-4x

## 2024-12-23 NOTE — HISTORY OF PRESENT ILLNESS
[FreeTextEntry1] : Mr. Maynard is a 60-year-old male who presents for an on-treatment visit.  He is unaccompanied for today's visit.    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 Anjelica score 4+3=7 in 1 site/3 cores, Carcinoma is seen involving adipose tissue, consistent with extraprostatic extension. Intraductal carcinoma of the prostate also present. Perineural invasion present. Anjelica score 3+4=7 in 2 sites/3 cores, Atypical intraductal proliferation (AIP) and Intraductal carcinoma of the prostate also present. 85% max involvement. Cribriform [place] pattern 4 is present.  PSA 6.23 ng/mL. PSMA PET with few subcentimeter retroperitoneal and left pelvic lymph nodes with increased radiotracer activity are compatible with metastatic disease.  TREATMENT: 5/21/24 - Received Orgovyx x 2 weeks  6/5/24 - started on Lupron/Eligard injections  6/2024 - 11/2024: Abiraterone/Prednisone (dose reduced due to elevated LFTs), stopped due to persistent elevated LFTs  11/16/24 - started on RADIATION Therapy to Prostate/SV/Nodes, 7000 cGy in 28 fx   PSA Trend: 1/20/23 - 3.71 ng/mL 2/6/24 - 6.23 5/20/24 - 7.96           Testosterone 272.0 ng/dL  6/30/24 - 1.67 7/17/24 - 1.15 8/9/24 - 0.64 8/31/24 - 0.39           Testosterone < 2.5 9/19/24 - 0.32 10/12/24 - 0.22 11/4/24 - 0.19 11/11/24 - 0.14  11/18/24 - 0.21 *started radiation 11/16/24* 11/23/24 - 0.24   HPI:  Mr. Maynard originally presented to urology in March 2024 for evaluation of elevated PSA of 6.23 ng/mL. MRI ordered.   4/12/24 - Prostate MRI: Prostate volume 27 cc. PSA density: 0.23 ng/mL/mL. Lesion #1, Left, posterior medial-lateral (PZpm-pl), base-to-midgland, peripheral zone (16 x 9 x 15 mm). Tumor abuts capsule causing capsular irregularity. PIRADS 5 Lesion #2, Left, posterior medial (PZpm), [id number], peripheral zone lesion extends to the midline posterior urethra at the apex (7 x 3 x 9 mm). No extraprostatic extension noted. PIRADS 4 Lesion 1 abuts the left neurovascular bundle. Lesion 1 abuts the left seminal vesicle without definite invasion. No pelvic lymphadenopathy and no suspicious bone lesions identified.    4/23/24 - underwent Prostate Biopsy: Pathology - Adenocarcinoma of Prostate, 3/8 sites and 6/11 cores involved.  [place] score 4+3=7 in 1 site/3 cores (MRI target lesion #1), Carcinoma is seen involving adipose tissue, consistent with extraprostatic extension. Intraductal carcinoma of the prostate also present. Perineural invasion present.  Anjelica score 3+4=7 in 2 sites/3 cores (MRI target lesion and right posterior lateral base), Atypical intraductal proliferation (AIP) and Intraductal carcinoma of the prostate also present. 85% max involvement.  Note: Cribriform Anjelica pattern 4 is present.    Mr. Maynard saw Dr. Umaña (urology) in follow up and biopsy results were reviewed.  Reviewed that definitive treatment would be recommended given the biopsy results.  PSMA PET ordered. Referrals to urology surgeon and radiation oncology made.  He then was seen by Dr. Pollock (urology) and Dr. Diamond ([facility]) in multidisciplinary clinic on 5/9/24, both treatment options were reviewed in detail.  Final decision about treatment to be made after PSMA PET.   5/14/24 - PSMA PET: 1. Foci of increased radiotracer activity in left side of prostate gland, extending from apex to base, corresponding to lesions identified on MRI/biopsy-proven prostate carcinoma. The intensity of radiotracer activity suggests intermediate PSMA expression. 2. Few subcentimeter retroperitoneal and left pelvic lymph nodes with increased radiotracer activity are compatible with metastatic disease.  5/22/24 - saw Dr. Rice ([facility]) in consultation. Discussed all imaging and pathology results.  He has newly diagnosed at least stage FARIDA prostate adenocarcinoma (up to Anjelica 7 (4+3). PSMA PET 5/14/24 showed several SUV positive sub-centimeter pelvic lymph nodes. This indicates very high-risk feature for his newly diagnosed prostate cancer. The plan is to proceed with radiation and anti- hormone therapy. The recommended treatment approach in this setting (node positive disease) based on the STAMPEDE trial is adding 2-year abiraterone (+prednisone) to ADT plus RT as an option for patients with very high-risk prostate cancer.    6/2024 - started on Lupron/Eligard injections and Abiraterone/Prednisone with Dr. Rice ([facility])  7/1/24 - saw Dr. Nielsen ([facility]) in consultation as Dr. Diamond had left the practice.  Recommended external beam radiation therapy to the prostate, in addition to continuing hormone therapy, based on the STAMPEDE study findings. The radiation therapy would be given for 5 weeks (25 treatments) after observing the PSA levels on hormone therapy (typically 4-6 months). Will coordinate with the radiation oncology team at the [facility] in [place] for the patient's radiation treatment.    10/29/24 - Had Prostate Fiducial Markers/SpaceOAR Gel placed by Dr. Nielsen ([facility]) in preparation for radiation therapy.    11/5/24 - presented for follow up visit prior to SIM planning scan as he is to receive his radiation at [facility].  Mr. Maynard is feeling well today, appropriately anxious about next steps. He has nocturia of 4x (wakes due to hot flashes so urinates as well), frequency, and occasional urgency. No bowel issues, last colonoscopy was within the past year.  Poor erectile function has seen Dr. Ren. He continues to follow with Dr. Rice ([facility]) and is on Lupron injections and Abiraterone/Prednisone, as per patient told to hold Abiraterone due to elevated LFTs.  He is having hot flashes, decreased energy, and some depression since starting on Lupron injections and Abiraterone/Prednisone. He is looking forward to a trip to [place] at the end of December.  Met with wife and patient to discuss radiation option. Reviewed imaging. Pelvic lymph nodes could be covered by pelvic RT field, including all PSMA positive disease. PSA down to 0.19ng/mL. We discussed options for STAMPEDE RT vs RT to pelvis and prostate. Logisitics and possible acute and late side effects discussed. Opted for comprehensive RT to pelvis and prostate 28 fractions. Consent signed. Simulation today.  11/16/24 - started on RADIATION Therapy to Prostate/SV/Nodes, 7000 cGy in 28 fx with ADT   11/18/24 - OTV to complete 2/28 fx today.  Reinforced what to expect with radiation therapy, possible side effects, as well the importance of bladder filling/bowel emptying. Diet reviewed.    11/25/24 - OTV 8/28 fx completed.  Having rectal pain and mild chills, difficulty sitting down, looking for soft chair. Evaluate for rectal abscess with MRI, otherwise doing ok with radiation, start antibiotic, and continue RT in the meantime.  11/26/24 - MRI Pelvis: Collection measuring 3.4 x 3.1 x 5.0 cm between the rectum and prostate consistent with an abscess (infection associated with the spacer gel). Collection extends into the anterior rectal wall and fistulizes into the right seminal vesicle.  Admitted to [facility] from 11/26/24 - 11/28/24 after MRI results for IVAB.  Seen by urology, colorectal surgery (no surgical intervention recommended), and infectious disease.  Discharged home to continue on oral antibiotics.    12/9/24 - presents today after hospitalization for IVAB prior to restarting on radiation therapy.  He had completed 8/28 fx on 11/25/24.  Mr. Maynard is feeling better, denies any chills, fever. No discharge or blood from rectum.  Still with discomfort and feeling of irritation but denies pain, remains on antibiotic at this time, due to complete at the end of the week.  Has not been taking any Tylenol or Ibuprofen. No diarrhea, stool is softer and more frequent possibly due to antibiotics.  Nocturia of 2-4x and increase in frequency, No dysuria or hematuria.   Is off Abiraterone/Prednisone since beginning of November due to elevated LFTs, last saw Dr. Rice ([facility]) on 11/26/24 next follow up on 12/20/24.  Admitted for antibiotics for rectal abscess, now been off treatment for 2 weeks. Completed 8 fractions prior to starting RT. Now feeling much better. Only very mild symptoms in rectum. No fevers. Continuing on antibiotics. Discussed pros and cons of restarting RT vs. waiting longer for healing. Plan to restart today with regular review. He will continue ABx over the weekend as is tolerating well. He has my contact details to contact me at any time.  12/16/24 - OTV 14/28 fx completed. Mr. Maynard continues on treatment. He has an increase in tiredness/fatigue. Remains on antibiotics at this time, to complete on Thursday.  No rectal pain, some mild discomfort remains. No fever or chills. No blood or mucus from the rectum.  Urinary symptoms remain stable, adequate stream, no dysuria or hematuria.  Doing well, no fevers, discomfort in rectum improved. Continue RT.  12/23/24 - OTV 19/28 fx completed.

## 2024-12-23 NOTE — DISEASE MANAGEMENT
[0-10] : 0 -10 ng/mL [Biopsy with Fusion] : Patient had a biopsy with fusion on [7(3+4)] : Template Biopsy [place] Score: 7(3+4) [7(4+3)] : Fusion Biopsy [place] Score: 7(4+3) [] : Patient had a Prostate MRI [5] : 5 [Pathological] : TNM Stage: p [FARIDA] : FARIDA [Radiation Therapy] : Radiation Therapy [Androgen Ablation] : Androgen Ablation [Treatment with androgen ablation] : Treatment with androgen ablation [BiopsyDate] : 04/23/2024 [MeasuredProstateVolume] : 27 [TotalCores] : 11 [TotalPositiveCores] : 6 [MaxCoreInvolvement] : 85 [TTNM] : 3a [NTNM] : 1 [MTNM] : 0 [de-identified] : [id number] [de-identified] : 7000cGy [de-identified] : Prostate/SV/Nodes with ADT  [ADTStartedDate] : 6/2024

## 2024-12-23 NOTE — DISEASE MANAGEMENT
[0-10] : 0 -10 ng/mL [Biopsy with Fusion] : Patient had a biopsy with fusion on [7(3+4)] : Template Biopsy [place] Score: 7(3+4) [7(4+3)] : Fusion Biopsy [place] Score: 7(4+3) [] : Patient had a Prostate MRI [5] : 5 [Pathological] : TNM Stage: p [FARIDA] : FARIDA [Radiation Therapy] : Radiation Therapy [Androgen Ablation] : Androgen Ablation [Treatment with androgen ablation] : Treatment with androgen ablation [BiopsyDate] : 04/23/2024 [MeasuredProstateVolume] : 27 [TotalCores] : 11 [TotalPositiveCores] : 6 [MaxCoreInvolvement] : 85 [TTNM] : 3a [NTNM] : 1 [MTNM] : 0 [de-identified] : [id number] [de-identified] : 7000cGy [de-identified] : Prostate/SV/Nodes with ADT  [ADTStartedDate] : 6/2024

## 2024-12-30 NOTE — HISTORY OF PRESENT ILLNESS
[FreeTextEntry1] : Mr. Maynard is a 60-year-old male who presents for an on-treatment visit.  He is unaccompanied for today's visit.    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 Anjelica score 4+3=7 in 1 site/3 cores, Carcinoma is seen involving adipose tissue, consistent with extraprostatic extension. Intraductal carcinoma of the prostate also present. Perineural invasion present. Anjelica score 3+4=7 in 2 sites/3 cores, Atypical intraductal proliferation (AIP) and Intraductal carcinoma of the prostate also present. 85% max involvement. Cribriform Munising pattern 4 is present.  PSA 6.23 ng/mL. PSMA PET with few subcentimeter retroperitoneal and left pelvic lymph nodes with increased radiotracer activity are compatible with metastatic disease.  TREATMENT: 5/21/24 - Received Orgovyx x 2 weeks  6/5/24 - started on Lupron/Eligard injections  6/2024 - 11/2024: Abiraterone/Prednisone (dose reduced due to elevated LFTs), stopped due to persistent elevated LFTs  11/16/24 - started on RADIATION Therapy to Prostate/SV/Nodes, 7000 cGy in 28 fx   PSA Trend: 1/20/23 - 3.71 ng/mL 2/6/24 - 6.23 5/20/24 - 7.96           Testosterone 272.0 ng/dL  6/30/24 - 1.67 7/17/24 - 1.15 8/9/24 - 0.64 8/31/24 - 0.39           Testosterone < 2.5 9/19/24 - 0.32 10/12/24 - 0.22 11/4/24 - 0.19 11/11/24 - 0.14  11/18/24 - 0.21 *started radiation 11/16/24* 11/23/24 - 0.24  12/19/24 - 0.10  HPI:  Mr. Maynard originally presented to urology in March 2024 for evaluation of elevated PSA of 6.23 ng/mL. MRI ordered.   4/12/24 - Prostate MRI: Prostate volume 27 cc. PSA density: 0.23 ng/mL/mL. Lesion #1, Left, posterior medial-lateral (PZpm-pl), base-to-midgland, peripheral zone (16 x 9 x 15 mm). Tumor abuts capsule causing capsular irregularity. PIRADS 5 Lesion #2, Left, posterior medial (PZpm), [id number], peripheral zone lesion extends to the midline posterior urethra at the apex (7 x 3 x 9 mm). No extraprostatic extension noted. PIRADS 4 Lesion 1 abuts the left neurovascular bundle. Lesion 1 abuts the left seminal vesicle without definite invasion. No pelvic lymphadenopathy and no suspicious bone lesions identified.    4/23/24 - underwent Prostate Biopsy: Pathology - Adenocarcinoma of Prostate, 3/8 sites and 6/11 cores involved.  Anjelica score 4+3=7 in 1 site/3 cores (MRI target lesion #1), Carcinoma is seen involving adipose tissue, consistent with extraprostatic extension. Intraductal carcinoma of the prostate also present. Perineural invasion present.  Anjelica score 3+4=7 in 2 sites/3 cores (MRI target lesion and right posterior lateral base), Atypical intraductal proliferation (AIP) and Intraductal carcinoma of the prostate also present. 85% max involvement.  Note: Cribriform Munising pattern 4 is present.    Mr. Maynard saw Dr. mUaña (urology) in follow up and biopsy results were reviewed.  Reviewed that definitive treatment would be recommended given the biopsy results.  PSMA PET ordered. Referrals to urology surgeon and radiation oncology made.  He then was seen by Dr. Pollock (urology) and Dr. Diamond ([facility]) in multidisciplinary clinic on 5/9/24, both treatment options were reviewed in detail.  Final decision about treatment to be made after PSMA PET.   5/14/24 - PSMA PET: 1. Foci of increased radiotracer activity in left side of prostate gland, extending from apex to base, corresponding to lesions identified on MRI/biopsy-proven prostate carcinoma. The intensity of radiotracer activity suggests intermediate PSMA expression. 2. Few subcentimeter retroperitoneal and left pelvic lymph nodes with increased radiotracer activity are compatible with metastatic disease.  5/22/24 - saw Dr. Rice ([facility]) in consultation. Discussed all imaging and pathology results.  He has newly diagnosed at least stage FARIDA prostate adenocarcinoma (up to Munising 7 (4+3). PSMA PET 5/14/24 showed several SUV positive sub-centimeter pelvic lymph nodes. This indicates very high-risk feature for his newly diagnosed prostate cancer. The plan is to proceed with radiation and anti- hormone therapy. The recommended treatment approach in this setting (node positive disease) based on the STAMPEDE trial is adding 2-year abiraterone (+prednisone) to ADT plus RT as an option for patients with very high-risk prostate cancer.    6/2024 - started on Lupron/Eligard injections and Abiraterone/Prednisone with Dr. Rice ([facility])  7/1/24 - saw Dr. Nielsen ([facility]) in consultation as Dr. Diamond had left the practice.  Recommended external beam radiation therapy to the prostate, in addition to continuing hormone therapy, based on the STAMPEDE study findings. The radiation therapy would be given for 5 weeks (25 treatments) after observing the PSA levels on hormone therapy (typically 4-6 months). Will coordinate with the radiation oncology team at the [facility] in [place] for the patient's radiation treatment.    10/29/24 - Had Prostate Fiducial Markers/SpaceOAR Gel placed by Dr. Nielsen ([facility]) in preparation for radiation therapy.    11/5/24 - presented for follow up visit prior to SIM planning scan as he is to receive his radiation at [facility].  Mr. Maynard is feeling well today, appropriately anxious about next steps. He has nocturia of 4x (wakes due to hot flashes so urinates as well), frequency, and occasional urgency. No bowel issues, last colonoscopy was within the past year.  Poor erectile function has seen Dr. Ren. He continues to follow with Dr. Rice ([facility]) and is on Lupron injections and Abiraterone/Prednisone, as per patient told to hold Abiraterone due to elevated LFTs.  He is having hot flashes, decreased energy, and some depression since starting on Lupron injections and Abiraterone/Prednisone. He is looking forward to a trip to [place] at the end of December.  Met with wife and patient to discuss radiation option. Reviewed imaging. Pelvic lymph nodes could be covered by pelvic RT field, including all PSMA positive disease. PSA down to 0.19ng/mL. We discussed options for STAMPEDE RT vs RT to pelvis and prostate. Logisitics and possible acute and late side effects discussed. Opted for comprehensive RT to pelvis and prostate 28 fractions. Consent signed. Simulation today.  11/16/24 - started on RADIATION Therapy to Prostate/SV/Nodes, 7000 cGy in 28 fx with ADT   11/18/24 - OTV to complete 2/28 fx today.  Reinforced what to expect with radiation therapy, possible side effects, as well the importance of bladder filling/bowel emptying. Diet reviewed.    11/25/24 - OTV 8/28 fx completed.  Having rectal pain and mild chills, difficulty sitting down, looking for soft chair. Evaluate for rectal abscess with MRI, otherwise doing ok with radiation, start antibiotic, and continue RT in the meantime.  11/26/24 - MRI Pelvis: Collection measuring 3.4 x 3.1 x 5.0 cm between the rectum and prostate consistent with an abscess (infection associated with the spacer gel). Collection extends into the anterior rectal wall and fistulizes into the right seminal vesicle.  Admitted to [facility] from 11/26/24 - 11/28/24 after MRI results for IVAB.  Seen by urology, colorectal surgery (no surgical intervention recommended), and infectious disease.  Discharged home to continue on oral antibiotics.    12/9/24 - presents today after hospitalization for IVAB prior to restarting on radiation therapy.  He had completed 8/28 fx on 11/25/24.  Mr. Maynard is feeling better, denies any chills, fever. No discharge or blood from rectum.  Still with discomfort and feeling of irritation but denies pain, remains on antibiotic at this time, due to complete at the end of the week.  Has not been taking any Tylenol or Ibuprofen. No diarrhea, stool is softer and more frequent possibly due to antibiotics.  Nocturia of 2-4x and increase in frequency, No dysuria or hematuria.   Is off Abiraterone/Prednisone since beginning of November due to elevated LFTs, last saw Dr. Rice ([facility]) on 11/26/24 next follow up on 12/20/24.  Admitted for antibiotics for rectal abscess, now been off treatment for 2 weeks. Completed 8 fractions prior to starting RT. Now feeling much better. Only very mild symptoms in rectum. No fevers. Continuing on antibiotics. Discussed pros and cons of restarting RT vs. waiting longer for healing. Plan to restart today with regular review. He will continue ABx over the weekend as is tolerating well. He has my contact details to contact me at any time.  12/16/24 - OTV 14/28 fx completed. Mr. Maynard continues on treatment. He has an increase in tiredness/fatigue. Remains on antibiotics at this time, to complete on Thursday.  No rectal pain, some mild discomfort remains. No fever or chills. No blood or mucus from the rectum.  Urinary symptoms remain stable, adequate stream, no dysuria or hematuria.  Doing well, no fevers, discomfort in rectum improved. Continue RT.  12/23/24 - OTV 19/28 fx completed. Some continuing rectal discomfort. No worse than last week. No significant urinary symptoms. Off antibiotics now. Completed antibiotic on 12/19/24.  Advised to monitor rectal symptoms and temperature closely and let us know if any change. Continue RT. Review next week. Would like to take Friday's treatment off. Continues to follow with Dr. Rice ([facility]) last seen 12/20/24, remains on ADT alone, Abiraterone discontinued permanently.   12/30/24 - OTV 22/28 fx completed.

## 2024-12-30 NOTE — DISEASE MANAGEMENT
[0-10] : 0 -10 ng/mL [Biopsy with Fusion] : Patient had a biopsy with fusion on [7(3+4)] : Template Biopsy [place] Score: 7(3+4) [7(4+3)] : Fusion Biopsy [place] Score: 7(4+3) [] : Patient had a Prostate MRI [5] : 5 [Pathological] : TNM Stage: p [FARIDA] : FARIDA [Radiation Therapy] : Radiation Therapy [Androgen Ablation] : Androgen Ablation [Treatment with androgen ablation] : Treatment with androgen ablation [BiopsyDate] : 04/23/2024 [MeasuredProstateVolume] : 27 [TotalCores] : 11 [TotalPositiveCores] : 6 [MaxCoreInvolvement] : 85 [TTNM] : 3a [NTNM] : 1 [MTNM] : 0 [de-identified] : 2270cGy [de-identified] : 7000cGy [de-identified] : Prostate/SV/Nodes with ADT  [ADTStartedDate] : 6/2024

## 2024-12-30 NOTE — REVIEW OF SYSTEMS
[Constipation: Grade 0] : Constipation: Grade 0 [Diarrhea: Grade 0] : Diarrhea: Grade 0 [Proctitis: Grade 0] : Proctitis: Grade 0 [Rectal Pain: Grade 0] : Rectal Pain: Grade 0 [Fatigue: Grade 1 - Fatigue relieved by rest] : Fatigue: Grade 1 - Fatigue relieved by rest [Hematuria: Grade 0] : Hematuria: Grade 0 [Urinary Incontinence: Grade 0] : Urinary Incontinence: Grade 0  [Urinary Retention: Grade 0] : Urinary Retention: Grade 0 [Urinary Tract Pain: Grade 0] : Urinary Tract Pain: Grade 0 [Urinary Urgency: Grade 0] : Urinary Urgency: Grade 0 [Urinary Frequency: Grade 1 - Present] : Urinary Frequency: Grade 1 - Present [Erectile Dysfunction: Grade 2 - Decrease in erectile function (frequency/rigidity of erections), erectile intervention indicated, (e.g., medication or mechanical devices such as penile pump)] : Erectile Dysfunction: Grade 2 - Decrease in erectile function (frequency/rigidity of erections), erectile intervention indicated, (e.g., medication or mechanical devices such as penile pump) [Ejaculation Disorder: Grade 2 - Anejaculation or retrograde ejaculation] : Ejaculation Disorder: Grade 2 - Anejaculation or retrograde ejaculation [FreeTextEntry3] : stool is softer  [de-identified] : discomfort/irritation  [FreeTextEntry5] : occasional  [FreeTextEntry9] : nocturia 2-4x

## 2024-12-30 NOTE — DISEASE MANAGEMENT
[0-10] : 0 -10 ng/mL [Biopsy with Fusion] : Patient had a biopsy with fusion on [7(3+4)] : Template Biopsy [place] Score: 7(3+4) [7(4+3)] : Fusion Biopsy [place] Score: 7(4+3) [] : Patient had a Prostate MRI [5] : 5 [Pathological] : TNM Stage: p [FARIDA] : FARIDA [Radiation Therapy] : Radiation Therapy [Androgen Ablation] : Androgen Ablation [Treatment with androgen ablation] : Treatment with androgen ablation [BiopsyDate] : 04/23/2024 [MeasuredProstateVolume] : 27 [TotalCores] : 11 [TotalPositiveCores] : 6 [MaxCoreInvolvement] : 85 [TTNM] : 3a [NTNM] : 1 [MTNM] : 0 [de-identified] : 9110cGy [de-identified] : 7000cGy [de-identified] : Prostate/SV/Nodes with ADT  [ADTStartedDate] : 6/2024

## 2024-12-30 NOTE — HISTORY OF PRESENT ILLNESS
[FreeTextEntry1] : Mr. Maynard is a 60-year-old male who presents for an on-treatment visit.  He is unaccompanied for today's visit.    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 Anjelica score 4+3=7 in 1 site/3 cores, Carcinoma is seen involving adipose tissue, consistent with extraprostatic extension. Intraductal carcinoma of the prostate also present. Perineural invasion present. Anjelica score 3+4=7 in 2 sites/3 cores, Atypical intraductal proliferation (AIP) and Intraductal carcinoma of the prostate also present. 85% max involvement. Cribriform Floral pattern 4 is present.  PSA 6.23 ng/mL. PSMA PET with few subcentimeter retroperitoneal and left pelvic lymph nodes with increased radiotracer activity are compatible with metastatic disease.  TREATMENT: 5/21/24 - Received Orgovyx x 2 weeks  6/5/24 - started on Lupron/Eligard injections  6/2024 - 11/2024: Abiraterone/Prednisone (dose reduced due to elevated LFTs), stopped due to persistent elevated LFTs  11/16/24 - started on RADIATION Therapy to Prostate/SV/Nodes, 7000 cGy in 28 fx   PSA Trend: 1/20/23 - 3.71 ng/mL 2/6/24 - 6.23 5/20/24 - 7.96           Testosterone 272.0 ng/dL  6/30/24 - 1.67 7/17/24 - 1.15 8/9/24 - 0.64 8/31/24 - 0.39           Testosterone < 2.5 9/19/24 - 0.32 10/12/24 - 0.22 11/4/24 - 0.19 11/11/24 - 0.14  11/18/24 - 0.21 *started radiation 11/16/24* 11/23/24 - 0.24  12/19/24 - 0.10  HPI:  Mr. Maynard originally presented to urology in March 2024 for evaluation of elevated PSA of 6.23 ng/mL. MRI ordered.   4/12/24 - Prostate MRI: Prostate volume 27 cc. PSA density: 0.23 ng/mL/mL. Lesion #1, Left, posterior medial-lateral (PZpm-pl), base-to-midgland, peripheral zone (16 x 9 x 15 mm). Tumor abuts capsule causing capsular irregularity. PIRADS 5 Lesion #2, Left, posterior medial (PZpm), [id number], peripheral zone lesion extends to the midline posterior urethra at the apex (7 x 3 x 9 mm). No extraprostatic extension noted. PIRADS 4 Lesion 1 abuts the left neurovascular bundle. Lesion 1 abuts the left seminal vesicle without definite invasion. No pelvic lymphadenopathy and no suspicious bone lesions identified.    4/23/24 - underwent Prostate Biopsy: Pathology - Adenocarcinoma of Prostate, 3/8 sites and 6/11 cores involved.  Anjelica score 4+3=7 in 1 site/3 cores (MRI target lesion #1), Carcinoma is seen involving adipose tissue, consistent with extraprostatic extension. Intraductal carcinoma of the prostate also present. Perineural invasion present.  Anjelica score 3+4=7 in 2 sites/3 cores (MRI target lesion and right posterior lateral base), Atypical intraductal proliferation (AIP) and Intraductal carcinoma of the prostate also present. 85% max involvement.  Note: Cribriform Floral pattern 4 is present.    Mr. Maynard saw Dr. Umaña (urology) in follow up and biopsy results were reviewed.  Reviewed that definitive treatment would be recommended given the biopsy results.  PSMA PET ordered. Referrals to urology surgeon and radiation oncology made.  He then was seen by Dr. Pollock (urology) and Dr. Diamond ([facility]) in multidisciplinary clinic on 5/9/24, both treatment options were reviewed in detail.  Final decision about treatment to be made after PSMA PET.   5/14/24 - PSMA PET: 1. Foci of increased radiotracer activity in left side of prostate gland, extending from apex to base, corresponding to lesions identified on MRI/biopsy-proven prostate carcinoma. The intensity of radiotracer activity suggests intermediate PSMA expression. 2. Few subcentimeter retroperitoneal and left pelvic lymph nodes with increased radiotracer activity are compatible with metastatic disease.  5/22/24 - saw Dr. Rice ([facility]) in consultation. Discussed all imaging and pathology results.  He has newly diagnosed at least stage FARIDA prostate adenocarcinoma (up to Floral 7 (4+3). PSMA PET 5/14/24 showed several SUV positive sub-centimeter pelvic lymph nodes. This indicates very high-risk feature for his newly diagnosed prostate cancer. The plan is to proceed with radiation and anti- hormone therapy. The recommended treatment approach in this setting (node positive disease) based on the STAMPEDE trial is adding 2-year abiraterone (+prednisone) to ADT plus RT as an option for patients with very high-risk prostate cancer.    6/2024 - started on Lupron/Eligard injections and Abiraterone/Prednisone with Dr. Rice ([facility])  7/1/24 - saw Dr. Nielsen ([facility]) in consultation as Dr. Diamond had left the practice.  Recommended external beam radiation therapy to the prostate, in addition to continuing hormone therapy, based on the STAMPEDE study findings. The radiation therapy would be given for 5 weeks (25 treatments) after observing the PSA levels on hormone therapy (typically 4-6 months). Will coordinate with the radiation oncology team at the [facility] in [place] for the patient's radiation treatment.    10/29/24 - Had Prostate Fiducial Markers/SpaceOAR Gel placed by Dr. Nielsen ([facility]) in preparation for radiation therapy.    11/5/24 - presented for follow up visit prior to SIM planning scan as he is to receive his radiation at [facility].  Mr. Maynard is feeling well today, appropriately anxious about next steps. He has nocturia of 4x (wakes due to hot flashes so urinates as well), frequency, and occasional urgency. No bowel issues, last colonoscopy was within the past year.  Poor erectile function has seen Dr. Ren. He continues to follow with Dr. Rice ([facility]) and is on Lupron injections and Abiraterone/Prednisone, as per patient told to hold Abiraterone due to elevated LFTs.  He is having hot flashes, decreased energy, and some depression since starting on Lupron injections and Abiraterone/Prednisone. He is looking forward to a trip to [place] at the end of December.  Met with wife and patient to discuss radiation option. Reviewed imaging. Pelvic lymph nodes could be covered by pelvic RT field, including all PSMA positive disease. PSA down to 0.19ng/mL. We discussed options for STAMPEDE RT vs RT to pelvis and prostate. Logisitics and possible acute and late side effects discussed. Opted for comprehensive RT to pelvis and prostate 28 fractions. Consent signed. Simulation today.  11/16/24 - started on RADIATION Therapy to Prostate/SV/Nodes, 7000 cGy in 28 fx with ADT   11/18/24 - OTV to complete 2/28 fx today.  Reinforced what to expect with radiation therapy, possible side effects, as well the importance of bladder filling/bowel emptying. Diet reviewed.    11/25/24 - OTV 8/28 fx completed.  Having rectal pain and mild chills, difficulty sitting down, looking for soft chair. Evaluate for rectal abscess with MRI, otherwise doing ok with radiation, start antibiotic, and continue RT in the meantime.  11/26/24 - MRI Pelvis: Collection measuring 3.4 x 3.1 x 5.0 cm between the rectum and prostate consistent with an abscess (infection associated with the spacer gel). Collection extends into the anterior rectal wall and fistulizes into the right seminal vesicle.  Admitted to [facility] from 11/26/24 - 11/28/24 after MRI results for IVAB.  Seen by urology, colorectal surgery (no surgical intervention recommended), and infectious disease.  Discharged home to continue on oral antibiotics.    12/9/24 - presents today after hospitalization for IVAB prior to restarting on radiation therapy.  He had completed 8/28 fx on 11/25/24.  Mr. Maynard is feeling better, denies any chills, fever. No discharge or blood from rectum.  Still with discomfort and feeling of irritation but denies pain, remains on antibiotic at this time, due to complete at the end of the week.  Has not been taking any Tylenol or Ibuprofen. No diarrhea, stool is softer and more frequent possibly due to antibiotics.  Nocturia of 2-4x and increase in frequency, No dysuria or hematuria.   Is off Abiraterone/Prednisone since beginning of November due to elevated LFTs, last saw Dr. Rice ([facility]) on 11/26/24 next follow up on 12/20/24.  Admitted for antibiotics for rectal abscess, now been off treatment for 2 weeks. Completed 8 fractions prior to starting RT. Now feeling much better. Only very mild symptoms in rectum. No fevers. Continuing on antibiotics. Discussed pros and cons of restarting RT vs. waiting longer for healing. Plan to restart today with regular review. He will continue ABx over the weekend as is tolerating well. He has my contact details to contact me at any time.  12/16/24 - OTV 14/28 fx completed. Mr. Maynard continues on treatment. He has an increase in tiredness/fatigue. Remains on antibiotics at this time, to complete on Thursday.  No rectal pain, some mild discomfort remains. No fever or chills. No blood or mucus from the rectum.  Urinary symptoms remain stable, adequate stream, no dysuria or hematuria.  Doing well, no fevers, discomfort in rectum improved. Continue RT.  12/23/24 - OTV 19/28 fx completed. Some continuing rectal discomfort. No worse than last week. No significant urinary symptoms. Off antibiotics now. Completed antibiotic on 12/19/24.  Advised to monitor rectal symptoms and temperature closely and let us know if any change. Continue RT. Review next week. Would like to take Friday's treatment off. Continues to follow with Dr. Rice ([facility]) last seen 12/20/24, remains on ADT alone, Abiraterone discontinued permanently.   12/30/24 - OTV 22/28 fx completed.

## 2024-12-30 NOTE — REVIEW OF SYSTEMS
[Constipation: Grade 0] : Constipation: Grade 0 [Diarrhea: Grade 0] : Diarrhea: Grade 0 [Proctitis: Grade 0] : Proctitis: Grade 0 [Rectal Pain: Grade 0] : Rectal Pain: Grade 0 [Fatigue: Grade 1 - Fatigue relieved by rest] : Fatigue: Grade 1 - Fatigue relieved by rest [Hematuria: Grade 0] : Hematuria: Grade 0 [Urinary Incontinence: Grade 0] : Urinary Incontinence: Grade 0  [Urinary Retention: Grade 0] : Urinary Retention: Grade 0 [Urinary Tract Pain: Grade 0] : Urinary Tract Pain: Grade 0 [Urinary Urgency: Grade 0] : Urinary Urgency: Grade 0 [Urinary Frequency: Grade 1 - Present] : Urinary Frequency: Grade 1 - Present [Erectile Dysfunction: Grade 2 - Decrease in erectile function (frequency/rigidity of erections), erectile intervention indicated, (e.g., medication or mechanical devices such as penile pump)] : Erectile Dysfunction: Grade 2 - Decrease in erectile function (frequency/rigidity of erections), erectile intervention indicated, (e.g., medication or mechanical devices such as penile pump) [Ejaculation Disorder: Grade 2 - Anejaculation or retrograde ejaculation] : Ejaculation Disorder: Grade 2 - Anejaculation or retrograde ejaculation [FreeTextEntry3] : stool is softer  [de-identified] : discomfort/irritation  [FreeTextEntry5] : occasional  [FreeTextEntry9] : nocturia 2-4x

## 2025-01-06 NOTE — DISEASE MANAGEMENT
[BiopsyDate] : 04/23/2024 [MeasuredProstateVolume] : 27 [TotalCores] : 11 [TotalPositiveCores] : 6 [MaxCoreInvolvement] : 85 [TTNM] : 3a [NTNM] : 1 [MTNM] : 0 [de-identified] : 5726cGy [de-identified] : 7000cGy [de-identified] : Prostate/SV/Nodes with ADT  [ADTStartedDate] : 6/2024

## 2025-01-06 NOTE — DISEASE MANAGEMENT
[BiopsyDate] : 04/23/2024 [MeasuredProstateVolume] : 27 [TotalCores] : 11 [MaxCoreInvolvement] : 85 [TotalPositiveCores] : 6 [TTNM] : 3a [NTNM] : 1 [MTNM] : 0 [de-identified] : 0782cGy [de-identified] : 7000cGy [de-identified] : Prostate/SV/Nodes with ADT  [ADTStartedDate] : 6/2024

## 2025-01-06 NOTE — HISTORY OF PRESENT ILLNESS
[FreeTextEntry1] : Mr. Maynard is a 60-year-old male who presents for an on-treatment visit.  He is unaccompanied for today's visit.    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 Anjelica score 4+3=7 in 1 site/3 cores, Carcinoma is seen involving adipose tissue, consistent with extraprostatic extension. Intraductal carcinoma of the prostate also present. Perineural invasion present. Anjelica score 3+4=7 in 2 sites/3 cores, Atypical intraductal proliferation (AIP) and Intraductal carcinoma of the prostate also present. 85% max involvement. Cribriform [place] pattern 4 is present.  PSA 6.23 ng/mL. PSMA PET with few subcentimeter retroperitoneal and left pelvic lymph nodes with increased radiotracer activity are compatible with metastatic disease.  TREATMENT: 5/21/24 - Received Orgovyx x 2 weeks  6/5/24 - started on Lupron/Eligard injections  6/2024 - 11/2024: Abiraterone/Prednisone (dose reduced due to elevated LFTs), stopped due to persistent elevated LFTs  11/16/24 - started on RADIATION Therapy to Prostate/SV/Nodes, 7000 cGy in 28 fx   PSA Trend: 1/20/23 - 3.71 ng/mL 2/6/24 - 6.23 5/20/24 - 7.96           Testosterone 272.0 ng/dL  6/30/24 - 1.67 7/17/24 - 1.15 8/9/24 - 0.64 8/31/24 - 0.39           Testosterone < 2.5 9/19/24 - 0.32 10/12/24 - 0.22 11/4/24 - 0.19 11/11/24 - 0.14  11/18/24 - 0.21 *started radiation 11/16/24* 11/23/24 - 0.24  12/19/24 - 0.10  HPI:  Mr. Maynard originally presented to urology in March 2024 for evaluation of elevated PSA of 6.23 ng/mL. MRI ordered.   4/12/24 - Prostate MRI: Prostate volume 27 cc. PSA density: 0.23 ng/mL/mL. Lesion #1, Left, posterior medial-lateral (PZpm-pl), base-to-midgland, peripheral zone (16 x 9 x 15 mm). Tumor abuts capsule causing capsular irregularity. PIRADS 5 Lesion #2, Left, posterior medial (PZpm), [id number], peripheral zone lesion extends to the midline posterior urethra at the apex (7 x 3 x 9 mm). No extraprostatic extension noted. PIRADS 4 Lesion 1 abuts the left neurovascular bundle. Lesion 1 abuts the left seminal vesicle without definite invasion. No pelvic lymphadenopathy and no suspicious bone lesions identified.    4/23/24 - underwent Prostate Biopsy: Pathology - Adenocarcinoma of Prostate, 3/8 sites and 6/11 cores involved.  Anjelica score 4+3=7 in 1 site/3 cores (MRI target lesion #1), Carcinoma is seen involving adipose tissue, consistent with extraprostatic extension. Intraductal carcinoma of the prostate also present. Perineural invasion present.  Anjelica score 3+4=7 in 2 sites/3 cores (MRI target lesion and right posterior lateral base), Atypical intraductal proliferation (AIP) and Intraductal carcinoma of the prostate also present. 85% max involvement.  Note: Cribriform [place] pattern 4 is present.    Mr. Maynard saw Dr. Umaña (urology) in follow up and biopsy results were reviewed.  Reviewed that definitive treatment would be recommended given the biopsy results.  PSMA PET ordered. Referrals to urology surgeon and radiation oncology made.  He then was seen by Dr. Pollock (urology) and Dr. Diamond ([facility]) in multidisciplinary clinic on 5/9/24, both treatment options were reviewed in detail.  Final decision about treatment to be made after PSMA PET.   5/14/24 - PSMA PET: 1. Foci of increased radiotracer activity in left side of prostate gland, extending from apex to base, corresponding to lesions identified on MRI/biopsy-proven prostate carcinoma. The intensity of radiotracer activity suggests intermediate PSMA expression. 2. Few subcentimeter retroperitoneal and left pelvic lymph nodes with increased radiotracer activity are compatible with metastatic disease.  5/22/24 - saw Dr. Rice ([facility]) in consultation. Discussed all imaging and pathology results.  He has newly diagnosed at least stage FARIDA prostate adenocarcinoma (up to [place] 7 (4+3). PSMA PET 5/14/24 showed several SUV positive sub-centimeter pelvic lymph nodes. This indicates very high-risk feature for his newly diagnosed prostate cancer. The plan is to proceed with radiation and anti- hormone therapy. The recommended treatment approach in this setting (node positive disease) based on the STAMPEDE trial is adding 2-year abiraterone (+prednisone) to ADT plus RT as an option for patients with very high-risk prostate cancer.    6/2024 - started on Lupron/Eligard injections and Abiraterone/Prednisone with Dr. Rice ([facility])  7/1/24 - saw Dr. Nielsen ([facility]) in consultation as Dr. Diamond had left the practice.  Recommended external beam radiation therapy to the prostate, in addition to continuing hormone therapy, based on the STAMPEDE study findings. The radiation therapy would be given for 5 weeks (25 treatments) after observing the PSA levels on hormone therapy (typically 4-6 months). Will coordinate with the radiation oncology team at the [facility] in [place] for the patient's radiation treatment.    10/29/24 - Had Prostate Fiducial Markers/SpaceOAR Gel placed by Dr. Nielsen ([facility]) in preparation for radiation therapy.    11/5/24 - presented for follow up visit prior to SIM planning scan as he is to receive his radiation at [facility].  Mr. Maynard is feeling well today, appropriately anxious about next steps. He has nocturia of 4x (wakes due to hot flashes so urinates as well), frequency, and occasional urgency. No bowel issues, last colonoscopy was within the past year.  Poor erectile function has seen Dr. Ren. He continues to follow with Dr. Rice ([facility]) and is on Lupron injections and Abiraterone/Prednisone, as per patient told to hold Abiraterone due to elevated LFTs.  He is having hot flashes, decreased energy, and some depression since starting on Lupron injections and Abiraterone/Prednisone. He is looking forward to a trip to [place] at the end of December.  Met with wife and patient to discuss radiation option. Reviewed imaging. Pelvic lymph nodes could be covered by pelvic RT field, including all PSMA positive disease. PSA down to 0.19ng/mL. We discussed options for STAMPEDE RT vs RT to pelvis and prostate. Logisitics and possible acute and late side effects discussed. Opted for comprehensive RT to pelvis and prostate 28 fractions. Consent signed. Simulation today.  11/16/24 - started on RADIATION Therapy to Prostate/SV/Nodes, 7000 cGy in 28 fx with ADT   11/18/24 - OTV to complete 2/28 fx today.  Reinforced what to expect with radiation therapy, possible side effects, as well the importance of bladder filling/bowel emptying. Diet reviewed.    11/25/24 - OTV 8/28 fx completed.  Having rectal pain and mild chills, difficulty sitting down, looking for soft chair. Evaluate for rectal abscess with MRI, otherwise doing ok with radiation, start antibiotic, and continue RT in the meantime.  11/26/24 - MRI Pelvis: Collection measuring 3.4 x 3.1 x 5.0 cm between the rectum and prostate consistent with an abscess (infection associated with the spacer gel). Collection extends into the anterior rectal wall and fistulizes into the right seminal vesicle.  Admitted to [facility] from 11/26/24 - 11/28/24 after MRI results for IVAB.  Seen by urology, colorectal surgery (no surgical intervention recommended), and infectious disease.  Discharged home to continue on oral antibiotics.    12/9/24 - presents today after hospitalization for IVAB prior to restarting on radiation therapy.  He had completed 8/28 fx on 11/25/24.  Mr. Maynard is feeling better, denies any chills, fever. No discharge or blood from rectum.  Still with discomfort and feeling of irritation but denies pain, remains on antibiotic at this time, due to complete at the end of the week.  Has not been taking any Tylenol or Ibuprofen. No diarrhea, stool is softer and more frequent possibly due to antibiotics.  Nocturia of 2-4x and increase in frequency, No dysuria or hematuria.   Is off Abiraterone/Prednisone since beginning of November due to elevated LFTs, last saw Dr. Rice ([facility]) on 11/26/24 next follow up on 12/20/24.  Admitted for antibiotics for rectal abscess, now been off treatment for 2 weeks. Completed 8 fractions prior to starting RT. Now feeling much better. Only very mild symptoms in rectum. No fevers. Continuing on antibiotics. Discussed pros and cons of restarting RT vs. waiting longer for healing. Plan to restart today with regular review. He will continue ABx over the weekend as is tolerating well. He has my contact details to contact me at any time.  12/16/24 - OTV 14/28 fx completed. Mr. Maynard continues on treatment. He has an increase in tiredness/fatigue. Remains on antibiotics at this time, to complete on Thursday.  No rectal pain, some mild discomfort remains. No fever or chills. No blood or mucus from the rectum.  Urinary symptoms remain stable, adequate stream, no dysuria or hematuria.  Doing well, no fevers, discomfort in rectum improved. Continue RT.  12/23/24 - OTV 19/28 fx completed. Some continuing rectal discomfort. No worse than last week. No significant urinary symptoms. Off antibiotics now. Completed antibiotic on 12/19/24.  Advised to monitor rectal symptoms and temperature closely and let us know if any change. Continue RT. Review next week. Would like to take Friday's treatment off. Continues to follow with Dr. Rice ([facility]) last seen 12/20/24, remains on ADT alone, Abiraterone discontinued permanently.   12/30/24 - OTV 22/28 fx completed.  Only very mild rectal discomfort. No mucous MO, no diarrhea. Some urinary frequency and slower stream - start tamsulosin. Otherwise doing well with RT, continue.  1/6/25 - OTV 26/28 fx completed.  Mr. Maynard looks forward to completing treatment this week. Mr. Maynard continues to do well with treatment and looks forward to completing this week.  He started the Tamsulosin which has helped.  Nocturia of 3-5x, no dysuria or hematuria. Does have some urgency. No diarrhea or constipation, no blood or mucus.  Still with some rectal discomfort, not any worse.  Discharge instructions/folder given and reviewed.  Post treatment evaluation appointment scheduled. He is to follow up with Dr. Rice ([facility]) on 2/3/24.

## 2025-01-06 NOTE — REVIEW OF SYSTEMS
[FreeTextEntry3] : stool is softer  [de-identified] : discomfort/irritation  [Urinary Urgency: Grade 1 - Present] : Urinary Urgency: Grade 1 - Present [FreeTextEntry5] : occasional  [FreeTextEntry9] : nocturia 3-5x, on Tamsulosin

## 2025-01-06 NOTE — REVIEW OF SYSTEMS
[FreeTextEntry3] : stool is softer  [de-identified] : discomfort/irritation  [Urinary Urgency: Grade 1 - Present] : Urinary Urgency: Grade 1 - Present [FreeTextEntry5] : occasional  [FreeTextEntry9] : nocturia 3-5x, on Tamsulosin

## 2025-01-06 NOTE — HISTORY OF PRESENT ILLNESS
[FreeTextEntry1] : Mr. Maynard is a 60-year-old male who presents for an on-treatment visit.  He is unaccompanied for today's visit.    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 Anjelica score 4+3=7 in 1 site/3 cores, Carcinoma is seen involving adipose tissue, consistent with extraprostatic extension. Intraductal carcinoma of the prostate also present. Perineural invasion present. Anjelica score 3+4=7 in 2 sites/3 cores, Atypical intraductal proliferation (AIP) and Intraductal carcinoma of the prostate also present. 85% max involvement. Cribriform [place] pattern 4 is present.  PSA 6.23 ng/mL. PSMA PET with few subcentimeter retroperitoneal and left pelvic lymph nodes with increased radiotracer activity are compatible with metastatic disease.  TREATMENT: 5/21/24 - Received Orgovyx x 2 weeks  6/5/24 - started on Lupron/Eligard injections  6/2024 - 11/2024: Abiraterone/Prednisone (dose reduced due to elevated LFTs), stopped due to persistent elevated LFTs  11/16/24 - started on RADIATION Therapy to Prostate/SV/Nodes, 7000 cGy in 28 fx   PSA Trend: 1/20/23 - 3.71 ng/mL 2/6/24 - 6.23 5/20/24 - 7.96           Testosterone 272.0 ng/dL  6/30/24 - 1.67 7/17/24 - 1.15 8/9/24 - 0.64 8/31/24 - 0.39           Testosterone < 2.5 9/19/24 - 0.32 10/12/24 - 0.22 11/4/24 - 0.19 11/11/24 - 0.14  11/18/24 - 0.21 *started radiation 11/16/24* 11/23/24 - 0.24  12/19/24 - 0.10  HPI:  Mr. Maynard originally presented to urology in March 2024 for evaluation of elevated PSA of 6.23 ng/mL. MRI ordered.   4/12/24 - Prostate MRI: Prostate volume 27 cc. PSA density: 0.23 ng/mL/mL. Lesion #1, Left, posterior medial-lateral (PZpm-pl), base-to-midgland, peripheral zone (16 x 9 x 15 mm). Tumor abuts capsule causing capsular irregularity. PIRADS 5 Lesion #2, Left, posterior medial (PZpm), [id number], peripheral zone lesion extends to the midline posterior urethra at the apex (7 x 3 x 9 mm). No extraprostatic extension noted. PIRADS 4 Lesion 1 abuts the left neurovascular bundle. Lesion 1 abuts the left seminal vesicle without definite invasion. No pelvic lymphadenopathy and no suspicious bone lesions identified.    4/23/24 - underwent Prostate Biopsy: Pathology - Adenocarcinoma of Prostate, 3/8 sites and 6/11 cores involved.  Anjelica score 4+3=7 in 1 site/3 cores (MRI target lesion #1), Carcinoma is seen involving adipose tissue, consistent with extraprostatic extension. Intraductal carcinoma of the prostate also present. Perineural invasion present.  Anjelica score 3+4=7 in 2 sites/3 cores (MRI target lesion and right posterior lateral base), Atypical intraductal proliferation (AIP) and Intraductal carcinoma of the prostate also present. 85% max involvement.  Note: Cribriform [place] pattern 4 is present.    Mr. Maynard saw Dr. Umaña (urology) in follow up and biopsy results were reviewed.  Reviewed that definitive treatment would be recommended given the biopsy results.  PSMA PET ordered. Referrals to urology surgeon and radiation oncology made.  He then was seen by Dr. Pollock (urology) and Dr. Diamond ([facility]) in multidisciplinary clinic on 5/9/24, both treatment options were reviewed in detail.  Final decision about treatment to be made after PSMA PET.   5/14/24 - PSMA PET: 1. Foci of increased radiotracer activity in left side of prostate gland, extending from apex to base, corresponding to lesions identified on MRI/biopsy-proven prostate carcinoma. The intensity of radiotracer activity suggests intermediate PSMA expression. 2. Few subcentimeter retroperitoneal and left pelvic lymph nodes with increased radiotracer activity are compatible with metastatic disease.  5/22/24 - saw Dr. Rice ([facility]) in consultation. Discussed all imaging and pathology results.  He has newly diagnosed at least stage FARIDA prostate adenocarcinoma (up to [place] 7 (4+3). PSMA PET 5/14/24 showed several SUV positive sub-centimeter pelvic lymph nodes. This indicates very high-risk feature for his newly diagnosed prostate cancer. The plan is to proceed with radiation and anti- hormone therapy. The recommended treatment approach in this setting (node positive disease) based on the STAMPEDE trial is adding 2-year abiraterone (+prednisone) to ADT plus RT as an option for patients with very high-risk prostate cancer.    6/2024 - started on Lupron/Eligard injections and Abiraterone/Prednisone with Dr. Rice ([facility])  7/1/24 - saw Dr. Nielsen ([facility]) in consultation as Dr. Diamond had left the practice.  Recommended external beam radiation therapy to the prostate, in addition to continuing hormone therapy, based on the STAMPEDE study findings. The radiation therapy would be given for 5 weeks (25 treatments) after observing the PSA levels on hormone therapy (typically 4-6 months). Will coordinate with the radiation oncology team at the [facility] in [place] for the patient's radiation treatment.    10/29/24 - Had Prostate Fiducial Markers/SpaceOAR Gel placed by Dr. Nielsen ([facility]) in preparation for radiation therapy.    11/5/24 - presented for follow up visit prior to SIM planning scan as he is to receive his radiation at [facility].  Mr. Maynard is feeling well today, appropriately anxious about next steps. He has nocturia of 4x (wakes due to hot flashes so urinates as well), frequency, and occasional urgency. No bowel issues, last colonoscopy was within the past year.  Poor erectile function has seen Dr. Ren. He continues to follow with Dr. Rice ([facility]) and is on Lupron injections and Abiraterone/Prednisone, as per patient told to hold Abiraterone due to elevated LFTs.  He is having hot flashes, decreased energy, and some depression since starting on Lupron injections and Abiraterone/Prednisone. He is looking forward to a trip to [place] at the end of December.  Met with wife and patient to discuss radiation option. Reviewed imaging. Pelvic lymph nodes could be covered by pelvic RT field, including all PSMA positive disease. PSA down to 0.19ng/mL. We discussed options for STAMPEDE RT vs RT to pelvis and prostate. Logisitics and possible acute and late side effects discussed. Opted for comprehensive RT to pelvis and prostate 28 fractions. Consent signed. Simulation today.  11/16/24 - started on RADIATION Therapy to Prostate/SV/Nodes, 7000 cGy in 28 fx with ADT   11/18/24 - OTV to complete 2/28 fx today.  Reinforced what to expect with radiation therapy, possible side effects, as well the importance of bladder filling/bowel emptying. Diet reviewed.    11/25/24 - OTV 8/28 fx completed.  Having rectal pain and mild chills, difficulty sitting down, looking for soft chair. Evaluate for rectal abscess with MRI, otherwise doing ok with radiation, start antibiotic, and continue RT in the meantime.  11/26/24 - MRI Pelvis: Collection measuring 3.4 x 3.1 x 5.0 cm between the rectum and prostate consistent with an abscess (infection associated with the spacer gel). Collection extends into the anterior rectal wall and fistulizes into the right seminal vesicle.  Admitted to [facility] from 11/26/24 - 11/28/24 after MRI results for IVAB.  Seen by urology, colorectal surgery (no surgical intervention recommended), and infectious disease.  Discharged home to continue on oral antibiotics.    12/9/24 - presents today after hospitalization for IVAB prior to restarting on radiation therapy.  He had completed 8/28 fx on 11/25/24.  Mr. Maynard is feeling better, denies any chills, fever. No discharge or blood from rectum.  Still with discomfort and feeling of irritation but denies pain, remains on antibiotic at this time, due to complete at the end of the week.  Has not been taking any Tylenol or Ibuprofen. No diarrhea, stool is softer and more frequent possibly due to antibiotics.  Nocturia of 2-4x and increase in frequency, No dysuria or hematuria.   Is off Abiraterone/Prednisone since beginning of November due to elevated LFTs, last saw Dr. Rice ([facility]) on 11/26/24 next follow up on 12/20/24.  Admitted for antibiotics for rectal abscess, now been off treatment for 2 weeks. Completed 8 fractions prior to starting RT. Now feeling much better. Only very mild symptoms in rectum. No fevers. Continuing on antibiotics. Discussed pros and cons of restarting RT vs. waiting longer for healing. Plan to restart today with regular review. He will continue ABx over the weekend as is tolerating well. He has my contact details to contact me at any time.  12/16/24 - OTV 14/28 fx completed. Mr. Maynard continues on treatment. He has an increase in tiredness/fatigue. Remains on antibiotics at this time, to complete on Thursday.  No rectal pain, some mild discomfort remains. No fever or chills. No blood or mucus from the rectum.  Urinary symptoms remain stable, adequate stream, no dysuria or hematuria.  Doing well, no fevers, discomfort in rectum improved. Continue RT.  12/23/24 - OTV 19/28 fx completed. Some continuing rectal discomfort. No worse than last week. No significant urinary symptoms. Off antibiotics now. Completed antibiotic on 12/19/24.  Advised to monitor rectal symptoms and temperature closely and let us know if any change. Continue RT. Review next week. Would like to take Friday's treatment off. Continues to follow with Dr. Rice ([facility]) last seen 12/20/24, remains on ADT alone, Abiraterone discontinued permanently.   12/30/24 - OTV 22/28 fx completed.  Only very mild rectal discomfort. No mucous TN, no diarrhea. Some urinary frequency and slower stream - start tamsulosin. Otherwise doing well with RT, continue.  1/6/25 - OTV 26/28 fx completed.  Mr. Maynard looks forward to completing treatment this week. Mr. Maynard continues to do well with treatment and looks forward to completing this week.  He started the Tamsulosin which has helped.  Nocturia of 3-5x, no dysuria or hematuria. Does have some urgency. No diarrhea or constipation, no blood or mucus.  Still with some rectal discomfort, not any worse.  Discharge instructions/folder given and reviewed.  Post treatment evaluation appointment scheduled. He is to follow up with Dr. Rice ([facility]) on 2/3/24.

## 2025-01-15 NOTE — HISTORY OF PRESENT ILLNESS
[FreeTextEntry1] : fuv [de-identified] : GARO GARDUNO is a 59 yo man with hypertension, hypercholesterolemia, neck pain, prostate cancer here for a bp check.  He has been well overall.  Has been off statin. Will restart at low dose and recheck labs in 6 weeks fasting  Colonoscopy utd 2024 dr hurd. Derm utd.   The patient is  with 6 children. He is an executive of a pharmaceutical company.  He exercises regularly without difficulty.

## 2025-01-18 NOTE — DISEASE MANAGEMENT
[4] : T4 [a] : a [1] : N1 [0-10] : 0 -10 ng/mL [Biopsy with Fusion] : Patient had a biopsy with fusion on [7(4+3)] : Fusion Biopsy [place] Score: 7(4+3) [Biopsy results sent to PCP/Referring Physician] : Biopsy results sent to PCP/Referring Physician [] : Patient had a Prostate MRI [5] : 5 [Seminal Vesicle Invasion] : Seminal vesicle invasion [Lymph Node(s)] : Lymph Node(s) [FARIDA] : FARIDA [BiopsyDate] : 04/2024 [MeasuredProstateVolume] : 27cc [TotalCores] : 8 [TotalPositiveCores] : 3 [MaxCoreInvolvement] : 85%

## 2025-01-18 NOTE — DISEASE MANAGEMENT
[4] : T4 [a] : a [1] : N1 [0-10] : 0 -10 ng/mL [Biopsy with Fusion] : Patient had a biopsy with fusion on [7(4+3)] : Fusion Biopsy [place] Score: 7(4+3) [Biopsy results sent to PCP/Referring Physician] : Biopsy results sent to PCP/Referring Physician [] : Patient had a Prostate MRI [5] : 5 [Seminal Vesicle Invasion] : Seminal vesicle invasion [Lymph Node(s)] : Lymph Node(s) [FARIDA] : FARIDA [BiopsyDate] : 04/2024 [MeasuredProstateVolume] : 27cc [TotalCores] : 8 [TotalPositiveCores] : 3 [MaxCoreInvolvement] : 85%

## 2025-01-18 NOTE — HISTORY OF PRESENT ILLNESS
[FreeTextEntry1] :   Mario Reyna is a 60 year-old male with no family history of Prostate Ca. noted elevated PSA of 6.23ng/mL on 2/6/2024, He underwent MRI prostate on 4/12/2024 that revealed PI-RADS 5, showed 4.4X2.9X4.1 cm, with dominant lesions in the left posterior medial-lateral base to mid gland peripheral zone, with concern for capsular involvement but no clear EPE, abutting the left NVB and SV without clear invasion, and left posterior medial mid gland to apex, peripheral zone lesion extending to the midline posterior urethra at the apex. 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  In july Abiraatrone was held secondary to increase LFTS in which they returned to baseline and resume the medication.  Mr. Maynard is here today to discuss radiation therapy. He underwent placement of SpaceOAr Gel and Fiducle Markers on 10/29/204 and then completed his radiation with  on   11/ 2024 with total of 7000cGy radiation to the Prostate.   Urologist: Dr. Kam Hem Onco: Dr. Rice  PSA Trend: ng/mL  12/19/2024:  0.10ng/mL 11/23/2024:  0.24ng/mL 11/18/2024:  0.21ng/mL 11/4/2024:    0.19ng/mL 10/12/2024:  0.22ng/mL 9/19/2024:    0,32ng/mL 8/31/2024:   0.39ng/mL 8/9/2024:     0.64ng/mL 7/17/2024:   1.15ng/mL 6/30/2024:   1.67ng/mL 5/20/2024:   7.96ng/mL 2/6/2024:     6.23ng/mL 1/20/2023:   3.71ng/mL 4/1/2016:      1.11ng/mL   MRI Prostate with and without contrast 4/12/2024  IMPRESSION: Prostate lesions as detailed above. PIRADS 5 - Very high (clinically significant cancer is highly likely to be present)  Prostate Volume: 27 mL PSA density: 0.23 ng/mL/mL  LESION: #1 Location: Left, posterior medial-lateral (PZpm-pl), base-to-midgland, peripheral zone. Slice#: Series 4, Image 20. Size (transverse, AP, CC): 16 x 9 x 15 mm. T2-WI: 5 - Circumscribed, homogenous moderate hypointense focus/mass; greater than 1.5 cm in greatest dimension. DWI: 5 - Focal markedly hypointense on ADC and markedly hyperintense on high b-value DWI; greater than 1.5 cm in greatest dimension. DCE: Positive - focal, and; earlier than or contemporaneous with enhancement of adjacent normal prostatic tissues, and; corresponds to suspicious finding on T2W and/or DWI. Extra-prostatic extension: Irregularity, tumor abuts capsule causing capsular irregularity. Prior Comparison: None. PI-RADS Assessment Category: 5, Very High  LESION: #2 Location: Left, posterior medial (PZpm), [id number], peripheral zone lesion extends to the midline posterior urethra at the apex. Slice#: Series 4, Image 26. Size (transverse, AP, CC): 7 x 3 x 9 mm. T2-WI: 3 - Heterogeneous signal intensity or non-circumscribed, rounded, moderate hypo intensity (including others that do not qualify as 2, 4, or 5). DWI: 4 - Focal markedly hypointense on ADC and markedly hyperintense on high b-value DWI; less than 1.5cm in greatest dimension. DCE: Positive - focal, and earlier than or contemporaneous with enhancement of adjacent normal prostatic tissues, and corresponds to suspicious finding on T2W and/or DWI. Extra-prostatic extension: None. Prior Comparison: None. PI-RADS Assessment Category: 4, High Likely extension of lesion 1 towards the apex.  Neurovascular bundle: Lesion 1 abuts the left neurovascular bundle. Seminal vesicles: Lesion 1 abuts the left seminal vesicle without definite invasion. Lymph nodes: No pelvic adenopathy. Bones: No suspicious lesions identified. Urinary bladder: Unremarkable. Other: None.  PATHOLOGY: 4/23/2024 3 out of 8 cores re positive,55%-85% tissue involvement Heyburn Score 7 (4+3) GG3  PET/CT PSMA 5/14/2024  IMPRESSION: Abnormal whole-body PSMA-PET/CT scan.  1. Foci of increased radiotracer activity in left side of prostate gland, extending from apex to base, corresponding to lesions identified on MRI/biopsy-proven prostate carcinoma. The intensity of radiotracer activity suggests intermediate PSA May expression.  2. Few Sub centimeter retroperitoneal and left pelvic lymph nodes with increased radiotracer activity are compatible with metastatic disease.   Mr. Maynard is here today for post treatment evaluation via telehealth.   presents today for follow up via telehealth.   He recently received Lupron injection (9/2024) He is experiencing fatigue, hot flashes and night sweats. He has some urinary urgency, frequency Nocturia X2 on no Flomax. He denies any dysuria or hematuria. Bowel movements are normal. He will be obtaining a new PSA in 2weeks and will follow up   Reviewed RT therapy that is needed aslong with Space OAr Gel. He verbalized his understanding and will reach out to him once we review the following PSA results.

## 2025-01-18 NOTE — HISTORY OF PRESENT ILLNESS
[FreeTextEntry1] :   Mario Reyna is a 60 year-old male with no family history of Prostate Ca. noted elevated PSA of 6.23ng/mL on 2/6/2024, He underwent MRI prostate on 4/12/2024 that revealed PI-RADS 5, showed 4.4X2.9X4.1 cm, with dominant lesions in the left posterior medial-lateral base to mid gland peripheral zone, with concern for capsular involvement but no clear EPE, abutting the left NVB and SV without clear invasion, and left posterior medial mid gland to apex, peripheral zone lesion extending to the midline posterior urethra at the apex. 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  In july Abiraatrone was held secondary to increase LFTS in which they returned to baseline and resume the medication.  Mr. Maynard is here today to discuss radiation therapy. He underwent placement of SpaceOAr Gel and Fiducle Markers on 10/29/204 and then completed his radiation with  on   11/ 2024 with total of 7000cGy radiation to the Prostate.   Urologist: Dr. Kam Hem Onco: Dr. Rice  PSA Trend: ng/mL  12/19/2024:  0.10ng/mL 11/23/2024:  0.24ng/mL 11/18/2024:  0.21ng/mL 11/4/2024:    0.19ng/mL 10/12/2024:  0.22ng/mL 9/19/2024:    0,32ng/mL 8/31/2024:   0.39ng/mL 8/9/2024:     0.64ng/mL 7/17/2024:   1.15ng/mL 6/30/2024:   1.67ng/mL 5/20/2024:   7.96ng/mL 2/6/2024:     6.23ng/mL 1/20/2023:   3.71ng/mL 4/1/2016:      1.11ng/mL   MRI Prostate with and without contrast 4/12/2024  IMPRESSION: Prostate lesions as detailed above. PIRADS 5 - Very high (clinically significant cancer is highly likely to be present)  Prostate Volume: 27 mL PSA density: 0.23 ng/mL/mL  LESION: #1 Location: Left, posterior medial-lateral (PZpm-pl), base-to-midgland, peripheral zone. Slice#: Series 4, Image 20. Size (transverse, AP, CC): 16 x 9 x 15 mm. T2-WI: 5 - Circumscribed, homogenous moderate hypointense focus/mass; greater than 1.5 cm in greatest dimension. DWI: 5 - Focal markedly hypointense on ADC and markedly hyperintense on high b-value DWI; greater than 1.5 cm in greatest dimension. DCE: Positive - focal, and; earlier than or contemporaneous with enhancement of adjacent normal prostatic tissues, and; corresponds to suspicious finding on T2W and/or DWI. Extra-prostatic extension: Irregularity, tumor abuts capsule causing capsular irregularity. Prior Comparison: None. PI-RADS Assessment Category: 5, Very High  LESION: #2 Location: Left, posterior medial (PZpm), [id number], peripheral zone lesion extends to the midline posterior urethra at the apex. Slice#: Series 4, Image 26. Size (transverse, AP, CC): 7 x 3 x 9 mm. T2-WI: 3 - Heterogeneous signal intensity or non-circumscribed, rounded, moderate hypo intensity (including others that do not qualify as 2, 4, or 5). DWI: 4 - Focal markedly hypointense on ADC and markedly hyperintense on high b-value DWI; less than 1.5cm in greatest dimension. DCE: Positive - focal, and earlier than or contemporaneous with enhancement of adjacent normal prostatic tissues, and corresponds to suspicious finding on T2W and/or DWI. Extra-prostatic extension: None. Prior Comparison: None. PI-RADS Assessment Category: 4, High Likely extension of lesion 1 towards the apex.  Neurovascular bundle: Lesion 1 abuts the left neurovascular bundle. Seminal vesicles: Lesion 1 abuts the left seminal vesicle without definite invasion. Lymph nodes: No pelvic adenopathy. Bones: No suspicious lesions identified. Urinary bladder: Unremarkable. Other: None.  PATHOLOGY: 4/23/2024 3 out of 8 cores re positive,55%-85% tissue involvement [place] Score 7 (4+3) GG3  PET/CT PSMA 5/14/2024  IMPRESSION: Abnormal whole-body PSMA-PET/CT scan.  1. Foci of increased radiotracer activity in left side of prostate gland, extending from apex to base, corresponding to lesions identified on MRI/biopsy-proven prostate carcinoma. The intensity of radiotracer activity suggests intermediate PSA May expression.  2. Few Sub centimeter retroperitoneal and left pelvic lymph nodes with increased radiotracer activity are compatible with metastatic disease.   Mr. Maynard is here today for post treatment evaluation via telehealth.   presents today for follow up via telehealth.   He recently received Lupron injection (9/2024) He is experiencing fatigue, hot flashes and night sweats. He has some urinary urgency, frequency Nocturia X2 on no Flomax. He denies any dysuria or hematuria. Bowel movements are normal. He will be obtaining a new PSA in 2weeks and will follow up   Reviewed RT therapy that is needed aslong with Space OAr Gel. He verbalized his understanding and will reach out to him once we review the following PSA results.

## 2025-01-21 NOTE — VITALS
[Maximal Pain Intensity: 0/10] : 0/10 [Least Pain Intensity: 0/10] : 0/10 [90: Able to carry normal activity; minor signs or symptoms of disease.] : 90: Able to carry normal activity; minor signs or symptoms of disease.  [FreeTextEntry1] : telehealth

## 2025-01-21 NOTE — DISEASE MANAGEMENT
[BiopsyDate] : 04/2024 [MeasuredProstateVolume] : 27cc [TotalCores] : 8 [TotalPositiveCores] : 3 [MaxCoreInvolvement] : 85%

## 2025-01-21 NOTE — REVIEW OF SYSTEMS
[Fecal Incontinence: Grade 0] : Fecal Incontinence: Grade 0 [Nausea: Grade 0] : Nausea: Grade 0 [Proctitis: Grade 1 - Rectal discomfort, intervention not indicated] : Proctitis: Grade 1 - Rectal discomfort, intervention not indicated [Rectal Pain: Grade 0] : Rectal Pain: Grade 0 [Hematuria: Grade 0] : Hematuria: Grade 0 [Urinary Incontinence: Grade 0] : Urinary Incontinence: Grade 0  [Urinary Retention: Grade 0] : Urinary Retention: Grade 0 [Urinary Tract Pain: Grade 0] : Urinary Tract Pain: Grade 0 [Urinary Urgency: Grade 0] : Urinary Urgency: Grade 0 [Urinary Frequency: Grade 0] : Urinary Frequency: Grade 0 [Erectile Dysfunction: Grade 2 - Decrease in erectile function (frequency/rigidity of erections), erectile intervention indicated, (e.g., medication or mechanical devices such as penile pump)] : Erectile Dysfunction: Grade 2 - Decrease in erectile function (frequency/rigidity of erections), erectile intervention indicated, (e.g., medication or mechanical devices such as penile pump) [Ejaculation Disorder: Grade 2 - Anejaculation or retrograde ejaculation] : Ejaculation Disorder: Grade 2 - Anejaculation or retrograde ejaculation

## 2025-01-21 NOTE — HISTORY OF PRESENT ILLNESS
[FreeTextEntry1] :   Mario Reyna is a 60 year-old male with no family history of Prostate Ca. noted elevated PSA of 6.23ng/mL on 2/6/2024, He underwent MRI prostate on 4/12/2024 that revealed PI-RADS 5, showed 4.4X2.9X4.1 cm, with dominant lesions in the left posterior medial-lateral base to mid gland peripheral zone, with concern for capsular involvement but no clear EPE, abutting the left NVB and SV without clear invasion, and left posterior medial mid gland to apex, peripheral zone lesion extending to the midline posterior urethra at the apex. 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  In july Abiraatrone was held secondary to increase LFTS in which they returned to baseline and resume the medication.  Mr. Maynard is here today to discuss radiation therapy. He underwent placement of SpaceOAr Gel and Fiducle Markers on 10/29/204 and then completed his radiation with  on   11/ 2024 with total of 7000cGy radiation to the Prostate.   Urologist: Dr. Kam Hem Onco: Dr. Rice  PSA Trend: ng/mL  12/19/2024:  0.10ng/mL 11/23/2024:  0.24ng/mL 11/18/2024:  0.21ng/mL 11/4/2024:    0.19ng/mL 10/12/2024:  0.22ng/mL 9/19/2024:    0,32ng/mL 8/31/2024:   0.39ng/mL 8/9/2024:     0.64ng/mL 7/17/2024:   1.15ng/mL 6/30/2024:   1.67ng/mL 5/20/2024:   7.96ng/mL 2/6/2024:     6.23ng/mL 1/20/2023:   3.71ng/mL 4/1/2016:      1.11ng/mL   MRI Prostate with and without contrast 4/12/2024  IMPRESSION: Prostate lesions as detailed above. PIRADS 5 - Very high (clinically significant cancer is highly likely to be present)  Prostate Volume: 27 mL PSA density: 0.23 ng/mL/mL  LESION: #1 Location: Left, posterior medial-lateral (PZpm-pl), base-to-midgland, peripheral zone. Slice#: Series 4, Image 20. Size (transverse, AP, CC): 16 x 9 x 15 mm. T2-WI: 5 - Circumscribed, homogenous moderate hypointense focus/mass; greater than 1.5 cm in greatest dimension. DWI: 5 - Focal markedly hypointense on ADC and markedly hyperintense on high b-value DWI; greater than 1.5 cm in greatest dimension. DCE: Positive - focal, and; earlier than or contemporaneous with enhancement of adjacent normal prostatic tissues, and; corresponds to suspicious finding on T2W and/or DWI. Extra-prostatic extension: Irregularity, tumor abuts capsule causing capsular irregularity. Prior Comparison: None. PI-RADS Assessment Category: 5, Very High  LESION: #2 Location: Left, posterior medial (PZpm), [id number], peripheral zone lesion extends to the midline posterior urethra at the apex. Slice#: Series 4, Image 26. Size (transverse, AP, CC): 7 x 3 x 9 mm. T2-WI: 3 - Heterogeneous signal intensity or non-circumscribed, rounded, moderate hypo intensity (including others that do not qualify as 2, 4, or 5). DWI: 4 - Focal markedly hypointense on ADC and markedly hyperintense on high b-value DWI; less than 1.5cm in greatest dimension. DCE: Positive - focal, and earlier than or contemporaneous with enhancement of adjacent normal prostatic tissues, and corresponds to suspicious finding on T2W and/or DWI. Extra-prostatic extension: None. Prior Comparison: None. PI-RADS Assessment Category: 4, High Likely extension of lesion 1 towards the apex.  Neurovascular bundle: Lesion 1 abuts the left neurovascular bundle. Seminal vesicles: Lesion 1 abuts the left seminal vesicle without definite invasion. Lymph nodes: No pelvic adenopathy. Bones: No suspicious lesions identified. Urinary bladder: Unremarkable. Other: None.  PATHOLOGY: 4/23/2024 3 out of 8 cores re positive,55%-85% tissue involvement Melrose Score 7 (4+3) GG3  PET/CT PSMA 5/14/2024  IMPRESSION: Abnormal whole-body PSMA-PET/CT scan.  1. Foci of increased radiotracer activity in left side of prostate gland, extending from apex to base, corresponding to lesions identified on MRI/biopsy-proven prostate carcinoma. The intensity of radiotracer activity suggests intermediate PSA May expression.  2. Few Sub centimeter retroperitoneal and left pelvic lymph nodes with increased radiotracer activity are compatible with metastatic disease.   Mr. Maynard is here today for post treatment evaluation via telehealth.   presents today for follow up via telehealth.   He recently received Lupron injection (9/2024) He is experiencing fatigue, hot flashes and night sweats. He has some urinary urgency, frequency Nocturia X2 on no Flomax. He denies any dysuria or hematuria. Bowel movements are normal. He will be obtaining a new PSA in 2weeks and will follow up   Reviewed RT therapy that is needed aslong with Space OAr Gel. He verbalized his understanding and will reach out to him once we review the following PSA results.

## 2025-01-25 NOTE — DISEASE MANAGEMENT
[0-10] : 0 -10 ng/mL [Biopsy with Fusion] : Patient had a biopsy with fusion on [7(3+4)] : Template Biopsy Huntington Score: 7(3+4) [7(4+3)] : Fusion Biopsy Hernandez Score: 7(4+3) [] : Patient had a Prostate MRI [5] : 5 [BiopsyDate] : 04/23/2024 [MeasuredProstateVolume] : 27 [TotalCores] : 11 [TotalPositiveCores] : 6 [MaxCoreInvolvement] : 85 [FARIDA] : FARIDA [Radiation Therapy] : Radiation Therapy [Androgen Ablation] : Androgen Ablation [Treatment with radiation therapy] : Treatment with radiation therapy [EBRT] : EBRT [Treatment with androgen ablation] : Treatment with androgen ablation [RadiationCompletedDate] : 1/8/25 [EBRTDose] : 7000cGy [EBRTFractions] : 28 [ADTStartedDate] : 6/2024 [Pathological] : TNM Stage: p [TTNM] : 3a [NTNM] : 1 [MTNM] : 0

## 2025-01-25 NOTE — HISTORY OF PRESENT ILLNESS
[FreeTextEntry1] : Mr. Maynard is a 60-year-old male who presents for post treatment evaluation appointment.    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 [place] score 4+3=7 in 1 site/3 cores, Carcinoma is seen involving adipose tissue, consistent with extraprostatic extension. Intraductal carcinoma of the prostate also present. Perineural invasion present. [place] score 3+4=7 in 2 sites/3 cores, Atypical intraductal proliferation (AIP) and Intraductal carcinoma of the prostate also present. 85% max involvement. Cribriform Anjelica pattern 4 is present.  PSA 6.23 ng/mL. PSMA PET with few subcentimeter retroperitoneal and left pelvic lymph nodes with increased radiotracer activity are compatible with metastatic disease.  TREATMENT: 5/21/24 - Received Orgovyx x 2 weeks  6/5/24 - started on Lupron/Eligard injections  6/2024 - 11/2024: Abiraterone/Prednisone (dose reduced due to elevated LFTs), stopped due to persistent elevated LFTs  11/16/24 - 1/8/25: Received RADIATION Therapy to Prostate/SV/Nodes, 7000 cGy in 28 fx   PSA Trend: 1/20/23 - 3.71 ng/mL 2/6/24 - 6.23 5/20/24 - 7.96           Testosterone 272.0 ng/dL  6/30/24 - 1.67 7/17/24 - 1.15 8/9/24 - 0.64 8/31/24 - 0.39           Testosterone < 2.5 9/19/24 - 0.32 10/12/24 - 0.22 11/4/24 - 0.19 11/11/24 - 0.14  11/18/24 - 0.21 *started radiation 11/16/24* 11/23/24 - 0.24  12/19/24 - 0.10  HPI:  Mr. Maynard originally presented to urology in March 2024 for evaluation of elevated PSA of 6.23 ng/mL. MRI ordered.   4/12/24 - Prostate MRI: Prostate volume 27 cc. PSA density: 0.23 ng/mL/mL. Lesion #1, Left, posterior medial-lateral (PZpm-pl), base-to-midgland, peripheral zone (16 x 9 x 15 mm). Tumor abuts capsule causing capsular irregularity. PIRADS 5 Lesion #2, Left, posterior medial (PZpm), [id number], peripheral zone lesion extends to the midline posterior urethra at the apex (7 x 3 x 9 mm). No extraprostatic extension noted. PIRADS 4 Lesion 1 abuts the left neurovascular bundle. Lesion 1 abuts the left seminal vesicle without definite invasion. No pelvic lymphadenopathy and no suspicious bone lesions identified.    4/23/24 - underwent Prostate Biopsy: Pathology - Adenocarcinoma of Prostate, 3/8 sites and 6/11 cores involved.  [place] score 4+3=7 in 1 site/3 cores (MRI target lesion #1), Carcinoma is seen involving adipose tissue, consistent with extraprostatic extension. Intraductal carcinoma of the prostate also present. Perineural invasion present.  [place] score 3+4=7 in 2 sites/3 cores (MRI target lesion and right posterior lateral base), Atypical intraductal proliferation (AIP) and Intraductal carcinoma of the prostate also present. 85% max involvement.  Note: Cribriform Anjelica pattern 4 is present.    Mr. Maynard saw Dr. Umaña (urology) in follow up and biopsy results were reviewed.  Reviewed that definitive treatment would be recommended given the biopsy results.  PSMA PET ordered. Referrals to urology surgeon and radiation oncology made.  He then was seen by Dr. Pollock (urology) and Dr. Diamond ([facility]) in multidisciplinary clinic on 5/9/24, both treatment options were reviewed in detail.  Final decision about treatment to be made after PSMA PET.   5/14/24 - PSMA PET: 1. Foci of increased radiotracer activity in left side of prostate gland, extending from apex to base, corresponding to lesions identified on MRI/biopsy-proven prostate carcinoma. The intensity of radiotracer activity suggests intermediate PSMA expression. 2. Few subcentimeter retroperitoneal and left pelvic lymph nodes with increased radiotracer activity are compatible with metastatic disease.  5/22/24 - saw Dr. Rice ([facility]) in consultation. Discussed all imaging and pathology results.  He has newly diagnosed at least stage FARIDA prostate adenocarcinoma (up to Anjelica 7 (4+3). PSMA PET 5/14/24 showed several SUV positive sub-centimeter pelvic lymph nodes. This indicates very high-risk feature for his newly diagnosed prostate cancer. The plan is to proceed with radiation and anti- hormone therapy. The recommended treatment approach in this setting (node positive disease) based on the STAMPEDE trial is adding 2-year abiraterone (+prednisone) to ADT plus RT as an option for patients with very high-risk prostate cancer.    6/2024 - started on Lupron/Eligard injections and Abiraterone/Prednisone with Dr. Rice ([facility])  7/1/24 - saw Dr. Nielsen ([facility]) in consultation as Dr. Diamond had left the practice.  Recommended external beam radiation therapy to the prostate, in addition to continuing hormone therapy, based on the STAMPEDE study findings. The radiation therapy would be given for 5 weeks (25 treatments) after observing the PSA levels on hormone therapy (typically 4-6 months). Will coordinate with the radiation oncology team at the [facility] in [place] for the patient's radiation treatment.    10/29/24 - Had Prostate Fiducial Markers/SpaceOAR Gel placed by Dr. Nielsen ([facility]) in preparation for radiation therapy.    11/5/24 - presented for follow up visit prior to SIM planning scan as he is to receive his radiation at [facility].  Mr. Maynard is feeling well today, appropriately anxious about next steps. He has nocturia of 4x (wakes due to hot flashes so urinates as well), frequency, and occasional urgency. No bowel issues, last colonoscopy was within the past year.  Poor erectile function has seen Dr. Ren. He continues to follow with Dr. Rice ([facility]) and is on Lupron injections and Abiraterone/Prednisone, as per patient told to hold Abiraterone due to elevated LFTs.  He is having hot flashes, decreased energy, and some depression since starting on Lupron injections and Abiraterone/Prednisone. He is looking forward to a trip to [place] at the end of December.  Met with wife and patient to discuss radiation option. Reviewed imaging. Pelvic lymph nodes could be covered by pelvic RT field, including all PSMA positive disease. PSA down to 0.19ng/mL. We discussed options for STAMPEDE RT vs RT to pelvis and prostate. [employer] and possible acute and late side effects discussed. Opted for comprehensive RT to pelvis and prostate 28 fractions. Consent signed. Simulation today.  11/16/24 - 1/8/25: Received RADIATION Therapy to Prostate/SV/Nodes, 7000 cGy in 28 fx with ADT  *Treatment break from 11/26/24 - 12/6/24 due to hospitalization*  11/26/24 - MRI Pelvis: Collection measuring 3.4 x 3.1 x 5.0 cm between the rectum and prostate consistent with an abscess (infection associated with the spacer gel). Collection extends into the anterior rectal wall and fistulizes into the right seminal vesicle.  Admitted to [facility] from 11/26/24 - 11/28/24 after MRI results for IVAB.  Seen by urology, colorectal surgery (no surgical intervention recommended), and infectious disease.  Discharged home to continue on oral antibiotics.    12/9/24 - presented after hospitalization for IVAB prior to restarting on radiation therapy.  He had completed 8/28 fx on 11/25/24.  Mr. Maynard is feeling better, denies any chills, fever. No discharge or blood from rectum.  Still with discomfort and feeling of irritation but denies pain, remains on antibiotic at this time, due to complete at the end of the week.  Has not been taking any Tylenol or Ibuprofen. No diarrhea, stool is softer and more frequent possibly due to antibiotics.  Nocturia of 2-4x and increase in frequency, No dysuria or hematuria.   Is off Abiraterone/Prednisone since beginning of November due to elevated LFTs, last saw Dr. Rice ([facility]) on 11/26/24 next follow up on 12/20/24.  Admitted for antibiotics for rectal abscess, now been off treatment for 2 weeks. Completed 8 fractions prior to stopping RT. Now feeling much better. Only very mild symptoms in rectum. No fevers. Continuing on antibiotics. Discussed pros and cons of restarting RT vs. waiting longer for healing. Plan to restart today with regular review. He will continue ABx over the weekend as is tolerating well. He has my contact details to contact me at any time.  1/8/25 - completed RADIATION Therapy to Prostate/SV/Nodes, 7000 cGy in 28 fx with ADT   2/4/25 - presents for post treatment evaluation appointment.   He follows with Dr. Rice ([facility]) with visit on 2/3/25, remains on Eligard injections.  Also saw Dr. Nielsen for Telehealth follow up on 1/21/25.

## 2025-01-26 NOTE — ASSESSMENT
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 He has at least stage FARIDA prostate adenocarcinoma (up to [place] 7 (4+3). PSMA PET 5/14/24 showed several SUV positive sub-centimeter pelvic lymph nodes. This indicates very high risk feature for his newly diagnosed prostate cancer. The plan is to proceed with radiation and anti- hormone therapy. The recommended treatment approach in this setting (node positive disease) based on the STAMPEDE trial is adding 2 year abiraterone (+prednisone) to ADT plus RT as an option for patients with very high risk prostate cancer. Patient had been started on Orgovyx on 5/20/24, he continued for two weeks and then received first dose of Lupron on 6/5/24. He started abiraterone and prednisone in June 2024.  Recommend genetic testing given he has node positive disease. Based on his FRAX score 10 years of probability of hip fracture 0.5%, a 10-year probability of a major osteoporosis-related fracture 6%. He does not require DEXA scan and bisphosphate vs prolia.   Plan: Very high risk of prostate cancer with pelvic nodes - Initial PSA was 7.96 on 5/20/24, most recently 0.1 on 12/19/24.  - Continue Lupron [id number], last given 12/4/24, next scheduled for 3/4/25.  - Per d/w Dr. Rice, given ALT >20x ULN, abiraterone has been permanently discontinued. He will continue on ADT alone at this time.  - RT to the prostate and pelvic LNs x 28 fx started 11/15/24, completed early Jan 2025 (delay in tx d/t perirectal abscess)  Katherine rectal abscess: - mild discomfort around the rectal area. COmpleted augmentin. MRI of the pelvis done today 11/26 shows a collection measuring 3.4 x 3.1 x 5.0 cm between the rectum and prostate consistent with an abscess (infection associated with the spacer gel). - Pt has subjective fevers (taking Motrin around the clock) and chills. Dr. Angel was present for a portion of this visit, pt referred to [facility] ED for abscess drainage and IV abx. I have emailed the inpatient oncology team and [facility] ED made aware via Teams.   Transaminitis -  Reviewed 8/31/24 LFT with pt/spouse noting AST-59, ALT - 94, T.B - 1.4; will continue to monitor < GR 1 - 11/4/24 AST = 467, ALT = 1,0006 - grade 4 - 11/23/24 AST = 28, ALT = 56 - Abiraterone discontinued permanently - Repeat LFTs today are pending.   Instructed to contact our office with any new/worsening symptoms. Pt educated regarding plan of care, all questions/concerns addressed to the best of my abilities and his apparent satisfaction. F/u in 6-8wks  [Future Reassessment of Pain Scale] : Future reassessment of pain scale    [Medication(s)] : Medication(s)

## 2025-01-26 NOTE — HISTORY OF PRESENT ILLNESS
[Disease: _____________________] : Disease: [unfilled] [T: ___] : T[unfilled] [N: ___] : N[unfilled] [AJCC Stage: ____] : AJCC Stage: [unfilled] [de-identified] : 59 year old male who is presenting for initial medical oncology evaluation for prostate adenocarcinoma. He noted a significant rise in his PSA to 6.23 on 2/6/24 (previous 3.71 on 1/20/23 and 1.11 on 4/1/16). MRI 4/12/24 showed 4.4 x 2.9 x 4.1 cm = 27 cc gland, with dominant lesions in the left posterior medial-lateral (PZpm-pl) base-to-midgland peripheral zone (1.6 cm, TX-5), with concern for capsular involvement but no clear EPE, abutting the left NVB and SV without clear invasion, and left posterior medial (PZpm) [id number] peripheral zone lesion extending to the midline posterior urethra at the apex (0.9 cm, TX-4). No LAD. No major anatomic issues. Prostate biopsy 4/23/24 showed right posterior lateral base G 3+4 with 50% involvement, MRI target#1 left PZ G 4+3 among 3 cores with 60%, 55% and 55% involvement with intraductal component, MRI #2 target left PZ G 3+4 among 2 cores with 85% and 55% involvement with intraductal and cribriform component. (small prostate so limited number of sites per Dr. Umaña). PSMA PET 5/14/24 showed increased radiotracer activity in the left side of the prostate gland suggests intermediate PSA expression, with compatible metastatic disease indicated by 0.6x0.3cm left para-aortic/common iliac node SUV 7.0, a 5mm left common iliac node SUV 5.8, a 0.6x0.6cm left external iliac node SUV 10.1, PSA 7.96 and Testosterone on 272 5/20/24. He was started on Orgovyx by urology on 5/21/24   Interval History: Overall he has been doing well. He denies any issues with urination. No constipation or diarrhea. He reports that he is great. He remains active and goes to the gym 6 days per week, as well as, golf a couple days.    PMH/PSH: Appendectomy, hernia repair, testicular torsion repair, vasectomy, cervical spinal fusion; HTN (Edarbi, Amlodopine) /HLD (atorvastin)   Social: Never smoker, occasional alcohol use. 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 Has six children (age 31- 15 years)  Family History: Maternal Uncle with hx of bladder cancer    6/12/24: Patient is more fatigued than normal, says he that he normally goes to bed pretty early and is finding he wants to go to bed even earlier. He does note that since stopping  orgovyx and starting lupron and stephanie/pred the fatigue has actually improved. He was exhausted when taking orgovyx. He is having hot flashes throughout the day, says they are tolerable. Feels his metabolism may have slowed a bit, however has also been going to a lot of events (graduation parties etc) at which he is eating outside of his normal diet. He reports nocturia x 4, increased from 2-3. Endorses decreased libido, does find he is able to have erection with cialis. Has h/o cervical stenosis/fusion and had C2-C6 nerve block yesterday.    7/3/24: LFTs 6/28 gr II elevation, abiraterone held on 7/1, continued on prednisone. Overall patient has been feeling well, he has been motivated to exercise more due to the fact that he is on ADT and wants to counteract increased calorie intake / easier weight gain. He is doing his best to push through fatigue at certain times, but also takes a naps. He continues to follow with pain management and is planned for cervical nerve ablation. Ongoing vasomotor symptoms, says that a few nights ago he was up every hour with hot flashes, this is not the norm, discussed potential gabapentin, however patient has been on this medication previously and does not wish to start right now. Notes some urinary urgency and nocturia 2-4. Endorse little to no libido, does not think he has gotten spontaneous erections.    7/24/24 reports moderate fatigue, hot flash and sweating 10 times, loss of energy. Has normal urination.   8/16/24 pt is in general well-being. Appetite is good. maintaining weight. Exercise 1-2 times a day [work-out]. Continues to endorse moderate fatigue and drinks coffee which helps keep him active and about for about 1 hour. Hot flashes is a bit less but more often. No swelling or joint pain which he follows locally for management.  9/6/24 pt is doing well. Appetite is good and maintaining weight. BM is normal. Continues to exercise 1-2 times daily. Continues to endorse moderate fatigue and mild hot flash with occ sleep interruption.   10/14/24 reports mod fatigue, fluctuating hot flash and sweating  11/5/24 - continues on abiraterone 750mg daily. Overall feeling fatigued, depressed, gaining weight. Uncle passed away, another family member in the ICU. Having episodes of crying and feeling depressed, no SI. Ongoing hot flashes. Urine flow is "fine", no urgency, nocturia 3-5x/night. Occasional BLE edema. Had the spacer placed for RT, more discomfort. Denies fever, chills, night sweats, headache, dizziness, chest pain, palpitations, SOB, cough, nausea/vomiting, diarrhea/constipation, abdominal pain, dysuria, hematuria, incontinence, bleeding, muscle or joint pain/weakness. [de-identified] : G7(4+3) disease in intraductal component, 3/8 sampled sites  [de-identified] : 11/26/24 - abiraterone discontinued early Nov 2024 d/t grade 4 transaminitis. Main issue is perineal pain since spacer gel placement. He saw Dr. Angel yesterday and started Augmentin for suspected infection. MRI done today shows an abscess between the prostate and rectum. No actual fevers but has been taking Motrin ATC with subjective fevers and shaking chills at times. Denies chest pain, palpitations, SOB, cough, nausea/vomiting, diarrhea/constipation, abdominal pain.   12/20/24 14/28 fractions on 12/16 for RT, reports mild fatigue, moderate hot flash and sweating. Nocturia 3 to 4.Completed augmentin yesterday about 3 week course for his perirectal abscess.   2/3/25 -  Denies fever, chills, night sweats, hot flashes, headache, dizziness, chest pain, palpitations, SOB, cough, nausea/vomiting, diarrhea/constipation, abdominal pain, dysuria, hematuria, incontinence, LE edema, bleeding, muscle or joint pain/weakness.

## 2025-01-27 NOTE — ASSESSMENT
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 He has at least stage FARIDA prostate adenocarcinoma (up to [place] 7 (4+3). PSMA PET 5/14/24 showed several SUV positive sub-centimeter pelvic lymph nodes. This indicates very high risk feature for his newly diagnosed prostate cancer. The plan is to proceed with radiation and anti- hormone therapy. The recommended treatment approach in this setting (node positive disease) based on the STAMPEDE trial is adding 2 year abiraterone (+prednisone) to ADT plus RT as an option for patients with very high risk prostate cancer. Patient had been started on Orgovyx on 5/20/24, he continued for two weeks and then received first dose of Lupron on 6/5/24. He started abiraterone and prednisone in June 2024.  Recommend genetic testing given he has node positive disease. Based on his FRAX score 10 years of probability of hip fracture 0.5%, a 10-year probability of a major osteoporosis-related fracture 6%. He does not require DEXA scan and bisphosphate vs prolia.   Plan: Very high risk of prostate cancer with pelvic nodes - Initial PSA was 7.96 on 5/20/24, most recently 0.1 on 12/19/24.  - Continue Lupron [id number], last given 12/4/24, next scheduled for 3/4/25.  - Per d/w Dr. Rice, given ALT >20x ULN, abiraterone has been permanently discontinued. He will continue on ADT alone at this time.  - RT to the prostate and pelvic LNs x 28 fx started 11/15/24, completed early Jan 2025 (delay in tx d/t perirectal abscess)  Katherine rectal abscess: - mild discomfort around the rectal area. COmpleted augmentin. MRI of the pelvis done today 11/26 shows a collection measuring 3.4 x 3.1 x 5.0 cm between the rectum and prostate consistent with an abscess (infection associated with the spacer gel). - Pt has subjective fevers (taking Motrin around the clock) and chills. Dr. Angel was present for a portion of this visit, pt referred to [facility] ED for abscess drainage and IV abx. I have emailed the inpatient oncology team and [facility] ED made aware via Teams.   Transaminitis -  Reviewed 8/31/24 LFT with pt/spouse noting AST-59, ALT - 94, T.B - 1.4; will continue to monitor < GR 1 - 11/4/24 AST = 467, ALT = 1,0006 - grade 4 - 11/23/24 AST = 28, ALT = 56 - Abiraterone discontinued permanently - Repeat LFTs today are pending.   Instructed to contact our office with any new/worsening symptoms. Pt educated regarding plan of care, all questions/concerns addressed to the best of my abilities and his apparent satisfaction. F/u in 6-8wks

## 2025-01-27 NOTE — HISTORY OF PRESENT ILLNESS
[de-identified] : 59 year old male who is presenting for initial medical oncology evaluation for prostate adenocarcinoma. He noted a significant rise in his PSA to 6.23 on 2/6/24 (previous 3.71 on 1/20/23 and 1.11 on 4/1/16). MRI 4/12/24 showed 4.4 x 2.9 x 4.1 cm = 27 cc gland, with dominant lesions in the left posterior medial-lateral (PZpm-pl) base-to-midgland peripheral zone (1.6 cm, KS-5), with concern for capsular involvement but no clear EPE, abutting the left NVB and SV without clear invasion, and left posterior medial (PZpm) [id number] peripheral zone lesion extending to the midline posterior urethra at the apex (0.9 cm, KS-4). No LAD. No major anatomic issues. Prostate biopsy 4/23/24 showed right posterior lateral base G 3+4 with 50% involvement, MRI target#1 left PZ G 4+3 among 3 cores with 60%, 55% and 55% involvement with intraductal component, MRI #2 target left PZ G 3+4 among 2 cores with 85% and 55% involvement with intraductal and cribriform component. (small prostate so limited number of sites per Dr. Umaña). PSMA PET 5/14/24 showed increased radiotracer activity in the left side of the prostate gland suggests intermediate PSA expression, with compatible metastatic disease indicated by 0.6x0.3cm left para-aortic/common iliac node SUV 7.0, a 5mm left common iliac node SUV 5.8, a 0.6x0.6cm left external iliac node SUV 10.1, PSA 7.96 and Testosterone on 272 5/20/24. He was started on Orgovyx by urology on 5/21/24   Interval History: Overall he has been doing well. He denies any issues with urination. No constipation or diarrhea. He reports that he is great. He remains active and goes to the gym 6 days per week, as well as, golf a couple days.    PMH/PSH: Appendectomy, hernia repair, testicular torsion repair, vasectomy, cervical spinal fusion; HTN (Edarbi, Amlodopine) /HLD (atorvastin)   Social: Never smoker, occasional alcohol use. 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 Has six children (age 31- 15 years)  Family History: Maternal Uncle with hx of bladder cancer    6/12/24: Patient is more fatigued than normal, says he that he normally goes to bed pretty early and is finding he wants to go to bed even earlier. He does note that since stopping  orgovyx and starting lupron and stephanie/pred the fatigue has actually improved. He was exhausted when taking orgovyx. He is having hot flashes throughout the day, says they are tolerable. Feels his metabolism may have slowed a bit, however has also been going to a lot of events (graduation parties etc) at which he is eating outside of his normal diet. He reports nocturia x 4, increased from 2-3. Endorses decreased libido, does find he is able to have erection with cialis. Has h/o cervical stenosis/fusion and had C2-C6 nerve block yesterday.    7/3/24: LFTs 6/28 gr II elevation, abiraterone held on 7/1, continued on prednisone. Overall patient has been feeling well, he has been motivated to exercise more due to the fact that he is on ADT and wants to counteract increased calorie intake / easier weight gain. He is doing his best to push through fatigue at certain times, but also takes a naps. He continues to follow with pain management and is planned for cervical nerve ablation. Ongoing vasomotor symptoms, says that a few nights ago he was up every hour with hot flashes, this is not the norm, discussed potential gabapentin, however patient has been on this medication previously and does not wish to start right now. Notes some urinary urgency and nocturia 2-4. Endorse little to no libido, does not think he has gotten spontaneous erections.    7/24/24 reports moderate fatigue, hot flash and sweating 10 times, loss of energy. Has normal urination.   8/16/24 pt is in general well-being. Appetite is good. maintaining weight. Exercise 1-2 times a day [work-out]. Continues to endorse moderate fatigue and drinks coffee which helps keep him active and about for about 1 hour. Hot flashes is a bit less but more often. No swelling or joint pain which he follows locally for management.  9/6/24 pt is doing well. Appetite is good and maintaining weight. BM is normal. Continues to exercise 1-2 times daily. Continues to endorse moderate fatigue and mild hot flash with occ sleep interruption.   10/14/24 reports mod fatigue, fluctuating hot flash and sweating  11/5/24 - continues on abiraterone 750mg daily. Overall feeling fatigued, depressed, gaining weight. Uncle passed away, another family member in the ICU. Having episodes of crying and feeling depressed, no SI. Ongoing hot flashes. Urine flow is "fine", no urgency, nocturia 3-5x/night. Occasional BLE edema. Had the spacer placed for RT, more discomfort. Denies fever, chills, night sweats, headache, dizziness, chest pain, palpitations, SOB, cough, nausea/vomiting, diarrhea/constipation, abdominal pain, dysuria, hematuria, incontinence, bleeding, muscle or joint pain/weakness. [de-identified] : G7(4+3) disease in intraductal component, 3/8 sampled sites  [de-identified] : 11/26/24 - abiraterone discontinued early Nov 2024 d/t grade 4 transaminitis. Main issue is perineal pain since spacer gel placement. He saw Dr. Angel yesterday and started Augmentin for suspected infection. MRI done today shows an abscess between the prostate and rectum. No actual fevers but has been taking Motrin ATC with subjective fevers and shaking chills at times. Denies chest pain, palpitations, SOB, cough, nausea/vomiting, diarrhea/constipation, abdominal pain.   12/20/24 14/28 fractions on 12/16 for RT, reports mild fatigue, moderate hot flash and sweating. Nocturia 3 to 4.Completed augmentin yesterday about 3 week course for his perirectal abscess.   2/3/25 -  Denies fever, chills, night sweats, hot flashes, headache, dizziness, chest pain, palpitations, SOB, cough, nausea/vomiting, diarrhea/constipation, abdominal pain, dysuria, hematuria, incontinence, LE edema, bleeding, muscle or joint pain/weakness.

## 2025-02-04 NOTE — HISTORY OF PRESENT ILLNESS
[FreeTextEntry1] : Mr. Maynard is a 60-year-old male who presents for post treatment evaluation appointment.    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 [place] score 4+3=7 in 1 site/3 cores, Carcinoma is seen involving adipose tissue, consistent with extraprostatic extension. Intraductal carcinoma of the prostate also present. Perineural invasion present. [place] score 3+4=7 in 2 sites/3 cores, Atypical intraductal proliferation (AIP) and Intraductal carcinoma of the prostate also present. 85% max involvement. Cribriform [place] pattern 4 is present.  PSA 6.23 ng/mL. PSMA PET with few subcentimeter retroperitoneal and left pelvic lymph nodes with increased radiotracer activity are compatible with metastatic disease.  TREATMENT: 5/21/24 - Received Orgovyx x 2 weeks  6/5/24 - started on Lupron/Eligard injections  6/2024 - 11/2024: Abiraterone/Prednisone (dose reduced due to elevated LFTs), stopped due to persistent elevated LFTs  11/16/24 - 1/8/25: Received RADIATION Therapy to Prostate/SV/Nodes, 7000 cGy in 28 fx   PSA Trend: 1/20/23 - 3.71 ng/mL 2/6/24 - 6.23 5/20/24 - 7.96           Testosterone 272.0 ng/dL  6/30/24 - 1.67 7/17/24 - 1.15 8/9/24 - 0.64 8/31/24 - 0.39           Testosterone < 2.5 9/19/24 - 0.32 10/12/24 - 0.22 11/4/24 - 0.19 11/11/24 - 0.14  11/18/24 - 0.21 *started radiation 11/16/24* 11/23/24 - 0.24  12/19/24 - 0.10 1/30/2025 - 0.02  HPI:  Mr. Maynard originally presented to urology in March 2024 for evaluation of elevated PSA of 6.23 ng/mL. MRI ordered.   4/12/24 - Prostate MRI: Prostate volume 27 cc. PSA density: 0.23 ng/mL/mL. Lesion #1, Left, posterior medial-lateral (PZpm-pl), base-to-midgland, peripheral zone (16 x 9 x 15 mm). Tumor abuts capsule causing capsular irregularity. PIRADS 5 Lesion #2, Left, posterior medial (PZpm), [id number], peripheral zone lesion extends to the midline posterior urethra at the apex (7 x 3 x 9 mm). No extraprostatic extension noted. PIRADS 4 Lesion 1 abuts the left neurovascular bundle. Lesion 1 abuts the left seminal vesicle without definite invasion. No pelvic lymphadenopathy and no suspicious bone lesions identified.    4/23/24 - underwent Prostate Biopsy: Pathology - Adenocarcinoma of Prostate, 3/8 sites and 6/11 cores involved.  Anjelica score 4+3=7 in 1 site/3 cores (MRI target lesion #1), Carcinoma is seen involving adipose tissue, consistent with extraprostatic extension. Intraductal carcinoma of the prostate also present. Perineural invasion present.  [place] score 3+4=7 in 2 sites/3 cores (MRI target lesion and right posterior lateral base), Atypical intraductal proliferation (AIP) and Intraductal carcinoma of the prostate also present. 85% max involvement.  Note: Cribriform Anjelica pattern 4 is present.    Mr. Maynard saw Dr. Umaña (urology) in follow up and biopsy results were reviewed.  Reviewed that definitive treatment would be recommended given the biopsy results.  PSMA PET ordered. Referrals to urology surgeon and radiation oncology made.  He then was seen by Dr. Pollock (urology) and Dr. Diamond ([facility]) in multidisciplinary clinic on 5/9/24, both treatment options were reviewed in detail.  Final decision about treatment to be made after PSMA PET.   5/14/24 - PSMA PET: 1. Foci of increased radiotracer activity in left side of prostate gland, extending from apex to base, corresponding to lesions identified on MRI/biopsy-proven prostate carcinoma. The intensity of radiotracer activity suggests intermediate PSMA expression. 2. Few subcentimeter retroperitoneal and left pelvic lymph nodes with increased radiotracer activity are compatible with metastatic disease.  5/22/24 - saw Dr. Rice ([facility]) in consultation. Discussed all imaging and pathology results.  He has newly diagnosed at least stage FARIDA prostate adenocarcinoma (up to Anjelica 7 (4+3). PSMA PET 5/14/24 showed several SUV positive sub-centimeter pelvic lymph nodes. This indicates very high-risk feature for his newly diagnosed prostate cancer. The plan is to proceed with radiation and anti- hormone therapy. The recommended treatment approach in this setting (node positive disease) based on the STAMPEDE trial is adding 2-year abiraterone (+prednisone) to ADT plus RT as an option for patients with very high-risk prostate cancer.    6/2024 - started on Lupron/Eligard injections and Abiraterone/Prednisone with Dr. Rice ([facility])  7/1/24 - saw Dr. Nielsen ([facility]) in consultation as Dr. Diamond had left the practice.  Recommended external beam radiation therapy to the prostate, in addition to continuing hormone therapy, based on the STAMPEDE study findings. The radiation therapy would be given for 5 weeks (25 treatments) after observing the PSA levels on hormone therapy (typically 4-6 months). Will coordinate with the radiation oncology team at the [facility] in [place] for the patient's radiation treatment.    10/29/24 - Had Prostate Fiducial Markers/SpaceOAR Gel placed by Dr. Nielsen ([facility]) in preparation for radiation therapy.    11/5/24 - presented for follow up visit prior to SIM planning scan as he is to receive his radiation at [facility].  Mr. Maynard is feeling well today, appropriately anxious about next steps. He has nocturia of 4x (wakes due to hot flashes so urinates as well), frequency, and occasional urgency. No bowel issues, last colonoscopy was within the past year.  Poor erectile function has seen Dr. Ren. He continues to follow with Dr. Rice ([facility]) and is on Lupron injections and Abiraterone/Prednisone, as per patient told to hold Abiraterone due to elevated LFTs.  He is having hot flashes, decreased energy, and some depression since starting on Lupron injections and Abiraterone/Prednisone. He is looking forward to a trip to [place] at the end of December.  Met with wife and patient to discuss radiation option. Reviewed imaging. Pelvic lymph nodes could be covered by pelvic RT field, including all PSMA positive disease. PSA down to 0.19ng/mL. We discussed options for STAMPEDE RT vs RT to pelvis and prostate. [employer] and possible acute and late side effects discussed. Opted for comprehensive RT to pelvis and prostate 28 fractions. Consent signed. Simulation today.  11/16/24 - 1/8/25: Received RADIATION Therapy to Prostate/SV/Nodes, 7000 cGy in 28 fx with ADT  *Treatment break from 11/26/24 - 12/6/24 due to hospitalization*  11/26/24 - MRI Pelvis: Collection measuring 3.4 x 3.1 x 5.0 cm between the rectum and prostate consistent with an abscess (infection associated with the spacer gel). Collection extends into the anterior rectal wall and fistulizes into the right seminal vesicle.  Admitted to [facility] from 11/26/24 - 11/28/24 after MRI results for IVAB.  Seen by urology, colorectal surgery (no surgical intervention recommended), and infectious disease.  Discharged home to continue on oral antibiotics.    12/9/24 - presented after hospitalization for IVAB prior to restarting on radiation therapy.  He had completed 8/28 fx on 11/25/24.  Mr. Maynard is feeling better, denies any chills, fever. No discharge or blood from rectum.  Still with discomfort and feeling of irritation but denies pain, remains on antibiotic at this time, due to complete at the end of the week.  Has not been taking any Tylenol or Ibuprofen. No diarrhea, stool is softer and more frequent possibly due to antibiotics.  Nocturia of 2-4x and increase in frequency, No dysuria or hematuria.   Is off Abiraterone/Prednisone since beginning of November due to elevated LFTs, last saw Dr. Rice ([facility]) on 11/26/24 next follow up on 12/20/24.  Admitted for antibiotics for rectal abscess, now been off treatment for 2 weeks. Completed 8 fractions prior to stopping RT. Now feeling much better. Only very mild symptoms in rectum. No fevers. Continuing on antibiotics. Discussed pros and cons of restarting RT vs. waiting longer for healing. Plan to restart today with regular review. He will continue ABx over the weekend as is tolerating well. He has my contact details to contact me at any time.  1/8/25 - completed RADIATION Therapy to Prostate/SV/Nodes, 7000 cGy in 28 fx with ADT   2/4/25 - presents for post treatment evaluation appointment.   He follows with Dr. Rice ([facility]) with visit on 2/3/25, remains on Eligard injections.  Also saw Dr. Nielsen for Telehealth follow up on 1/21/25.  Patient reports nocturia 3-4 times per night, frequency, hot flashes, and his rectal pain had improved. He is not sexually active at this time. He denies any other urinary or bowel issues. He had his Eligard injection in December, his next dose is in March. He is doing generally well post treatment. He has nocturia and occasional burning sensation, taking tamsulosin with some improvement in  symptoms.

## 2025-02-04 NOTE — REVIEW OF SYSTEMS
[Nocturia] : nocturia [Urinary Frequency] : urinary frequency [IPSS Score (0-40): ___] : IPSS score: [unfilled] [EPIC-CP Score (0-60): ___] : EPIC-CP score: [unfilled] [Hot Flashes] : hot flashes [Negative] : Psychiatric [Hematuria: Grade 0] : Hematuria: Grade 0 [Urinary Incontinence: Grade 0] : Urinary Incontinence: Grade 0  [Urinary Retention: Grade 0] : Urinary Retention: Grade 0 [Urinary Tract Pain: Grade 0] : Urinary Tract Pain: Grade 0 [Urinary Urgency: Grade 0] : Urinary Urgency: Grade 0 [Urinary Frequency: Grade 1 - Present] : Urinary Frequency: Grade 1 - Present [Ejaculation Disorder: Grade 1 - Diminished ejaculation] : Ejaculation Disorder: Grade 1 - Diminished ejaculation  [Erectile Dysfunction: Grade 1 - Decrease in erectile function (frequency or rigidity of erections) but intervention not indicated (e.g., medication or use of mechanical device, penile pump)] : Erectile Dysfunction: Grade 1 - Decrease in erectile function (frequency or rigidity of erections) but intervention not indicated (e.g., medication or use of mechanical device, penile pump) [FreeTextEntry8] : Q QOL - 3

## 2025-02-04 NOTE — VITALS
Patient Requesting call with results from skin that was removed and test on 6/10.  Writer has advised the caller of a callback from the nurse with 24-72 hours.    Patient Name: Logan Godfrey  Caller Name: patient  Callback Number: [phone]  Best Availability: anytime  Can A Detailed Message Be Left? yes  Additional Info: Pt stated that if he doesn't answer he gives you full permission to leave a detailed message of his results on his voicemail.  Pt stated that this is his own personal phone.   Did you confirm the message with the caller?: yes    Thank you,  Samuel Spann     [90: Able to carry normal activity; minor signs or symptoms of disease.] : 90: Able to carry normal activity; minor signs or symptoms of disease.  [ECOG Performance Status: 0 - Fully active, able to carry on all pre-disease performance without restriction] : Performance Status: 0 - Fully active, able to carry on all pre-disease performance without restriction [Maximal Pain Intensity: 0/10] : 0/10 [Least Pain Intensity: 0/10] : 0/10

## 2025-02-04 NOTE — HISTORY OF PRESENT ILLNESS
[FreeTextEntry1] : Mr. Maynard is a 60-year-old male who presents for post treatment evaluation appointment.    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 Marathon score 4+3=7 in 1 site/3 cores, Carcinoma is seen involving adipose tissue, consistent with extraprostatic extension. Intraductal carcinoma of the prostate also present. Perineural invasion present. Marathon score 3+4=7 in 2 sites/3 cores, Atypical intraductal proliferation (AIP) and Intraductal carcinoma of the prostate also present. 85% max involvement. Cribriform Marathon pattern 4 is present.  PSA 6.23 ng/mL. PSMA PET with few subcentimeter retroperitoneal and left pelvic lymph nodes with increased radiotracer activity are compatible with metastatic disease.  TREATMENT: 5/21/24 - Received Orgovyx x 2 weeks  6/5/24 - started on Lupron/Eligard injections  6/2024 - 11/2024: Abiraterone/Prednisone (dose reduced due to elevated LFTs), stopped due to persistent elevated LFTs  11/16/24 - 1/8/25: Received RADIATION Therapy to Prostate/SV/Nodes, 7000 cGy in 28 fx   PSA Trend: 1/20/23 - 3.71 ng/mL 2/6/24 - 6.23 5/20/24 - 7.96           Testosterone 272.0 ng/dL  6/30/24 - 1.67 7/17/24 - 1.15 8/9/24 - 0.64 8/31/24 - 0.39           Testosterone < 2.5 9/19/24 - 0.32 10/12/24 - 0.22 11/4/24 - 0.19 11/11/24 - 0.14  11/18/24 - 0.21 *started radiation 11/16/24* 11/23/24 - 0.24  12/19/24 - 0.10 1/30/2025 - 0.02  HPI:  Mr. Maynard originally presented to urology in March 2024 for evaluation of elevated PSA of 6.23 ng/mL. MRI ordered.   4/12/24 - Prostate MRI: Prostate volume 27 cc. PSA density: 0.23 ng/mL/mL. Lesion #1, Left, posterior medial-lateral (PZpm-pl), base-to-midgland, peripheral zone (16 x 9 x 15 mm). Tumor abuts capsule causing capsular irregularity. PIRADS 5 Lesion #2, Left, posterior medial (PZpm), [id number], peripheral zone lesion extends to the midline posterior urethra at the apex (7 x 3 x 9 mm). No extraprostatic extension noted. PIRADS 4 Lesion 1 abuts the left neurovascular bundle. Lesion 1 abuts the left seminal vesicle without definite invasion. No pelvic lymphadenopathy and no suspicious bone lesions identified.    4/23/24 - underwent Prostate Biopsy: Pathology - Adenocarcinoma of Prostate, 3/8 sites and 6/11 cores involved.  Anjelica score 4+3=7 in 1 site/3 cores (MRI target lesion #1), Carcinoma is seen involving adipose tissue, consistent with extraprostatic extension. Intraductal carcinoma of the prostate also present. Perineural invasion present.  Marathon score 3+4=7 in 2 sites/3 cores (MRI target lesion and right posterior lateral base), Atypical intraductal proliferation (AIP) and Intraductal carcinoma of the prostate also present. 85% max involvement.  Note: Cribriform Anjelica pattern 4 is present.    Mr. Maynard saw Dr. Umaña (urology) in follow up and biopsy results were reviewed.  Reviewed that definitive treatment would be recommended given the biopsy results.  PSMA PET ordered. Referrals to urology surgeon and radiation oncology made.  He then was seen by Dr. Pollock (urology) and Dr. Diamond ([facility]) in multidisciplinary clinic on 5/9/24, both treatment options were reviewed in detail.  Final decision about treatment to be made after PSMA PET.   5/14/24 - PSMA PET: 1. Foci of increased radiotracer activity in left side of prostate gland, extending from apex to base, corresponding to lesions identified on MRI/biopsy-proven prostate carcinoma. The intensity of radiotracer activity suggests intermediate PSMA expression. 2. Few subcentimeter retroperitoneal and left pelvic lymph nodes with increased radiotracer activity are compatible with metastatic disease.  5/22/24 - saw Dr. Rice ([facility]) in consultation. Discussed all imaging and pathology results.  He has newly diagnosed at least stage FARIDA prostate adenocarcinoma (up to Anjelica 7 (4+3). PSMA PET 5/14/24 showed several SUV positive sub-centimeter pelvic lymph nodes. This indicates very high-risk feature for his newly diagnosed prostate cancer. The plan is to proceed with radiation and anti- hormone therapy. The recommended treatment approach in this setting (node positive disease) based on the STAMPEDE trial is adding 2-year abiraterone (+prednisone) to ADT plus RT as an option for patients with very high-risk prostate cancer.    6/2024 - started on Lupron/Eligard injections and Abiraterone/Prednisone with Dr. Rice ([facility])  7/1/24 - saw Dr. Nielsen ([facility]) in consultation as Dr. Diamond had left the practice.  Recommended external beam radiation therapy to the prostate, in addition to continuing hormone therapy, based on the STAMPEDE study findings. The radiation therapy would be given for 5 weeks (25 treatments) after observing the PSA levels on hormone therapy (typically 4-6 months). Will coordinate with the radiation oncology team at the [facility] in [place] for the patient's radiation treatment.    10/29/24 - Had Prostate Fiducial Markers/SpaceOAR Gel placed by Dr. Nielsen ([facility]) in preparation for radiation therapy.    11/5/24 - presented for follow up visit prior to SIM planning scan as he is to receive his radiation at [facility].  Mr. Maynard is feeling well today, appropriately anxious about next steps. He has nocturia of 4x (wakes due to hot flashes so urinates as well), frequency, and occasional urgency. No bowel issues, last colonoscopy was within the past year.  Poor erectile function has seen Dr. Ren. He continues to follow with Dr. Rice ([facility]) and is on Lupron injections and Abiraterone/Prednisone, as per patient told to hold Abiraterone due to elevated LFTs.  He is having hot flashes, decreased energy, and some depression since starting on Lupron injections and Abiraterone/Prednisone. He is looking forward to a trip to [place] at the end of December.  Met with wife and patient to discuss radiation option. Reviewed imaging. Pelvic lymph nodes could be covered by pelvic RT field, including all PSMA positive disease. PSA down to 0.19ng/mL. We discussed options for STAMPEDE RT vs RT to pelvis and prostate. [employer] and possible acute and late side effects discussed. Opted for comprehensive RT to pelvis and prostate 28 fractions. Consent signed. Simulation today.  11/16/24 - 1/8/25: Received RADIATION Therapy to Prostate/SV/Nodes, 7000 cGy in 28 fx with ADT  *Treatment break from 11/26/24 - 12/6/24 due to hospitalization*  11/26/24 - MRI Pelvis: Collection measuring 3.4 x 3.1 x 5.0 cm between the rectum and prostate consistent with an abscess (infection associated with the spacer gel). Collection extends into the anterior rectal wall and fistulizes into the right seminal vesicle.  Admitted to [facility] from 11/26/24 - 11/28/24 after MRI results for IVAB.  Seen by urology, colorectal surgery (no surgical intervention recommended), and infectious disease.  Discharged home to continue on oral antibiotics.    12/9/24 - presented after hospitalization for IVAB prior to restarting on radiation therapy.  He had completed 8/28 fx on 11/25/24.  Mr. Maynard is feeling better, denies any chills, fever. No discharge or blood from rectum.  Still with discomfort and feeling of irritation but denies pain, remains on antibiotic at this time, due to complete at the end of the week.  Has not been taking any Tylenol or Ibuprofen. No diarrhea, stool is softer and more frequent possibly due to antibiotics.  Nocturia of 2-4x and increase in frequency, No dysuria or hematuria.   Is off Abiraterone/Prednisone since beginning of November due to elevated LFTs, last saw Dr. Rice ([facility]) on 11/26/24 next follow up on 12/20/24.  Admitted for antibiotics for rectal abscess, now been off treatment for 2 weeks. Completed 8 fractions prior to stopping RT. Now feeling much better. Only very mild symptoms in rectum. No fevers. Continuing on antibiotics. Discussed pros and cons of restarting RT vs. waiting longer for healing. Plan to restart today with regular review. He will continue ABx over the weekend as is tolerating well. He has my contact details to contact me at any time.  1/8/25 - completed RADIATION Therapy to Prostate/SV/Nodes, 7000 cGy in 28 fx with ADT   2/4/25 - presents for post treatment evaluation appointment.   He follows with Dr. Rice ([facility]) with visit on 2/3/25, remains on Eligard injections.  Also saw Dr. Nielsen for Telehealth follow up on 1/21/25.  Patient reports nocturia 3-4 times per night, frequency, hot flashes, and his rectal pain had improved. He is not sexually active at this time. He denies any other urinary or bowel issues. He had his Eligard injection in December, his next dose is in March. He is doing generally well post treatment. He has nocturia and occasional burning sensation, taking tamsulosin with some improvement in  symptoms.

## 2025-02-04 NOTE — DISEASE MANAGEMENT
[BiopsyDate] : 04/23/2024 [MeasuredProstateVolume] : 27 [TotalCores] : 11 [TotalPositiveCores] : 6 [MaxCoreInvolvement] : 85 [RadiationCompletedDate] : 1/8/25 [EBRTDose] : 7000cGy [EBRTFractions] : 28 [ADTStartedDate] : 6/2024 [TTNM] : 3a [NTNM] : 1 [MTNM] : 0

## 2025-02-06 NOTE — REVIEW OF SYSTEMS
[Fever] : no fever [Chills] : no chills [Night Sweats] : no night sweats [Fatigue] : no fatigue [Chest Pain] : no chest pain [Palpitations] : no palpitations [Lower Ext Edema] : no lower extremity edema [Shortness Of Breath] : no shortness of breath [Cough] : no cough [Abdominal Pain] : no abdominal pain [Vomiting] : no vomiting [Constipation] : no constipation [Diarrhea: Grade 0] : Diarrhea: Grade 0 [Dysuria] : no dysuria [Incontinence] : no incontinence [Joint Pain] : no joint pain [Joint Stiffness] : no joint stiffness [Muscle Pain] : no muscle pain [Muscle Weakness] : no muscle weakness [Dizziness] : no dizziness [Hot Flashes] : hot flashes [Easy Bleeding] : no tendency for easy bleeding [Easy Bruising] : no tendency for easy bruising

## 2025-02-06 NOTE — HISTORY OF PRESENT ILLNESS
[de-identified] : 59 year old male who is presenting for initial medical oncology evaluation for prostate adenocarcinoma. He noted a significant rise in his PSA to 6.23 on 2/6/24 (previous 3.71 on 1/20/23 and 1.11 on 4/1/16). MRI 4/12/24 showed 4.4 x 2.9 x 4.1 cm = 27 cc gland, with dominant lesions in the left posterior medial-lateral (PZpm-pl) base-to-midgland peripheral zone (1.6 cm, AK-5), with concern for capsular involvement but no clear EPE, abutting the left NVB and SV without clear invasion, and left posterior medial (PZpm) [id number]-[id number] peripheral zone lesion extending to the midline posterior urethra at the apex (0.9 cm, AK-4). No LAD. No major anatomic issues. Prostate biopsy 4/23/24 showed right posterior lateral base G 3+4 with 50% involvement, MRI target#1 left PZ G 4+3 among 3 cores with 60%, 55% and 55% involvement with intraductal component, MRI #2 target left PZ G 3+4 among 2 cores with 85% and 55% involvement with intraductal and cribriform component. (small prostate so limited number of sites per Dr. Umaña). PSMA PET 5/14/24 showed increased radiotracer activity in the left side of the prostate gland suggests intermediate PSA expression, with compatible metastatic disease indicated by 0.6x0.3cm left para-aortic/common iliac node SUV 7.0, a 5mm left common iliac node SUV 5.8, a 0.6x0.6cm left external iliac node SUV 10.1, PSA 7.96 and Testosterone on 272 5/20/24. He was started on Orgovyx by urology on 5/21/24   Interval History: Overall he has been doing well. He denies any issues with urination. No constipation or diarrhea. He reports that he is great. He remains active and goes to the gym 6 days per week, as well as, golf a couple days.    PMH/PSH: Appendectomy, hernia repair, testicular torsion repair, vasectomy, cervical spinal fusion; HTN (Edarbi, Amlodopine) /HLD (atorvastin)   Social: Never smoker, occasional alcohol use. 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 Has six children (age 31- 15 years)  Family History: Maternal Uncle with hx of bladder cancer    6/12/24: Patient is more fatigued than normal, says he that he normally goes to bed pretty early and is finding he wants to go to bed even earlier. He does note that since stopping  orgovyx and starting lupron and stephanie/pred the fatigue has actually improved. He was exhausted when taking orgovyx. He is having hot flashes throughout the day, says they are tolerable. Feels his metabolism may have slowed a bit, however has also been going to a lot of events (graduation parties etc) at which he is eating outside of his normal diet. He reports nocturia x 4, increased from 2-3. Endorses decreased libido, does find he is able to have erection with cialis. Has h/o cervical stenosis/fusion and had C2-C6 nerve block yesterday.    7/3/24: LFTs 6/28 gr II elevation, abiraterone held on 7/1, continued on prednisone. Overall patient has been feeling well, he has been motivated to exercise more due to the fact that he is on ADT and wants to counteract increased calorie intake / easier weight gain. He is doing his best to push through fatigue at certain times, but also takes a naps. He continues to follow with pain management and is planned for cervical nerve ablation. Ongoing vasomotor symptoms, says that a few nights ago he was up every hour with hot flashes, this is not the norm, discussed potential gabapentin, however patient has been on this medication previously and does not wish to start right now. Notes some urinary urgency and nocturia 2-4. Endorse little to no libido, does not think he has gotten spontaneous erections.    7/24/24 reports moderate fatigue, hot flash and sweating 10 times, loss of energy. Has normal urination.   8/16/24 pt is in general well-being. Appetite is good. maintaining weight. Exercise 1-2 times a day [work-out]. Continues to endorse moderate fatigue and drinks coffee which helps keep him active and about for about 1 hour. Hot flashes is a bit less but more often. No swelling or joint pain which he follows locally for management.  9/6/24 pt is doing well. Appetite is good and maintaining weight. BM is normal. Continues to exercise 1-2 times daily. Continues to endorse moderate fatigue and mild hot flash with occ sleep interruption.   10/14/24 reports mod fatigue, fluctuating hot flash and sweating  11/5/24 - continues on abiraterone 750mg daily. Overall feeling fatigued, depressed, gaining weight. Uncle passed away, another family member in the ICU. Having episodes of crying and feeling depressed, no SI. Ongoing hot flashes. Urine flow is "fine", no urgency, nocturia 3-5x/night. Occasional BLE edema. Had the spacer placed for RT, more discomfort. Denies fever, chills, night sweats, headache, dizziness, chest pain, palpitations, SOB, cough, nausea/vomiting, diarrhea/constipation, abdominal pain, dysuria, hematuria, incontinence, bleeding, muscle or joint pain/weakness. [de-identified] : G7(4+3) disease in intraductal component, 3/8 sampled sites  [de-identified] : 11/26/24 - abiraterone discontinued early Nov 2024 d/t grade 4 transaminitis. Main issue is perineal pain since spacer gel placement. He saw Dr. Angel yesterday and started Augmentin for suspected infection. MRI done today shows an abscess between the prostate and rectum. No actual fevers but has been taking Motrin ATC with subjective fevers and shaking chills at times. Denies chest pain, palpitations, SOB, cough, nausea/vomiting, diarrhea/constipation, abdominal pain.   12/20/24 14/28 fractions on 12/16 for RT, reports mild fatigue, moderate hot flash and sweating. Nocturia 3 to 4.Completed augmentin yesterday about 3 week course for his perirectal abscess.   2/6/25 - RT completed 1/8/25. Feeling " a lot better". No fatigue, feeling more like himself. Ongoing hot flashes. Urine flow is "fine", no urgency, nocturia 4-5x/night. Occasional perineal "twinges". Denies fever, chills, night sweats, headache, dizziness, chest pain, palpitations, SOB, cough, nausea/vomiting, diarrhea/constipation, abdominal pain, dysuria, hematuria, incontinence, LE edema, bleeding, muscle or joint pain/weakness.

## 2025-02-06 NOTE — REASON FOR VISIT
[Home] : at home, [unfilled] , at the time of the visit. [Medical Office: ([facility])___] : at the medical office located in  [Patient] : the patient [Self] : self [FreeTextEntry2] : Prostate Cancer

## 2025-02-06 NOTE — ASSESSMENT
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 He has at least stage FARIDA prostate adenocarcinoma (up to Odon 7 (4+3). PSMA PET 5/14/24 showed several SUV positive sub-centimeter pelvic lymph nodes. This indicates very high risk feature for his newly diagnosed prostate cancer. The plan is to proceed with radiation and anti- hormone therapy. The recommended treatment approach in this setting (node positive disease) based on the STAMPEDE trial is adding 2 year abiraterone (+prednisone) to ADT plus RT as an option for patients with very high risk prostate cancer. Patient had been started on Orgovyx on 5/20/24, he continued for two weeks and then received first dose of Lupron on 6/5/24. He started abiraterone and prednisone in June 2024.  Recommend genetic testing given he has node positive disease. Based on his FRAX score 10 years of probability of hip fracture 0.5%, a 10-year probability of a major osteoporosis-related fracture 6%. He does not require DEXA scan and bisphosphate vs prolia.   Plan: Very high risk of prostate cancer with pelvic nodes - Initial PSA was 7.96 on 5/20/24, most recently 0.02 on 1/30/25 - Continue Lupron [id number], last given 12/4/24, next scheduled for 3/4/25.  - Per d/w Dr. Rice, given ALT >20x ULN, abiraterone has been permanently discontinued. He will continue on ADT alone at this time.  - RT to the prostate and pelvic LNs x 28 fx started 11/15/24, completed 1/8/25 (delay during tx d/t perirectal abscess)  Katherine rectal abscess: - 11/26/24 MRI pelvis shows a collection measuring 3.4 x 3.1 x 5.0 cm between the rectum and prostate consistent with an abscess (infection associated with the spacer gel). Pt admitted to [facility] for management, treated with 3wks of oral abx. - Resolved  Transaminitis -  Reviewed 8/31/24 LFT with pt/spouse noting AST-59, ALT - 94, T.B - 1.4; will continue to monitor < GR 1 - 11/4/24 AST = 467, ALT = 1,0006 - grade 4 - 11/23/24 AST = 28, ALT = 56 - Abiraterone discontinued permanently, LFTs have since normalized.   Instructed to contact our office with any new/worsening symptoms. Pt educated regarding plan of care, all questions/concerns addressed to the best of my abilities and his apparent satisfaction. TEB in 2mo, thereafter we will coordinate f/u visits to coincide with Lupron inj every 3mo.

## 2025-02-06 NOTE — PHYSICAL EXAM
[Restricted in physically strenuous activity but ambulatory and able to carry out work of a light or sedentary nature] : Status 1- Restricted in physically strenuous activity but ambulatory and able to carry out work of a light or sedentary nature, e.g., light house work, office work [Normal] : affect appropriate [de-identified] : anicteric

## 2025-02-25 NOTE — HISTORY OF PRESENT ILLNESS
[FreeTextEntry1] : fuv-telehealth    This visit was provided via telehealth using real-time 2-way audio visual technology. The patient, GARO GARDUNO was located at home in [place] at the time of the visit. The provider, DR. Marie Mohr, was located at [place], [place] at the time of the visit. The patient and Provider participated in the telehealth encounter. Verbal consent for telehealth services was given  by the patient. [de-identified] : GARO GARDUNO is a 59 yo man with hypertension, hypercholesterolemia, neck pain, prostate cancer that requested a telehealth for alisia ankle swelling for the past week, improved from last week.  Was in [place], had more salt and it was warm.  Bilateral ankle swelling has improved.  Taking amlodipine regularly.  Was given furosemide by oncology team recently.  Labs today, stable creatinine.  He has been well overall.  Denies redness, chest pain, sob.  Feeling otherwise well  Colonoscopy utd 2024 dr hurd. Derm utd.   The patient is  with 6 children. He is an executive of a pharmaceutical company.  He exercises regularly without difficulty.

## 2025-02-25 NOTE — PHYSICAL EXAM
[No Acute Distress] : no acute distress [Well Nourished] : well nourished [Well Developed] : well developed [Well-Appearing] : well-appearing [No Respiratory Distress] : no respiratory distress  [Alert and Oriented x3] : oriented to person, place, and time [de-identified] : camera not clear but some alisia ankle edema seen.  no redness

## 2025-02-25 NOTE — PHYSICAL EXAM
[General Appearance - Well Developed] : well developed [General Appearance - Well Nourished] : well nourished [Musculoskeletal - Swelling] : no joint swelling [No Focal Deficits] : no focal deficits [Oriented To Time, Place, And Person] : oriented to person, place, and time [Normal] : no palpable adenopathy [de-identified] : b/l Pitting pedal edema of both legs L>R  upto mid calf [de-identified] : bilateral lower extremity pitting edema L>R

## 2025-02-25 NOTE — REVIEW OF SYSTEMS
[Lower Ext Edema] : lower extremity edema [IPSS Score (0-40): ___] : IPSS score: [unfilled] [EPIC-CP Score (0-60): ___] : EPIC-CP score: [unfilled] [Negative] : Psychiatric [Constipation: Grade 0] : Constipation: Grade 0 [Diarrhea: Grade 0] : Diarrhea: Grade 0 [Fecal Incontinence: Grade 0] : Fecal Incontinence: Grade 0 [Nausea: Grade 0] : Nausea: Grade 0 [Hematuria: Grade 0] : Hematuria: Grade 0 [Urinary Incontinence: Grade 0] : Urinary Incontinence: Grade 0  [Urinary Retention: Grade 0] : Urinary Retention: Grade 0 [Urinary Tract Pain: Grade 0] : Urinary Tract Pain: Grade 0 [Urinary Urgency: Grade 1 - Present] : Urinary Urgency: Grade 1 - Present [Urinary Frequency: Grade 1 - Present] : Urinary Frequency: Grade 1 - Present [Erectile Dysfunction: Grade 2 - Decrease in erectile function (frequency/rigidity of erections), erectile intervention indicated, (e.g., medication or mechanical devices such as penile pump)] : Erectile Dysfunction: Grade 2 - Decrease in erectile function (frequency/rigidity of erections), erectile intervention indicated, (e.g., medication or mechanical devices such as penile pump) [Ejaculation Disorder: Grade 2 - Anejaculation or retrograde ejaculation] : Ejaculation Disorder: Grade 2 - Anejaculation or retrograde ejaculation [Chest Pain] : no chest pain [Shortness Of Breath] : no shortness of breath [Wheezing] : no wheezing [Cough] : no cough [SOB on Exertion] : no shortness of breath during exertion [FreeTextEntry5] : b/l pitting pedal edema

## 2025-02-25 NOTE — DISEASE MANAGEMENT
[0-10] : 0 -10 ng/mL [Biopsy with Fusion] : Patient had a biopsy with fusion on [7(3+4)] : Template Biopsy [place] Score: 7(3+4) [7(4+3)] : Fusion Biopsy Redig Score: 7(4+3) [] : Patient had a Prostate MRI [5] : 5 [FARIDA] : FARIDA [Radiation Therapy] : Radiation Therapy [Androgen Ablation] : Androgen Ablation [Treatment with radiation therapy] : Treatment with radiation therapy [EBRT] : EBRT [Treatment with androgen ablation] : Treatment with androgen ablation [Pathological] : TNM Stage: p [BiopsyDate] : 04/23/2024 [MeasuredProstateVolume] : 27 [TotalCores] : 11 [TotalPositiveCores] : 6 [MaxCoreInvolvement] : 85 [EBRTDose] : 7000cGy [RadiationCompletedDate] : 1/8/25 [EBRTFractions] : 28 [ADTStartedDate] : 6/2024 [TTNM] : 3a [NTNM] : 1 [MTNM] : 0

## 2025-03-14 NOTE — HISTORY OF PRESENT ILLNESS
[FreeTextEntry1] : fuv     [de-identified] : GARO GARDUNO is a 61 yo man with hypertension, hypercholesterolemia, neck pain, prostate cancer here for alisia ankle swelling and calf swelling for the past few weeks.  Improved with lasix.  Doppler negative.  Will decrease amlodipine, restart lasix and recheck swelling shortly.    He has been well overall.  Denies redness, chest pain, sob.  Feeling otherwise well  Colonoscopy utd 2024 dr hurd. Derm utd.   Had echo last year, will retrieve report  The patient is  with 6 children. He is an executive of a pharmaceutical company.  He usually exercises regularly without difficulty.

## 2025-03-14 NOTE — PHYSICAL EXAM
[No Acute Distress] : no acute distress [Well Nourished] : well nourished [Well Developed] : well developed [Well-Appearing] : well-appearing [No Lymphadenopathy] : no lymphadenopathy [No Respiratory Distress] : no respiratory distress  [No Accessory Muscle Use] : no accessory muscle use [Clear to Auscultation] : lungs were clear to auscultation bilaterally [Normal Rate] : normal rate  [Regular Rhythm] : with a regular rhythm [Normal S1, S2] : normal S1 and S2 [Normal Gait] : normal gait [Alert and Oriented x3] : oriented to person, place, and time [de-identified] : alisia calf swelling, slightly worse on left

## 2025-03-20 NOTE — PHYSICAL EXAM
[2+] : left 2+ [Ankle Swelling (On Exam)] : present [Ankle Swelling Bilaterally] : bilaterally  [Ankle Swelling On The Left] : moderate [Varicose Veins Of Lower Extremities] : not present [] : not present [Calm] : calm [de-identified] : Well-appearing  [de-identified] : EOMI, anicteric  [FreeTextEntry1] : positive stemmer's sign bilaterally [de-identified] : soft, nt, nd  [de-identified] : moves all extremities  [de-identified] : no wounds on bilateral feet [de-identified] : A&Ox4

## 2025-03-20 NOTE — HISTORY OF PRESENT ILLNESS
[FreeTextEntry1] : GARO GARDUNO is a 60 year old male who presents for evaluation of leg swelling.   Referred by Dr. Mohr.   Patient states that for the past month, he has been having increasing bilateral foot swelling. This is worse on the left foot and is starting to affect his calf up to the knee. Prior to this, had intermittent ankle swelling that would resolve. With the swelling, has noticed pain in the legs with heaviness and tightness.  Patient has recently completed radiation for prostate cancer in January 2025 and remains on hormone therapy. Due to this, reports fatigue, hot flashes. Hands also feel swollen as well. Reports history of trigger finger s/p surgery and injections. Legs are not as swollen when he first wakes up. He has spoken to his radiation oncologist regarding the swelling and she is considering a CT evaluation.  Dr. Mohr has advised the patient to decrease the dose of amlodipine and has also prescribe furosemide, which he has started doing only a few days ago. He has a follow up with Dr. Us next week.  Personal history of DVT - None Family history of DVT - None Family history of venous insufficiency or varicose veins - Both parents had leg swelling, mother had varicose veins and underwent surgery Current compression stocking usage - No Works as  for pharmaceutical company, [employer].   + PMH: prostate cancer, HTN, HLD + PSH: cervical fusion, appendectomy, adenoidectomy, hernia repair + FH: denies + SH: never smoker, 5-6 etoh drinks per week, no illicit substance use  + Aller: NKDA  PCP is Dr. Marie Mohr.  Med Oncologist is Dr. Princess Rice.  Rad Oncologist is Dr. June Angel.

## 2025-03-20 NOTE — DATA REVIEWED
[FreeTextEntry1] : 3/19/2025 - BLE venous duplex Negative for DVT, SVT, venous insufficiency bilaterally.

## 2025-03-20 NOTE — CONSULT LETTER
[Dear  ___] : Dear  [unfilled], [Consult Letter:] : I had the pleasure of evaluating your patient, [unfilled]. [Please see my note below.] : Please see my note below. [Consult Closing:] : Thank you very much for allowing me to participate in the care of this patient.  If you have any questions, please do not hesitate to contact me. [Sincerely,] : Sincerely, [FreeTextEntry1] : He presented to me for evaluation of worsening lower extremity swelling. Venous reflux study performed today was reviewed with the patient. There is no evidence of deep venous thrombosis, superficial venous thrombophlebitis, and venous insufficiency bilaterally. The etiology of his leg swelling does not appear to be secondary to venous insufficiency.  Physical exam findings are suggestive of lymphedema with bilateral positive Stemmer's sign. I agree with further evaluation with CT pelvis with IV contrast to evaluate for any bulky lymphadenopathy or other compressive etiologies for his leg swelling.  For symptom management, I recommended the use of compression stockings (20-30 mmHg, prescription provided), leg elevation, and ambulation.  [FreeTextEntry3] :     Selene Carranza MD, FACS, RPVI Division of Vascular & Endovascular Surgery [facility], Department of Surgery

## 2025-03-20 NOTE — PHYSICAL EXAM
[2+] : left 2+ [Ankle Swelling (On Exam)] : present [Ankle Swelling Bilaterally] : bilaterally  [Ankle Swelling On The Left] : moderate [Varicose Veins Of Lower Extremities] : not present [] : not present [Calm] : calm [de-identified] : Well-appearing  [de-identified] : EOMI, anicteric  [FreeTextEntry1] : positive stemmer's sign bilaterally [de-identified] : soft, nt, nd  [de-identified] : moves all extremities  [de-identified] : no wounds on bilateral feet [de-identified] : A&Ox4

## 2025-03-20 NOTE — ASSESSMENT
[FreeTextEntry1] : GARO GARDUNO is a 60 year old male presents for evaluation.   > Leg swelling - Venous reflux study performed today was reviewed with the patient. There is no evidence of deep venous thrombosis, superficial venous thrombophlebitis, and venous insufficiency bilaterally.  - The etiology of his leg swelling does not appear to be secondary to venous insufficiency.  - Physical exam findings are suggestive of lymphedema with bilateral positive Stemmer's sign. I agree with further evaluation with CT pelvis with IV contrast to evaluate for any bulky lymphadenopathy or other compressive etiologies for his leg swelling.  - For symptom management, I recommended the use of compression stockings (20-30 mmHg, prescription provided), leg elevation, and ambulation.  - Note send to referring doctor, Dr. Mohr.

## 2025-03-20 NOTE — CONSULT LETTER
[Dear  ___] : Dear  [unfilled], [Consult Letter:] : I had the pleasure of evaluating your patient, [unfilled]. [Please see my note below.] : Please see my note below. [Consult Closing:] : Thank you very much for allowing me to participate in the care of this patient.  If you have any questions, please do not hesitate to contact me. [Sincerely,] : Sincerely, [FreeTextEntry1] : He presented to me for evaluation of worsening lower extremity swelling. Venous reflux study performed today was reviewed with the patient. There is no evidence of deep venous thrombosis, superficial venous thrombophlebitis, and venous insufficiency bilaterally. The etiology of his leg swelling does not appear to be secondary to venous insufficiency.  Physical exam findings are suggestive of lymphedema with bilateral positive Stemmer's sign. I agree with further evaluation with CT pelvis with IV contrast to evaluate for any bulky lymphadenopathy or other compressive etiologies for his leg swelling.  For symptom management, I recommended the use of compression stockings (20-30 mmHg, prescription provided), leg elevation, and ambulation.  [FreeTextEntry3] :     Selene Carranza MD, FACS, RPVI Division of Vascular & Endovascular Surgery [facility], Department of Surgery

## 2025-03-20 NOTE — HISTORY OF PRESENT ILLNESS
[FreeTextEntry1] : GARO GARDUNO is a 60 year old male who presents for evaluation of leg swelling.   Referred by Dr. Mohr.   Patient states that for the past month, he has been having increasing bilateral foot swelling. This is worse on the left foot and is starting to affect his calf up to the knee. Prior to this, had intermittent ankle swelling that would resolve. With the swelling, has noticed pain in the legs with heaviness and tightness.  Patient has recently completed radiation for prostate cancer in January 2025 and remains on hormone therapy. Due to this, reports fatigue, hot flashes. Hands also feel swollen as well. Reports history of trigger finger s/p surgery and injections. Legs are not as swollen when he first wakes up. He has spoken to his radiation oncologist regarding the swelling and she is considering a CT evaluation.  Dr. Mohr has advised the patient to decrease the dose of amlodipine and has also prescribe furosemide, which he has started doing only a few days ago. He has a follow up with Dr. Us next week.  Personal history of DVT - None Family history of DVT - None Family history of venous insufficiency or varicose veins - Both parents had leg swelling, mother had varicose veins and underwent surgery Current compression stocking usage - No Works as  for pharmaceutical company, [employer].   + PMH: prostate cancer, HTN, HLD + PSH: cervical fusion, appendectomy, adenoidectomy, hernia repair + FH: denies + SH: never smoker, 5-6 etoh drinks per week, no illicit substance use  + Aller: NKDA  PCP is Dr. Marie Mohr.  Med Oncologist is Dr. Princess Rice.  Rad Oncologist is Dr. June Angel.

## 2025-03-28 NOTE — HISTORY OF PRESENT ILLNESS
[FreeTextEntry1] : fuv     [de-identified] : GARO GARDUNO is a 61 yo man with hypertension, hypercholesterolemia, neck pain, prostate cancer here for alisia ankle swelling and calf swelling and bp check.  BP is stable on amlodipine and candesartan.  Ankle swelling has resolved.  He has been well overall.  Denies redness, chest pain, sob.  Feeling otherwise well  Colonoscopy utd 2024 dr hurd. Derm utd.     The patient is  with 6 children. He is an executive of a pharmaceutical company.  He usually exercises regularly without difficulty.

## 2025-03-29 NOTE — HISTORY OF PRESENT ILLNESS
[Disease: _____________________] : Disease: [unfilled] [T: ___] : T[unfilled] [N: ___] : N[unfilled] [AJCC Stage: ____] : AJCC Stage: [unfilled] [de-identified] : 59 year old male who is presenting for initial medical oncology evaluation for prostate adenocarcinoma. He noted a significant rise in his PSA to 6.23 on 2/6/24 (previous 3.71 on 1/20/23 and 1.11 on 4/1/16). MRI 4/12/24 showed 4.4 x 2.9 x 4.1 cm = 27 cc gland, with dominant lesions in the left posterior medial-lateral (PZpm-pl) base-to-midgland peripheral zone (1.6 cm, SC-5), with concern for capsular involvement but no clear EPE, abutting the left NVB and SV without clear invasion, and left posterior medial (PZpm) [id number] peripheral zone lesion extending to the midline posterior urethra at the apex (0.9 cm, SC-4). No LAD. No major anatomic issues. Prostate biopsy 4/23/24 showed right posterior lateral base G 3+4 with 50% involvement, MRI target#1 left PZ G 4+3 among 3 cores with 60%, 55% and 55% involvement with intraductal component, MRI #2 target left PZ G 3+4 among 2 cores with 85% and 55% involvement with intraductal and cribriform component. (small prostate so limited number of sites per Dr. Umaña). PSMA PET 5/14/24 showed increased radiotracer activity in the left side of the prostate gland suggests intermediate PSA expression, with compatible metastatic disease indicated by 0.6x0.3cm left para-aortic/common iliac node SUV 7.0, a 5mm left common iliac node SUV 5.8, a 0.6x0.6cm left external iliac node SUV 10.1, PSA 7.96 and Testosterone on 272 5/20/24. He was started on Orgovyx by urology on 5/21/24   Interval History: Overall he has been doing well. He denies any issues with urination. No constipation or diarrhea. He reports that he is great. He remains active and goes to the gym 6 days per week, as well as, golf a couple days.    PMH/PSH: Appendectomy, hernia repair, testicular torsion repair, vasectomy, cervical spinal fusion; HTN (Edarbi, Amlodopine) /HLD (atorvastin)   Social: Never smoker, occasional alcohol use. 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 Has six children (age 31- 15 years)  Family History: Maternal Uncle with hx of bladder cancer    6/12/24: Patient is more fatigued than normal, says he that he normally goes to bed pretty early and is finding he wants to go to bed even earlier. He does note that since stopping  orgovyx and starting lupron and stephanie/pred the fatigue has actually improved. He was exhausted when taking orgovyx. He is having hot flashes throughout the day, says they are tolerable. Feels his metabolism may have slowed a bit, however has also been going to a lot of events (graduation parties etc) at which he is eating outside of his normal diet. He reports nocturia x 4, increased from 2-3. Endorses decreased libido, does find he is able to have erection with cialis. Has h/o cervical stenosis/fusion and had C2-C6 nerve block yesterday.    7/3/24: LFTs 6/28 gr II elevation, abiraterone held on 7/1, continued on prednisone. Overall patient has been feeling well, he has been motivated to exercise more due to the fact that he is on ADT and wants to counteract increased calorie intake / easier weight gain. He is doing his best to push through fatigue at certain times, but also takes a naps. He continues to follow with pain management and is planned for cervical nerve ablation. Ongoing vasomotor symptoms, says that a few nights ago he was up every hour with hot flashes, this is not the norm, discussed potential gabapentin, however patient has been on this medication previously and does not wish to start right now. Notes some urinary urgency and nocturia 2-4. Endorse little to no libido, does not think he has gotten spontaneous erections.    7/24/24 reports moderate fatigue, hot flash and sweating 10 times, loss of energy. Has normal urination.   8/16/24 pt is in general well-being. Appetite is good. maintaining weight. Exercise 1-2 times a day [work-out]. Continues to endorse moderate fatigue and drinks coffee which helps keep him active and about for about 1 hour. Hot flashes is a bit less but more often. No swelling or joint pain which he follows locally for management.  9/6/24 pt is doing well. Appetite is good and maintaining weight. BM is normal. Continues to exercise 1-2 times daily. Continues to endorse moderate fatigue and mild hot flash with occ sleep interruption.   10/14/24 reports mod fatigue, fluctuating hot flash and sweating  11/5/24 - continues on abiraterone 750mg daily. Overall feeling fatigued, depressed, gaining weight. Uncle passed away, another family member in the ICU. Having episodes of crying and feeling depressed, no SI. Ongoing hot flashes. Urine flow is "fine", no urgency, nocturia 3-5x/night. Occasional BLE edema. Had the spacer placed for RT, more discomfort. Denies fever, chills, night sweats, headache, dizziness, chest pain, palpitations, SOB, cough, nausea/vomiting, diarrhea/constipation, abdominal pain, dysuria, hematuria, incontinence, bleeding, muscle or joint pain/weakness. [de-identified] : G7(4+3) disease in intraductal component, 3/8 sampled sites  [de-identified] : 11/26/24 - abiraterone discontinued early Nov 2024 d/t grade 4 transaminitis. Main issue is perineal pain since spacer gel placement. He saw Dr. Angel yesterday and started Augmentin for suspected infection. MRI done today shows an abscess between the prostate and rectum. No actual fevers but has been taking Motrin ATC with subjective fevers and shaking chills at times. Denies chest pain, palpitations, SOB, cough, nausea/vomiting, diarrhea/constipation, abdominal pain.   12/20/24 14/28 fractions on 12/16 for RT, reports mild fatigue, moderate hot flash and sweating. Nocturia 3 to 4.Completed augmentin yesterday about 3 week course for his perirectal abscess.   2/6/25 - RT completed 1/8/25. Feeling " a lot better". No fatigue, feeling more like himself. Ongoing hot flashes. Urine flow is "fine", no urgency, nocturia 4-5x/night. Occasional perineal "twinges".   4/7/25 -

## 2025-03-29 NOTE — ASSESSMENT
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 He has at least stage FARIDA prostate adenocarcinoma (up to [place] 7 (4+3). PSMA PET 5/14/24 showed several SUV positive sub-centimeter pelvic lymph nodes. This indicates very high risk feature for his newly diagnosed prostate cancer. The plan is to proceed with radiation and anti- hormone therapy. The recommended treatment approach in this setting (node positive disease) based on the STAMPEDE trial is adding 2 year abiraterone (+prednisone) to ADT plus RT as an option for patients with very high risk prostate cancer. Patient had been started on Orgovyx on 5/20/24, he continued for two weeks and then received first dose of Lupron on 6/5/24. He started abiraterone and prednisone in June 2024, discontinued in Nov 2024 d/t grade 4 transaminitis.   Plan: Very high risk of prostate cancer with pelvic nodes - Initial PSA was 7.96 on 5/20/24, most recently 0.02 on 1/30/25 and again on 2/25/25.  - Continue Lupron [id number], last given 3/4/25, next scheduled for 6/4/25.  - Per d/w Dr. Rice, given ALT >20x ULN, abiraterone has been permanently discontinued. He will continue on ADT alone at this time.  - RT to the prostate and pelvic LNs x 28 fx started 11/15/24, completed 1/8/25 (delay during tx d/t perirectal abscess)  Katherine rectal abscess: - 11/26/24 MRI pelvis shows a collection measuring 3.4 x 3.1 x 5.0 cm between the rectum and prostate consistent with an abscess (infection associated with the spacer gel). Pt admitted to [facility] for management, treated with 3wks of oral abx. - Resolved  Transaminitis -  Reviewed 8/31/24 LFT with pt/spouse noting AST-59, ALT - 94, T.B - 1.4; will continue to monitor < GR 1 - 11/4/24 AST = 467, ALT = 1,0006 - grade 4 - Abiraterone discontinued permanently, LFTs have since normalized.   Instructed to contact our office with any new/worsening symptoms. Pt educated regarding plan of care, all questions/concerns addressed to the best of my abilities and his apparent satisfaction. RTC 2mo [Future Reassessment of Pain Scale] : Future reassessment of pain scale    [Medication(s)] : Medication(s)

## 2025-04-07 NOTE — PHYSICAL EXAM
[Fully active, able to carry on all pre-disease performance without restriction] : Status 0 - Fully active, able to carry on all pre-disease performance without restriction [Normal] : affect appropriate [de-identified] : anicteric

## 2025-04-07 NOTE — HISTORY OF PRESENT ILLNESS
[de-identified] : 59 year old male who is presenting for initial medical oncology evaluation for prostate adenocarcinoma. He noted a significant rise in his PSA to 6.23 on 2/6/24 (previous 3.71 on 1/20/23 and 1.11 on 4/1/16). MRI 4/12/24 showed 4.4 x 2.9 x 4.1 cm = 27 cc gland, with dominant lesions in the left posterior medial-lateral (PZpm-pl) base-to-midgland peripheral zone (1.6 cm, OK-5), with concern for capsular involvement but no clear EPE, abutting the left NVB and SV without clear invasion, and left posterior medial (PZpm) [id number] peripheral zone lesion extending to the midline posterior urethra at the apex (0.9 cm, OK-4). No LAD. No major anatomic issues. Prostate biopsy 4/23/24 showed right posterior lateral base G 3+4 with 50% involvement, MRI target#1 left PZ G 4+3 among 3 cores with 60%, 55% and 55% involvement with intraductal component, MRI #2 target left PZ G 3+4 among 2 cores with 85% and 55% involvement with intraductal and cribriform component. (small prostate so limited number of sites per Dr. Umaña). PSMA PET 5/14/24 showed increased radiotracer activity in the left side of the prostate gland suggests intermediate PSA expression, with compatible metastatic disease indicated by 0.6x0.3cm left para-aortic/common iliac node SUV 7.0, a 5mm left common iliac node SUV 5.8, a 0.6x0.6cm left external iliac node SUV 10.1, PSA 7.96 and Testosterone on 272 5/20/24. He was started on Orgovyx by urology on 5/21/24   Interval History: Overall he has been doing well. He denies any issues with urination. No constipation or diarrhea. He reports that he is great. He remains active and goes to the gym 6 days per week, as well as, golf a couple days.    PMH/PSH: Appendectomy, hernia repair, testicular torsion repair, vasectomy, cervical spinal fusion; HTN (Edarbi, Amlodopine) /HLD (atorvastin)   Social: Never smoker, occasional alcohol use. 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 Has six children (age 31- 15 years)  Family History: Maternal Uncle with hx of bladder cancer    6/12/24: Patient is more fatigued than normal, says he that he normally goes to bed pretty early and is finding he wants to go to bed even earlier. He does note that since stopping  orgovyx and starting lupron and stephanie/pred the fatigue has actually improved. He was exhausted when taking orgovyx. He is having hot flashes throughout the day, says they are tolerable. Feels his metabolism may have slowed a bit, however has also been going to a lot of events (graduation parties etc) at which he is eating outside of his normal diet. He reports nocturia x 4, increased from 2-3. Endorses decreased libido, does find he is able to have erection with cialis. Has h/o cervical stenosis/fusion and had C2-C6 nerve block yesterday.    7/3/24: LFTs 6/28 gr II elevation, abiraterone held on 7/1, continued on prednisone. Overall patient has been feeling well, he has been motivated to exercise more due to the fact that he is on ADT and wants to counteract increased calorie intake / easier weight gain. He is doing his best to push through fatigue at certain times, but also takes a naps. He continues to follow with pain management and is planned for cervical nerve ablation. Ongoing vasomotor symptoms, says that a few nights ago he was up every hour with hot flashes, this is not the norm, discussed potential gabapentin, however patient has been on this medication previously and does not wish to start right now. Notes some urinary urgency and nocturia 2-4. Endorse little to no libido, does not think he has gotten spontaneous erections.    7/24/24 reports moderate fatigue, hot flash and sweating 10 times, loss of energy. Has normal urination.   8/16/24 pt is in general well-being. Appetite is good. maintaining weight. Exercise 1-2 times a day [work-out]. Continues to endorse moderate fatigue and drinks coffee which helps keep him active and about for about 1 hour. Hot flashes is a bit less but more often. No swelling or joint pain which he follows locally for management.  9/6/24 pt is doing well. Appetite is good and maintaining weight. BM is normal. Continues to exercise 1-2 times daily. Continues to endorse moderate fatigue and mild hot flash with occ sleep interruption.   10/14/24 reports mod fatigue, fluctuating hot flash and sweating  11/5/24 - continues on abiraterone 750mg daily. Overall feeling fatigued, depressed, gaining weight. Uncle passed away, another family member in the ICU. Having episodes of crying and feeling depressed, no SI. Ongoing hot flashes. Urine flow is "fine", no urgency, nocturia 3-5x/night. Occasional BLE edema. Had the spacer placed for RT, more discomfort. Denies fever, chills, night sweats, headache, dizziness, chest pain, palpitations, SOB, cough, nausea/vomiting, diarrhea/constipation, abdominal pain, dysuria, hematuria, incontinence, bleeding, muscle or joint pain/weakness. [de-identified] : G7(4+3) disease in intraductal component, 3/8 sampled sites  [de-identified] : 11/26/24 - abiraterone discontinued early Nov 2024 d/t grade 4 transaminitis. Main issue is perineal pain since spacer gel placement. He saw Dr. Angel yesterday and started Augmentin for suspected infection. MRI done today shows an abscess between the prostate and rectum. No actual fevers but has been taking Motrin ATC with subjective fevers and shaking chills at times. Denies chest pain, palpitations, SOB, cough, nausea/vomiting, diarrhea/constipation, abdominal pain.   12/20/24 14/28 fractions on 12/16 for RT, reports mild fatigue, moderate hot flash and sweating. Nocturia 3 to 4.Completed augmentin yesterday about 3 week course for his perirectal abscess.   2/6/25 - RT completed 1/8/25. Feeling " a lot better". No fatigue, feeling more like himself. Ongoing hot flashes. Urine flow is "fine", no urgency, nocturia 4-5x/night. Occasional perineal "twinges".   4/7/25 - Feeling "good", back to good energy level. Ongoing hot flashes. Urine flow is "fine", rare urgency, nocturia "quite a few". Ongoing BLE edema, saw vascular with no significant findings, improved with compression socks. Denies fever, chills, night sweats, headache, dizziness, chest pain, palpitations, SOB, cough, nausea/vomiting, diarrhea/constipation, abdominal pain, dysuria, hematuria, incontinence, bleeding, muscle or joint pain/weakness.

## 2025-04-07 NOTE — REASON FOR VISIT
[Home] : at home, [unfilled] , at the time of the visit. [Medical Office: ([facility])___] : at the medical office located in  [Telehealth (audio & video)] : This visit was provided via telehealth using real-time 2-way audio visual technology. [Verbal consent obtained from patient] : the patient, [unfilled] [FreeTextEntry2] : Prostate Cancer

## 2025-04-07 NOTE — HISTORY OF PRESENT ILLNESS
[de-identified] : 59 year old male who is presenting for initial medical oncology evaluation for prostate adenocarcinoma. He noted a significant rise in his PSA to 6.23 on 2/6/24 (previous 3.71 on 1/20/23 and 1.11 on 4/1/16). MRI 4/12/24 showed 4.4 x 2.9 x 4.1 cm = 27 cc gland, with dominant lesions in the left posterior medial-lateral (PZpm-pl) base-to-midgland peripheral zone (1.6 cm, CT-5), with concern for capsular involvement but no clear EPE, abutting the left NVB and SV without clear invasion, and left posterior medial (PZpm) [id number] peripheral zone lesion extending to the midline posterior urethra at the apex (0.9 cm, CT-4). No LAD. No major anatomic issues. Prostate biopsy 4/23/24 showed right posterior lateral base G 3+4 with 50% involvement, MRI target#1 left PZ G 4+3 among 3 cores with 60%, 55% and 55% involvement with intraductal component, MRI #2 target left PZ G 3+4 among 2 cores with 85% and 55% involvement with intraductal and cribriform component. (small prostate so limited number of sites per Dr. Umaña). PSMA PET 5/14/24 showed increased radiotracer activity in the left side of the prostate gland suggests intermediate PSA expression, with compatible metastatic disease indicated by 0.6x0.3cm left para-aortic/common iliac node SUV 7.0, a 5mm left common iliac node SUV 5.8, a 0.6x0.6cm left external iliac node SUV 10.1, PSA 7.96 and Testosterone on 272 5/20/24. He was started on Orgovyx by urology on 5/21/24   Interval History: Overall he has been doing well. He denies any issues with urination. No constipation or diarrhea. He reports that he is great. He remains active and goes to the gym 6 days per week, as well as, golf a couple days.    PMH/PSH: Appendectomy, hernia repair, testicular torsion repair, vasectomy, cervical spinal fusion; HTN (Edarbi, Amlodopine) /HLD (atorvastin)   Social: Never smoker, occasional alcohol use. 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 Has six children (age 31- 15 years)  Family History: Maternal Uncle with hx of bladder cancer    6/12/24: Patient is more fatigued than normal, says he that he normally goes to bed pretty early and is finding he wants to go to bed even earlier. He does note that since stopping  orgovyx and starting lupron and stephanie/pred the fatigue has actually improved. He was exhausted when taking orgovyx. He is having hot flashes throughout the day, says they are tolerable. Feels his metabolism may have slowed a bit, however has also been going to a lot of events (graduation parties etc) at which he is eating outside of his normal diet. He reports nocturia x 4, increased from 2-3. Endorses decreased libido, does find he is able to have erection with cialis. Has h/o cervical stenosis/fusion and had C2-C6 nerve block yesterday.    7/3/24: LFTs 6/28 gr II elevation, abiraterone held on 7/1, continued on prednisone. Overall patient has been feeling well, he has been motivated to exercise more due to the fact that he is on ADT and wants to counteract increased calorie intake / easier weight gain. He is doing his best to push through fatigue at certain times, but also takes a naps. He continues to follow with pain management and is planned for cervical nerve ablation. Ongoing vasomotor symptoms, says that a few nights ago he was up every hour with hot flashes, this is not the norm, discussed potential gabapentin, however patient has been on this medication previously and does not wish to start right now. Notes some urinary urgency and nocturia 2-4. Endorse little to no libido, does not think he has gotten spontaneous erections.    7/24/24 reports moderate fatigue, hot flash and sweating 10 times, loss of energy. Has normal urination.   8/16/24 pt is in general well-being. Appetite is good. maintaining weight. Exercise 1-2 times a day [work-out]. Continues to endorse moderate fatigue and drinks coffee which helps keep him active and about for about 1 hour. Hot flashes is a bit less but more often. No swelling or joint pain which he follows locally for management.  9/6/24 pt is doing well. Appetite is good and maintaining weight. BM is normal. Continues to exercise 1-2 times daily. Continues to endorse moderate fatigue and mild hot flash with occ sleep interruption.   10/14/24 reports mod fatigue, fluctuating hot flash and sweating  11/5/24 - continues on abiraterone 750mg daily. Overall feeling fatigued, depressed, gaining weight. Uncle passed away, another family member in the ICU. Having episodes of crying and feeling depressed, no SI. Ongoing hot flashes. Urine flow is "fine", no urgency, nocturia 3-5x/night. Occasional BLE edema. Had the spacer placed for RT, more discomfort. Denies fever, chills, night sweats, headache, dizziness, chest pain, palpitations, SOB, cough, nausea/vomiting, diarrhea/constipation, abdominal pain, dysuria, hematuria, incontinence, bleeding, muscle or joint pain/weakness. [de-identified] : G7(4+3) disease in intraductal component, 3/8 sampled sites  [de-identified] : 11/26/24 - abiraterone discontinued early Nov 2024 d/t grade 4 transaminitis. Main issue is perineal pain since spacer gel placement. He saw Dr. Angel yesterday and started Augmentin for suspected infection. MRI done today shows an abscess between the prostate and rectum. No actual fevers but has been taking Motrin ATC with subjective fevers and shaking chills at times. Denies chest pain, palpitations, SOB, cough, nausea/vomiting, diarrhea/constipation, abdominal pain.   12/20/24 14/28 fractions on 12/16 for RT, reports mild fatigue, moderate hot flash and sweating. Nocturia 3 to 4.Completed augmentin yesterday about 3 week course for his perirectal abscess.   2/6/25 - RT completed 1/8/25. Feeling " a lot better". No fatigue, feeling more like himself. Ongoing hot flashes. Urine flow is "fine", no urgency, nocturia 4-5x/night. Occasional perineal "twinges".   4/7/25 - Feeling "good", back to good energy level. Ongoing hot flashes. Urine flow is "fine", rare urgency, nocturia "quite a few". Ongoing BLE edema, saw vascular with no significant findings, improved with compression socks. Denies fever, chills, night sweats, headache, dizziness, chest pain, palpitations, SOB, cough, nausea/vomiting, diarrhea/constipation, abdominal pain, dysuria, hematuria, incontinence, bleeding, muscle or joint pain/weakness.

## 2025-04-07 NOTE — REVIEW OF SYSTEMS
[Fever] : no fever [Chills] : no chills [Night Sweats] : no night sweats [Fatigue] : no fatigue [Chest Pain] : no chest pain [Lower Ext Edema] : lower extremity edema [Shortness Of Breath] : no shortness of breath [Cough] : no cough [Abdominal Pain] : no abdominal pain [Vomiting] : no vomiting [Constipation] : no constipation [Diarrhea: Grade 0] : Diarrhea: Grade 0 [Dysuria] : no dysuria [Incontinence] : no incontinence [Joint Pain] : no joint pain [Joint Stiffness] : no joint stiffness [Muscle Pain] : no muscle pain [Muscle Weakness] : no muscle weakness [Dizziness] : no dizziness [Hot Flashes] : hot flashes [Easy Bleeding] : no tendency for easy bleeding [Easy Bruising] : no tendency for easy bruising

## 2025-04-07 NOTE — ASSESSMENT
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 He has at least stage FARIDA prostate adenocarcinoma (up to [place] 7 (4+3). PSMA PET 5/14/24 showed several SUV positive sub-centimeter pelvic lymph nodes. This indicates very high risk feature for his newly diagnosed prostate cancer. The plan is to proceed with radiation and anti- hormone therapy. The recommended treatment approach in this setting (node positive disease) based on the STAMPEDE trial is adding 2 year abiraterone (+prednisone) to ADT plus RT as an option for patients with very high risk prostate cancer. Patient had been started on Orgovyx on 5/20/24, he continued for two weeks and then received first dose of Lupron on 6/5/24. He started abiraterone and prednisone in June 2024, discontinued in Nov 2024 d/t grade 4 transaminitis.   Plan: Very high risk of prostate cancer with pelvic nodes - Initial PSA was 7.96 on 5/20/24, most recently <0.01 on 4/4/25.  - Continue Lupron [id number], last given 3/4/25, next scheduled for 6/4/25.  - Per d/w Dr. Rice, given ALT >20x ULN, abiraterone has been permanently discontinued. He will continue on ADT alone at this time.  - RT to the prostate and pelvic LNs x 28 fx started 11/15/24, completed 1/8/25 (delay during tx d/t perirectal abscess)  Katherine rectal abscess: - 11/26/24 MRI pelvis shows a collection measuring 3.4 x 3.1 x 5.0 cm between the rectum and prostate consistent with an abscess (infection associated with the spacer gel). Pt admitted to [facility] for management, treated with 3wks of oral abx. - Resolved  Transaminitis -  Reviewed 8/31/24 LFT with pt/spouse noting AST-59, ALT - 94, T.B - 1.4; will continue to monitor < GR 1 - 11/4/24 AST = 467, ALT = 1,0006 - grade 4 - Abiraterone discontinued permanently, LFTs have since normalized.   Instructed to contact our office with any new/worsening symptoms. Pt educated regarding plan of care, all questions/concerns addressed to the best of my abilities and his apparent satisfaction. RTC 2mo

## 2025-04-07 NOTE — PHYSICAL EXAM
[Fully active, able to carry on all pre-disease performance without restriction] : Status 0 - Fully active, able to carry on all pre-disease performance without restriction [Normal] : affect appropriate [de-identified] : anicteric

## 2025-04-07 NOTE — ASSESSMENT
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 He has at least stage FARIDA prostate adenocarcinoma (up to [place] 7 (4+3). PSMA PET 5/14/24 showed several SUV positive sub-centimeter pelvic lymph nodes. This indicates very high risk feature for his newly diagnosed prostate cancer. The plan is to proceed with radiation and anti- hormone therapy. The recommended treatment approach in this setting (node positive disease) based on the STAMPEDE trial is adding 2 year abiraterone (+prednisone) to ADT plus RT as an option for patients with very high risk prostate cancer. Patient had been started on Orgovyx on 5/20/24, he continued for two weeks and then received first dose of Lupron on 6/5/24. He started abiraterone and prednisone in June 2024, discontinued in Nov 2024 d/t grade 4 transaminitis.   Plan: Very high risk of prostate cancer with pelvic nodes - Initial PSA was 7.96 on 5/20/24, most recently <0.01 on 4/4/25.  - Continue Lupron [id number], last given 3/4/25, next scheduled for 6/4/25.  - Per d/w Dr. Rice, given ALT >20x ULN, abiraterone has been permanently discontinued. He will continue on ADT alone at this time.  - RT to the prostate and pelvic LNs x 28 fx started 11/15/24, completed 1/8/25 (delay during tx d/t perirectal abscess)  Katherine rectal abscess: - 11/26/24 MRI pelvis shows a collection measuring 3.4 x 3.1 x 5.0 cm between the rectum and prostate consistent with an abscess (infection associated with the spacer gel). Pt admitted to [facility] for management, treated with 3wks of oral abx. - Resolved  Transaminitis -  Reviewed 8/31/24 LFT with pt/spouse noting AST-59, ALT - 94, T.B - 1.4; will continue to monitor < GR 1 - 11/4/24 AST = 467, ALT = 1,0006 - grade 4 - Abiraterone discontinued permanently, LFTs have since normalized.   Instructed to contact our office with any new/worsening symptoms. Pt educated regarding plan of care, all questions/concerns addressed to the best of my abilities and his apparent satisfaction. RTC 2mo

## 2025-04-07 NOTE — REASON FOR VISIT
[Home] : at home, [unfilled] , at the time of the visit. [Medical Office: ([facility])___] : at the medical office located in  [Telehealth (audio & video)] : This visit was provided via telehealth using real-time 2-way audio visual technology. [Verbal consent obtained from patient] : the patient, [unfilled] [FreeTextEntry2] : Prostate Cancer

## 2025-05-08 NOTE — HISTORY OF PRESENT ILLNESS
[Neck Pain] : neck pain [___ yrs] : [unfilled] year(s) ago [5] : an average pain level of 5/10 [8] : a maximum pain level of 8/10 [Home] : at home, [unfilled] , at the time of the visit. [Medical Office: ([facility])___] : at the medical office located in  [Verbal consent obtained from patient] : the patient, [unfilled] [FreeTextEntry1] : Interval Note: Reports left sided neck pain radiating to left lateral arm.  Pain is so bad that patient finds it difficult to perform adls at times.   Denies any additional weakness, numbness, bowel/bladder dysfunction.     HPI  Mr. GARO GARDUNO is a 60 year M with pmhx of htn, prostate ca following with Dr. Krystal liao radiating treatment, lupron,  hlp sp C6-7 ACDF 11/23 with Dr. Schwab reports 1 year of pain over left neck, shoulder, trapezius region and lateral arm and fingers.  Pain is so bad that patient finds it difficult to perform adls.  Patient is very active and previously exercised extensively. denies any worsening numbness, weakness, bowel/bladder dysfunction.     Previous and current pain medications/doses/effects:  diclofenac flexeril  Previous Pain Treatments:  PT Chiropractic care Acupuncture  Previous Pain Injections: LEFT C4, 5, 6 medial branch block 6/25/24   LEFT C4, 5, 6 medial branch block 6/11/24 C7-T1 interlaminar epidural steroid injection 7//28/23 C7-T1 interlaminar epidural steroid injection 2/10/23 C7-T1 interlaminar epidural steroid injection 8/8/22 C7-T1 epidural steroid injection 12/16/21 Cervical epidural steroid injection x 2 1/21 with transient relief  Previous Diagnostic Studies/Images:  MRI CS 2/24  Patient is now status post anterior cervical discectomy and fusion from C6 to C7 using an anterior plate secured by bilateral lateral body screws. No evidence for hardware complication.  There is normal cervical lordosis. No subluxation. Vertebral body heights are maintained. Bone marrow signal is unremarkable. No prevertebral soft tissue swelling.  The cervical cord is normal in size and signal characteristics. Visualized portion of the posterior fossa is unremarkable.  C2-C3 level: Broad-based posterior disc bulge with superimposed right paracentral protrusion in conjunction with mild lateral facet arthropathy results in mild right neural foraminal narrowing but no significant left neural foraminal narrowing or central canal narrowing.  C3-C4 level: Small broad-based posterior disc bulge and mild bilateral facet arthropathy result in mild left neural foraminal narrowing, mild to moderate right neural foraminal narrowing but no significant central canal narrowing.  C4-C5 level:  No disc herniation. No central canal or foraminal narrowing.   C5-C6 level: Broad-based posterior disc bulge with superimposed right paracentral protrusion in conjunction with bilateral facet arthropathy results in mild to moderate right neural foraminal narrowing, mild left neural foraminal narrowing and mild central canal narrowing.  C6-C7 level: Mild bilateral facet arthropathy and posterior osseous ridging contribute to mild bilateral neural foraminal narrowing but no significant central canal narrowing.  C7-T1 level: No disc herniation. No central canal or foraminal narrowing.   In the visualized upper thoracic spine there is no disc herniation or central canal narrowing.   IMPRESSION:  Interval anterior cervical discectomy and fusion at C6-C7 with resolved central canal narrowing at this level.  Discogenic degenerative disease and facet arthropathy of the cervical spine, most pronounced at C5-C6 where there is mild central canal narrowing, mild to moderate right neural foraminal narrowing and mild left neural foraminal narrowing. Additional varying degrees of neural foraminal narrowing, as above. These findings are not significantly changed as compared to prior examination.  --- End of Report ---       ED GARRETT MBA-JAZLYN DONAHUE; Attending Radiologist This document has been electronically signed. Feb 29 2024 11:38AM  MRI CS 10/23  C1/2: Moderate arthrosis between the dens and the anterior arch of C1. C2/C3: Small to moderate posterior disc protrusion that is asymmetric to the right with associated uncovertebral spurring causing moderate right foraminal narrowing. Disc indents the ventral thecal sac but does not contact the cord. C3/C4: Small to moderate posterior disc protrusion with posterior osteophyte formation indenting the ventral thecal sac and nearly contacting the cord. Moderate to severe right and moderate left foraminal narrowing. C4/C5: Minimal disc bulging without spinal canal or foraminal narrowing. C5/C6: Moderate sized posterior disc protrusion that indents the ventral thecal sac and contacts the right aspect of the cord. Mild to moderate bilateral foraminal narrowing. C6/C7: Mild retrolisthesis with a small to moderate posterior disc protrusion with posterior osteophyte formation indenting the ventral thecal sac without contact upon the cord. Severe left and moderate to severe right foraminal narrowing. C7/T1: Normal  ALIGNMENT: Mild retrolisthesis at C3-C4 and C6-C7. CORD: There is no cord signal abnormality. MARROW: Right-sided endplate marrow edema. IMAGED BRAIN: Normal PERIPHERAL/NECK SOFT TISSUES: Normal  IMPRESSION: Multilevel cervical spondylosis most prominent at C2-C3, C3-C4 as well as at C5-C6 and C6-C7. Spinal canal and foraminal narrowing as detailed above and most advanced bilaterally at C6-C7.  These findings are minimally progressed compared to the prior study from 6/18/2021   MRI CS 6/21  C1/2: No central canal narrowing. C2/C3: Facet and uncovertebral hypertrophy with moderate to severe right and mild left foraminal narrowing. No central canal narrowing. C3/C4: Osseous ridging effacing ventral thecal sac. Facet and uncovertebral hypertrophy results in moderate to severe right and moderate left foraminal narrowing. C4/C5: Moderate facet degenerative change. Mild bilateral uncovertebral hypertrophy. Moderate right and mild to moderate left foraminal narrowing. No central canal narrowing. C5/C6: Posterior osseous ridging with a superimposed central/right paracentral disc protrusion. Mild to moderate bilateral foraminal narrowing. Mild central canal narrowing. C6/C7: Posterior osseous ridging effaces the ventral thecal sac. Left foraminal disc osteophyte complex. Severe left foraminal narrowing. Moderate right foraminal narrowing. Mild central canal narrowing. C7/T1: No central canal or foraminal narrowing. T1/T2: No central canal or foraminal narrowing.  ALIGNMENT: Mild straightening of the cervical spine. Mild varying levels of disc space narrowing. CORD: No abnormal signal in the spinal cord. MARROW: There is edema at the inferior endplate of C6 and superior endplate of C7 on the left in keeping with mild Modic type I changes. No fracture identified. IMAGED BRAIN: Cervicomedullary junction is intact. PERIPHERAL/NECK SOFT TISSUES: Symmetric appearance of the paraspinal musculature.  IMPRESSION: Moderate to severe multilevel spondylosis. Mild central canal narrowing at C5-6 and C6-7 as above. Severe left foraminal narrowing with left foraminal disc osteophyte complex at C6-7. Additional foraminal narrowing described above.  MRI TS 2/21  T5-8 disc herniation deforming the thecal sac with T5-6 cord abutment.  T6-7 and T7-8 left paracentral orientation

## 2025-05-08 NOTE — ASSESSMENT
[FreeTextEntry1] : >> Imaging and Other Studies  I personally reviewed the relevant imaging. Discussed and explained to patient the likely source of pathology and pain. Questions answered. MRI XR  repeat MRI CS w wo IVC (recent dx of prostate ca) >> Therapy and Other Modalities   >> Medications   >> Interventions  MRI demonstrating disc herniation causing radiculopathy, significantly impactful to ability to perform ADL and refractory to conservative treatments. sp C7-T1 interlaminar epidural steroid injection with improvement  given efficacy of previous intervention that is >80% improvement in pain and improved ability to perform adls, and return of pain despite conservative treatment, sp repeat C7-T1 interlaminar epidural steroid injection   significant component of cervical pain likely secondary to spondylosis demonstrated on MRI CS, refractory to conservative treatments, sp diagnostic LEFT C4, 5, 6 medial branch block with 100% improvement in neck and shoulder pain x 2  >> Consults  continue care with oncology  >> Discussion of Risks/Benefits/Alternatives   >Regarding any scheduled procedures:  I have discussed in detail with the patient that any interventional pain procedure is associated with potential risks. The procedure may include an injection of steroids and potentially other medications (local anesthetic and normal saline) into the epidural space or surrounding tissue of the spine. There are significant risks of this procedure which include and are not limited to infection, bleeding, worsening pain, dural puncture leading to postdural puncture headache, nerve damage, spinal cord injury, paralysis, stroke, and death.  There is a chance that the procedure does not improve their pain.  There are risks associated with the steroid being absorbed into the body systemically. These include dysphoria, difficulty sleeping, mood swings and personality changes. Premenopausal women may notice an irregularity in her menstrual cycle for 2-3 months following the injection. Steroids can specifically affect patients with hypertension, diabetes, and peptic ulcers. The procedure may cause a temporary increase in blood pressure and blood pressure, and may adversely affect a peptic ulcer. Other, more rare complications, include avascular necrosis of joints, glaucoma and worsening of osteoporosis.  I have discussed the risks of the procedure at length with the patient, and the potential benefits of pain relief. I have offered alternatives to the procedure. All questions were answered.  The patient expressed understanding and wishes to proceed with the procedure.   > Longitudinal management of Complex Painful condition   The patient is being managed for a complex condition that requires ongoing management.  The nature of this condition demands nuanced approach to treatment.  The seriousness of the condition necessitates an in-depth and focused approach to management and coordination with other healthcare professionals.    This visit involves intricate evaluation and management of the patient's condition.  The complexity of the visit was due to the need for detailed assessment of the current state, consideration of potential complications and a careful balancing of treatment options to management the chronic condition effectively.   As detailed above, the patient has a chronic significant painful condition that requires regular and detailed management.  The condition's impact on the patient's quality of life and health is substantial and necessitates a comprehensive and tailored approach   >> Conclusion   There were no barriers to communication. Informed patient that I would be available for any additional questions. Patient was instructed to call with any worsening symptoms including severe pain, new numbness/weakness, or changes in the bowel/bladder function. Discussed role of nsaids in pain management and all relevant risks, if patient is continuing to require after 4 weeks the patient should f/u for alternative treatment. Instructed patient to maintain pain diary to monitor pain level, mobility, and function.  I explained to patient benefits and limitation of TeleMedicine visits  Patient understands that limitations include inability to perform comprehensive physical exam, which may lead to potential diagnostic inconsistencies.    Any scheduled procedures are based on history, imaging and limited physical exam performed on TeleHealth visit.  If necessary, additional focal physical exam will be performed on date of procedure  Patient understands that diagnosis and treatment may be limited by these inconsistencies and patient agrees to proceed with care plan

## 2025-05-08 NOTE — PHYSICAL EXAM
[de-identified] :  Constitutional: Normal, well developed, no acute distress on audio/video examination Eyes: Symmetric, External structures on video examination ENT: Lips, mucosa and tongue normal on video examination Oropharynx: Lips normal, symmetric, no external lesions appreciated appreciated on video examination Respiratory: Non-labored breathing, no audible wheezes appreciated on audio/video examination Vascular: No cyanosis appreciated or edema appreciated on video examination GI:  no jaundice appreciated on video examination Neurovascular: CN grossly intact on video/audio examination, alert MSK: Normal muscle bulk on video examination

## 2025-05-15 NOTE — HISTORY OF PRESENT ILLNESS
[Neck Pain] : neck pain [___ yrs] : [unfilled] year(s) ago [5] : an average pain level of 5/10 [8] : a maximum pain level of 8/10 [Home] : at home, [unfilled] , at the time of the visit. [Medical Office: ([facility])___] : at the medical office located in  [Verbal consent obtained from patient] : the patient, [unfilled] [FreeTextEntry1] : Interval Note: Reports left sided neck pain radiating to left lateral arm.  Pain is so bad that patient finds it difficult to perform adls at times.   Denies any additional weakness, numbness, bowel/bladder dysfunction.     HPI  Mr. GARO GARDUNO is a 60 year M with pmhx of htn, prostate ca following with Dr. Krystal liao radiating treatment, lupron,  hlp sp C6-7 ACDF 11/23 with Dr. Schwab reports 1 year of pain over left neck, shoulder, trapezius region and lateral arm and fingers.  Pain is so bad that patient finds it difficult to perform adls.  Patient is very active and previously exercised extensively. denies any worsening numbness, weakness, bowel/bladder dysfunction.     Previous and current pain medications/doses/effects:  diclofenac flexeril  Previous Pain Treatments:  PT Chiropractic care Acupuncture  Previous Pain Injections:  LEFT C4, 5, 6 medial branch block 6/25/24   LEFT C4, 5, 6 medial branch block 6/11/24 C7-T1 interlaminar epidural steroid injection 7//28/23 C7-T1 interlaminar epidural steroid injection 2/10/23 C7-T1 interlaminar epidural steroid injection 8/8/22 C7-T1 epidural steroid injection 12/16/21 Cervical epidural steroid injection x 2 1/21 with transient relief  Previous Diagnostic Studies/Images:  MRI CS 5/25  C1/2: No central canal narrowing. C2/C3: Moderate right facet hypertrophic changes with a right lateral recess/foraminal disc protrusion. Moderate right foraminal narrowing. No central canal or left foraminal narrowing. C3/C4: Moderate facet arthrosis. Moderate bilateral foraminal narrowing. Mild central canal narrowing. C4/C5: Mild facet arthrosis. Moderate foraminal narrowing. No central canal or left foraminal narrowing. C5/C6: Mild osseous ridging and disc bulging. Right foraminal disc osteophyte complex. Moderate right foraminal narrowing. Mild flattening the right ventral spinal cord. Moderate central canal narrowing. C6/C7: Mild spurring into the foramina. Moderate left and mild to moderate foraminal narrowing. No central canal narrowing. C7/T1: Mild spurring into the foramen with mild right and moderate left foraminal narrowing. No central canal narrowing.  ALIGNMENT: Mild straightening of the cervical spine. CORD: No signal abnormality appreciated within the spinal cord. No abnormal enhancement in the spinal cord. MARROW: Fusion hardware at C6-7 anteriorly limited by susceptibility artifact. Modic type II changes at C3-4 and at the inferior endplate of C2. IMAGED BRAIN: Cervicomedullary junction is intact. PERIPHERAL/NECK SOFT TISSUES: Small nodule in the right thyroid gland.  IMPRESSION: 1.  Status post ACDF at C6-7. Similar to prior study. 2.  Multilevel spondylosis as detailed above. Similar to prior study. 3.  Small nodule on the right thyroid gland for which correlation with thyroid sonogram is recommended.  MRI CS 2/24  Patient is now status post anterior cervical discectomy and fusion from C6 to C7 using an anterior plate secured by bilateral lateral body screws. No evidence for hardware complication.  There is normal cervical lordosis. No subluxation. Vertebral body heights are maintained. Bone marrow signal is unremarkable. No prevertebral soft tissue swelling.  The cervical cord is normal in size and signal characteristics. Visualized portion of the posterior fossa is unremarkable.  C2-C3 level: Broad-based posterior disc bulge with superimposed right paracentral protrusion in conjunction with mild lateral facet arthropathy results in mild right neural foraminal narrowing but no significant left neural foraminal narrowing or central canal narrowing.  C3-C4 level: Small broad-based posterior disc bulge and mild bilateral facet arthropathy result in mild left neural foraminal narrowing, mild to moderate right neural foraminal narrowing but no significant central canal narrowing.  C4-C5 level:  No disc herniation. No central canal or foraminal narrowing.   C5-C6 level: Broad-based posterior disc bulge with superimposed right paracentral protrusion in conjunction with bilateral facet arthropathy results in mild to moderate right neural foraminal narrowing, mild left neural foraminal narrowing and mild central canal narrowing.  C6-C7 level: Mild bilateral facet arthropathy and posterior osseous ridging contribute to mild bilateral neural foraminal narrowing but no significant central canal narrowing.  C7-T1 level: No disc herniation. No central canal or foraminal narrowing.   In the visualized upper thoracic spine there is no disc herniation or central canal narrowing.   IMPRESSION:  Interval anterior cervical discectomy and fusion at C6-C7 with resolved central canal narrowing at this level.  Discogenic degenerative disease and facet arthropathy of the cervical spine, most pronounced at C5-C6 where there is mild central canal narrowing, mild to moderate right neural foraminal narrowing and mild left neural foraminal narrowing. Additional varying degrees of neural foraminal narrowing, as above. These findings are not significantly changed as compared to prior examination.  --- End of Report ---       ED GARRETT MBA-JAZLYN DONAHUE; Attending Radiologist This document has been electronically signed. Feb 29 2024 11:38AM  MRI CS 10/23  C1/2: Moderate arthrosis between the dens and the anterior arch of C1. C2/C3: Small to moderate posterior disc protrusion that is asymmetric to the right with associated uncovertebral spurring causing moderate right foraminal narrowing. Disc indents the ventral thecal sac but does not contact the cord. C3/C4: Small to moderate posterior disc protrusion with posterior osteophyte formation indenting the ventral thecal sac and nearly contacting the cord. Moderate to severe right and moderate left foraminal narrowing. C4/C5: Minimal disc bulging without spinal canal or foraminal narrowing. C5/C6: Moderate sized posterior disc protrusion that indents the ventral thecal sac and contacts the right aspect of the cord. Mild to moderate bilateral foraminal narrowing. C6/C7: Mild retrolisthesis with a small to moderate posterior disc protrusion with posterior osteophyte formation indenting the ventral thecal sac without contact upon the cord. Severe left and moderate to severe right foraminal narrowing. C7/T1: Normal  ALIGNMENT: Mild retrolisthesis at C3-C4 and C6-C7. CORD: There is no cord signal abnormality. MARROW: Right-sided endplate marrow edema. IMAGED BRAIN: Normal PERIPHERAL/NECK SOFT TISSUES: Normal  IMPRESSION: Multilevel cervical spondylosis most prominent at C2-C3, C3-C4 as well as at C5-C6 and C6-C7. Spinal canal and foraminal narrowing as detailed above and most advanced bilaterally at C6-C7.  These findings are minimally progressed compared to the prior study from 6/18/2021   MRI CS 6/21  C1/2: No central canal narrowing. C2/C3: Facet and uncovertebral hypertrophy with moderate to severe right and mild left foraminal narrowing. No central canal narrowing. C3/C4: Osseous ridging effacing ventral thecal sac. Facet and uncovertebral hypertrophy results in moderate to severe right and moderate left foraminal narrowing. C4/C5: Moderate facet degenerative change. Mild bilateral uncovertebral hypertrophy. Moderate right and mild to moderate left foraminal narrowing. No central canal narrowing. C5/C6: Posterior osseous ridging with a superimposed central/right paracentral disc protrusion. Mild to moderate bilateral foraminal narrowing. Mild central canal narrowing. C6/C7: Posterior osseous ridging effaces the ventral thecal sac. Left foraminal disc osteophyte complex. Severe left foraminal narrowing. Moderate right foraminal narrowing. Mild central canal narrowing. C7/T1: No central canal or foraminal narrowing. T1/T2: No central canal or foraminal narrowing.  ALIGNMENT: Mild straightening of the cervical spine. Mild varying levels of disc space narrowing. CORD: No abnormal signal in the spinal cord. MARROW: There is edema at the inferior endplate of C6 and superior endplate of C7 on the left in keeping with mild Modic type I changes. No fracture identified. IMAGED BRAIN: Cervicomedullary junction is intact. PERIPHERAL/NECK SOFT TISSUES: Symmetric appearance of the paraspinal musculature.  IMPRESSION: Moderate to severe multilevel spondylosis. Mild central canal narrowing at C5-6 and C6-7 as above. Severe left foraminal narrowing with left foraminal disc osteophyte complex at C6-7. Additional foraminal narrowing described above.  MRI TS 2/21  T5-8 disc herniation deforming the thecal sac with T5-6 cord abutment.  T6-7 and T7-8 left paracentral orientation

## 2025-05-15 NOTE — PHYSICAL EXAM
[de-identified] :  Constitutional: Normal, well developed, no acute distress on audio/video examination Eyes: Symmetric, External structures on video examination ENT: Lips, mucosa and tongue normal on video examination Oropharynx: Lips normal, symmetric, no external lesions appreciated appreciated on video examination Respiratory: Non-labored breathing, no audible wheezes appreciated on audio/video examination Vascular: No cyanosis appreciated or edema appreciated on video examination GI:  no jaundice appreciated on video examination Neurovascular: CN grossly intact on video/audio examination, alert MSK: Normal muscle bulk on video examination

## 2025-05-22 NOTE — PROCEDURE
[FreeTextEntry1] :   CERVICAL EPIDURAL STEROID INJECTION         The patient was placed in the prone position on the fluoroscopic table with a pillow under the chest.  Routine monitors were applied.  A surgical timeout was performed.  The back was prepped and draped in the usual sterile fashion and sterile technique was adhered to throughout the entire procedure.               The [C7-T1] was identified under fluoroscopic guidance.  The vertebral body end plates were aligned and the spinous process was midline between the lateral processes.  The skin and subcutaneous tissues were infiltrated with 1% Lidocaine.  After adequate local anesthesia a]: "20g tuohy" needle was inserted at   [C7-T1] and aimed towards the affected] side.                Using a loss of resistance to air technique the epidural space was identified.  After negative aspiration for heme and CSF, 1cc Omnipaque N180 contrast dye was injected.  Epidural spread of contrast was confirmed in the AP and lateral views.  The patient was injected with a mixture of 80mg kenalog and 3cc of PF normal saline.               The patient tolerated the procedure well.  There was no neurological deficit post procedure.  The patient went to recovery room in stable condition.  Discharge instructions were given to the patient.  All unused medications were wasted

## 2025-06-05 NOTE — REASON FOR VISIT
[Follow-Up Visit] : a follow-up [Home] : at home, [unfilled] , at the time of the visit. [Medical Office: ([facility])___] : at the medical office located in  [Telehealth (audio & video)] : This visit was provided via telehealth using real-time 2-way audio visual technology. [Verbal consent obtained from patient] : the patient, [unfilled] [FreeTextEntry2] : Prostate Cancer

## 2025-06-05 NOTE — HISTORY OF PRESENT ILLNESS
[Disease: _____________________] : Disease: [unfilled] [T: ___] : T[unfilled] [N: ___] : N[unfilled] [AJCC Stage: ____] : AJCC Stage: [unfilled] [de-identified] : 59 year old male who is presenting for initial medical oncology evaluation for prostate adenocarcinoma. He noted a significant rise in his PSA to 6.23 on 2/6/24 (previous 3.71 on 1/20/23 and 1.11 on 4/1/16). MRI 4/12/24 showed 4.4 x 2.9 x 4.1 cm = 27 cc gland, with dominant lesions in the left posterior medial-lateral (PZpm-pl) base-to-midgland peripheral zone (1.6 cm, CO-5), with concern for capsular involvement but no clear EPE, abutting the left NVB and SV without clear invasion, and left posterior medial (PZpm) [id number] peripheral zone lesion extending to the midline posterior urethra at the apex (0.9 cm, CO-4). No LAD. No major anatomic issues. Prostate biopsy 4/23/24 showed right posterior lateral base G 3+4 with 50% involvement, MRI target#1 left PZ G 4+3 among 3 cores with 60%, 55% and 55% involvement with intraductal component, MRI #2 target left PZ G 3+4 among 2 cores with 85% and 55% involvement with intraductal and cribriform component. (small prostate so limited number of sites per Dr. Umaña). PSMA PET 5/14/24 showed increased radiotracer activity in the left side of the prostate gland suggests intermediate PSA expression, with compatible metastatic disease indicated by 0.6x0.3cm left para-aortic/common iliac node SUV 7.0, a 5mm left common iliac node SUV 5.8, a 0.6x0.6cm left external iliac node SUV 10.1, PSA 7.96 and Testosterone on 272 5/20/24. He was started on Orgovyx by urology on 5/21/24   Interval History: Overall he has been doing well. He denies any issues with urination. No constipation or diarrhea. He reports that he is great. He remains active and goes to the gym 6 days per week, as well as, golf a couple days.    PMH/PSH: Appendectomy, hernia repair, testicular torsion repair, vasectomy, cervical spinal fusion; HTN (Edarbi, Amlodopine) /HLD (atorvastin)   Social: Never smoker, occasional alcohol use. 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 Has six children (age 31- 15 years)  Family History: Maternal Uncle with hx of bladder cancer    6/12/24: Patient is more fatigued than normal, says he that he normally goes to bed pretty early and is finding he wants to go to bed even earlier. He does note that since stopping  orgovyx and starting lupron and stephanie/pred the fatigue has actually improved. He was exhausted when taking orgovyx. He is having hot flashes throughout the day, says they are tolerable. Feels his metabolism may have slowed a bit, however has also been going to a lot of events (graduation parties etc) at which he is eating outside of his normal diet. He reports nocturia x 4, increased from 2-3. Endorses decreased libido, does find he is able to have erection with cialis. Has h/o cervical stenosis/fusion and had C2-C6 nerve block yesterday.    7/3/24: LFTs 6/28 gr II elevation, abiraterone held on 7/1, continued on prednisone. Overall patient has been feeling well, he has been motivated to exercise more due to the fact that he is on ADT and wants to counteract increased calorie intake / easier weight gain. He is doing his best to push through fatigue at certain times, but also takes a naps. He continues to follow with pain management and is planned for cervical nerve ablation. Ongoing vasomotor symptoms, says that a few nights ago he was up every hour with hot flashes, this is not the norm, discussed potential gabapentin, however patient has been on this medication previously and does not wish to start right now. Notes some urinary urgency and nocturia 2-4. Endorse little to no libido, does not think he has gotten spontaneous erections.    7/24/24 reports moderate fatigue, hot flash and sweating 10 times, loss of energy. Has normal urination.   8/16/24 pt is in general well-being. Appetite is good. maintaining weight. Exercise 1-2 times a day [work-out]. Continues to endorse moderate fatigue and drinks coffee which helps keep him active and about for about 1 hour. Hot flashes is a bit less but more often. No swelling or joint pain which he follows locally for management.  9/6/24 pt is doing well. Appetite is good and maintaining weight. BM is normal. Continues to exercise 1-2 times daily. Continues to endorse moderate fatigue and mild hot flash with occ sleep interruption.   10/14/24 reports mod fatigue, fluctuating hot flash and sweating  11/5/24 - continues on abiraterone 750mg daily. Overall feeling fatigued, depressed, gaining weight. Uncle passed away, another family member in the ICU. Having episodes of crying and feeling depressed, no SI. Ongoing hot flashes. Urine flow is "fine", no urgency, nocturia 3-5x/night. Occasional BLE edema. Had the spacer placed for RT, more discomfort. Denies fever, chills, night sweats, headache, dizziness, chest pain, palpitations, SOB, cough, nausea/vomiting, diarrhea/constipation, abdominal pain, dysuria, hematuria, incontinence, bleeding, muscle or joint pain/weakness. [de-identified] : G7(4+3) disease in intraductal component, 3/8 sampled sites  [de-identified] : 11/26/24 - abiraterone discontinued early Nov 2024 d/t grade 4 transaminitis. Main issue is perineal pain since spacer gel placement. He saw Dr. Angel yesterday and started Augmentin for suspected infection. MRI done today shows an abscess between the prostate and rectum. No actual fevers but has been taking Motrin ATC with subjective fevers and shaking chills at times. Denies chest pain, palpitations, SOB, cough, nausea/vomiting, diarrhea/constipation, abdominal pain.   12/20/24 14/28 fractions on 12/16 for RT, reports mild fatigue, moderate hot flash and sweating. Nocturia 3 to 4.Completed augmentin yesterday about 3 week course for his perirectal abscess.   2/6/25 - RT completed 1/8/25. Feeling " a lot better". No fatigue, feeling more like himself. Ongoing hot flashes. Urine flow is "fine", no urgency, nocturia 4-5x/night. Occasional perineal "twinges".   4/7/25 - Feeling "good", back to good energy level. Ongoing hot flashes. Urine flow is "fine", rare urgency, nocturia "quite a few". Ongoing BLE edema, saw vascular with no significant findings, improved with compression socks. Denies fever, chills, night sweats, headache, dizziness, chest pain, palpitations, SOB, cough, nausea/vomiting, diarrhea/constipation, abdominal pain, dysuria, hematuria, incontinence, bleeding, muscle or joint pain/weakness.  6/4/25 reports moderate fatigue, 4 episodes of hot flashes and sweating at night. Has normal urination. Nocturia 3.

## 2025-06-05 NOTE — ASSESSMENT
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 He has at least stage FARIDA prostate adenocarcinoma (up to [place] 7 (4+3). PSMA PET 5/14/24 showed several SUV positive sub-centimeter pelvic lymph nodes. This indicates very high risk feature for his newly diagnosed prostate cancer. The plan is to proceed with radiation and anti- hormone therapy. The recommended treatment approach in this setting (node positive disease) based on the STAMPEDE trial is adding 2 year abiraterone (+prednisone) to ADT plus RT as an option for patients with very high risk prostate cancer. Patient had been started on Orgovyx on 5/20/24, he continued for two weeks and then received first dose of Lupron on 6/5/24. He started abiraterone and prednisone in June 2024, discontinued in Nov 2024 d/t grade 4 transaminitis.   Plan: Very high risk of prostate cancer with pelvic nodes - Initial PSA was 7.96 on 5/20/24, most recently <0.01 on 4/4/25.  - Continue Lupron [id number], last given 3/4/25, next scheduled for 9/4/25.  - Per d/w Dr. Rice, given ALT >20x ULN, abiraterone has been permanently discontinued. He will continue on ADT alone at this time.  - RT to the prostate and pelvic LNs x 28 fx started 11/15/24, completed 1/8/25 (delay during tx d/t perirectal abscess)  Katherine rectal abscess: - 11/26/24 MRI pelvis shows a collection measuring 3.4 x 3.1 x 5.0 cm between the rectum and prostate consistent with an abscess (infection associated with the spacer gel). Pt admitted to [facility] for management, treated with 3wks of oral abx. - Resolved  Transaminitis -  Reviewed 8/31/24 LFT with pt/spouse noting AST-59, ALT - 94, T.B - 1.4; will continue to monitor < GR 1 - 11/4/24 AST = 467, ALT = 1,0006 - grade 4 - Abiraterone discontinued permanently, LFTs have since normalized.   Instructed to contact our office with any new/worsening symptoms. Pt educated regarding plan of care, all questions/concerns addressed to the best of my abilities and his apparent satisfaction. RTC 2mo

## 2025-06-05 NOTE — ASSESSMENT
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 He has at least stage FARIDA prostate adenocarcinoma (up to [place] 7 (4+3). PSMA PET 5/14/24 showed several SUV positive sub-centimeter pelvic lymph nodes. This indicates very high risk feature for his newly diagnosed prostate cancer. The plan is to proceed with radiation and anti- hormone therapy. The recommended treatment approach in this setting (node positive disease) based on the STAMPEDE trial is adding 2 year abiraterone (+prednisone) to ADT plus RT as an option for patients with very high risk prostate cancer. Patient had been started on Orgovyx on 5/20/24, he continued for two weeks and then received first dose of Lupron on 6/5/24. He started abiraterone and prednisone in June 2024, discontinued in Nov 2024 d/t grade 4 transaminitis.   Plan: Very high risk of prostate cancer with pelvic nodes - Initial PSA was 7.96 on 5/20/24, most recently <0.01 on 4/4/25.  - Continue Lupron [id number], last given 3/4/25, next scheduled for 9/4/25.  - Per d/w Dr. Rice, given ALT >20x ULN, abiraterone has been permanently discontinued. He will continue on ADT alone at this time.  - RT to the prostate and pelvic LNs x 28 fx started 11/15/24, completed 1/8/25 (delay during tx d/t perirectal abscess)  Katherine rectal abscess: - 11/26/24 MRI pelvis shows a collection measuring 3.4 x 3.1 x 5.0 cm between the rectum and prostate consistent with an abscess (infection associated with the spacer gel). Pt admitted to [facility] for management, treated with 3wks of oral abx. - Resolved  Transaminitis -  Reviewed 8/31/24 LFT with pt/spouse noting AST-59, ALT - 94, T.B - 1.4; will continue to monitor < GR 1 - 11/4/24 AST = 467, ALT = 1,0006 - grade 4 - Abiraterone discontinued permanently, LFTs have since normalized.   Instructed to contact our office with any new/worsening symptoms. Pt educated regarding plan of care, all questions/concerns addressed to the best of my abilities and his apparent satisfaction. RTC 2mo

## 2025-06-05 NOTE — PHYSICAL EXAM
[Fully active, able to carry on all pre-disease performance without restriction] : Status 0 - Fully active, able to carry on all pre-disease performance without restriction [Normal] : affect appropriate [de-identified] : anicteric

## 2025-06-05 NOTE — PHYSICAL EXAM
[Fully active, able to carry on all pre-disease performance without restriction] : Status 0 - Fully active, able to carry on all pre-disease performance without restriction [Normal] : affect appropriate [de-identified] : anicteric

## 2025-06-05 NOTE — REVIEW OF SYSTEMS
[Lower Ext Edema] : lower extremity edema [Diarrhea: Grade 0] : Diarrhea: Grade 0 [Hot Flashes] : hot flashes [Fever] : no fever [Chills] : no chills [Night Sweats] : no night sweats [Fatigue] : no fatigue [Chest Pain] : no chest pain [Shortness Of Breath] : no shortness of breath [Cough] : no cough [Abdominal Pain] : no abdominal pain [Vomiting] : no vomiting [Constipation] : no constipation [Dysuria] : no dysuria [Incontinence] : no incontinence [Joint Pain] : no joint pain [Joint Stiffness] : no joint stiffness [Muscle Pain] : no muscle pain [Muscle Weakness] : no muscle weakness [Dizziness] : no dizziness [Easy Bleeding] : no tendency for easy bleeding [Easy Bruising] : no tendency for easy bruising

## 2025-06-05 NOTE — HISTORY OF PRESENT ILLNESS
[Disease: _____________________] : Disease: [unfilled] [T: ___] : T[unfilled] [N: ___] : N[unfilled] [AJCC Stage: ____] : AJCC Stage: [unfilled] [de-identified] : 59 year old male who is presenting for initial medical oncology evaluation for prostate adenocarcinoma. He noted a significant rise in his PSA to 6.23 on 2/6/24 (previous 3.71 on 1/20/23 and 1.11 on 4/1/16). MRI 4/12/24 showed 4.4 x 2.9 x 4.1 cm = 27 cc gland, with dominant lesions in the left posterior medial-lateral (PZpm-pl) base-to-midgland peripheral zone (1.6 cm, IA-5), with concern for capsular involvement but no clear EPE, abutting the left NVB and SV without clear invasion, and left posterior medial (PZpm) [id number] peripheral zone lesion extending to the midline posterior urethra at the apex (0.9 cm, IA-4). No LAD. No major anatomic issues. Prostate biopsy 4/23/24 showed right posterior lateral base G 3+4 with 50% involvement, MRI target#1 left PZ G 4+3 among 3 cores with 60%, 55% and 55% involvement with intraductal component, MRI #2 target left PZ G 3+4 among 2 cores with 85% and 55% involvement with intraductal and cribriform component. (small prostate so limited number of sites per Dr. Umaña). PSMA PET 5/14/24 showed increased radiotracer activity in the left side of the prostate gland suggests intermediate PSA expression, with compatible metastatic disease indicated by 0.6x0.3cm left para-aortic/common iliac node SUV 7.0, a 5mm left common iliac node SUV 5.8, a 0.6x0.6cm left external iliac node SUV 10.1, PSA 7.96 and Testosterone on 272 5/20/24. He was started on Orgovyx by urology on 5/21/24   Interval History: Overall he has been doing well. He denies any issues with urination. No constipation or diarrhea. He reports that he is great. He remains active and goes to the gym 6 days per week, as well as, golf a couple days.    PMH/PSH: Appendectomy, hernia repair, testicular torsion repair, vasectomy, cervical spinal fusion; HTN (Edarbi, Amlodopine) /HLD (atorvastin)   Social: Never smoker, occasional alcohol use. 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 Has six children (age 31- 15 years)  Family History: Maternal Uncle with hx of bladder cancer    6/12/24: Patient is more fatigued than normal, says he that he normally goes to bed pretty early and is finding he wants to go to bed even earlier. He does note that since stopping  orgovyx and starting lupron and stephanie/pred the fatigue has actually improved. He was exhausted when taking orgovyx. He is having hot flashes throughout the day, says they are tolerable. Feels his metabolism may have slowed a bit, however has also been going to a lot of events (graduation parties etc) at which he is eating outside of his normal diet. He reports nocturia x 4, increased from 2-3. Endorses decreased libido, does find he is able to have erection with cialis. Has h/o cervical stenosis/fusion and had C2-C6 nerve block yesterday.    7/3/24: LFTs 6/28 gr II elevation, abiraterone held on 7/1, continued on prednisone. Overall patient has been feeling well, he has been motivated to exercise more due to the fact that he is on ADT and wants to counteract increased calorie intake / easier weight gain. He is doing his best to push through fatigue at certain times, but also takes a naps. He continues to follow with pain management and is planned for cervical nerve ablation. Ongoing vasomotor symptoms, says that a few nights ago he was up every hour with hot flashes, this is not the norm, discussed potential gabapentin, however patient has been on this medication previously and does not wish to start right now. Notes some urinary urgency and nocturia 2-4. Endorse little to no libido, does not think he has gotten spontaneous erections.    7/24/24 reports moderate fatigue, hot flash and sweating 10 times, loss of energy. Has normal urination.   8/16/24 pt is in general well-being. Appetite is good. maintaining weight. Exercise 1-2 times a day [work-out]. Continues to endorse moderate fatigue and drinks coffee which helps keep him active and about for about 1 hour. Hot flashes is a bit less but more often. No swelling or joint pain which he follows locally for management.  9/6/24 pt is doing well. Appetite is good and maintaining weight. BM is normal. Continues to exercise 1-2 times daily. Continues to endorse moderate fatigue and mild hot flash with occ sleep interruption.   10/14/24 reports mod fatigue, fluctuating hot flash and sweating  11/5/24 - continues on abiraterone 750mg daily. Overall feeling fatigued, depressed, gaining weight. Uncle passed away, another family member in the ICU. Having episodes of crying and feeling depressed, no SI. Ongoing hot flashes. Urine flow is "fine", no urgency, nocturia 3-5x/night. Occasional BLE edema. Had the spacer placed for RT, more discomfort. Denies fever, chills, night sweats, headache, dizziness, chest pain, palpitations, SOB, cough, nausea/vomiting, diarrhea/constipation, abdominal pain, dysuria, hematuria, incontinence, bleeding, muscle or joint pain/weakness. [de-identified] : G7(4+3) disease in intraductal component, 3/8 sampled sites  [de-identified] : 11/26/24 - abiraterone discontinued early Nov 2024 d/t grade 4 transaminitis. Main issue is perineal pain since spacer gel placement. He saw Dr. Angel yesterday and started Augmentin for suspected infection. MRI done today shows an abscess between the prostate and rectum. No actual fevers but has been taking Motrin ATC with subjective fevers and shaking chills at times. Denies chest pain, palpitations, SOB, cough, nausea/vomiting, diarrhea/constipation, abdominal pain.   12/20/24 14/28 fractions on 12/16 for RT, reports mild fatigue, moderate hot flash and sweating. Nocturia 3 to 4.Completed augmentin yesterday about 3 week course for his perirectal abscess.   2/6/25 - RT completed 1/8/25. Feeling " a lot better". No fatigue, feeling more like himself. Ongoing hot flashes. Urine flow is "fine", no urgency, nocturia 4-5x/night. Occasional perineal "twinges".   4/7/25 - Feeling "good", back to good energy level. Ongoing hot flashes. Urine flow is "fine", rare urgency, nocturia "quite a few". Ongoing BLE edema, saw vascular with no significant findings, improved with compression socks. Denies fever, chills, night sweats, headache, dizziness, chest pain, palpitations, SOB, cough, nausea/vomiting, diarrhea/constipation, abdominal pain, dysuria, hematuria, incontinence, bleeding, muscle or joint pain/weakness.  6/4/25 reports moderate fatigue, 4 episodes of hot flashes and sweating at night. Has normal urination. Nocturia 3.

## 2025-06-05 NOTE — REASON FOR VISIT
[Follow-Up Visit] : a follow-up [Home] : at home, [unfilled] , at the time of the visit. [Medical Office: ([facility])___] : at the medical office located in  [Telehealth (audio & video)] : This visit was provided via telehealth using real-time 2-way audio visual technology. [Verbal consent obtained from patient] : the patient, [unfilled] [FreeTextEntry2] : Prostate Cancer

## 2025-06-20 NOTE — PROCEDURE
[FreeTextEntry1] :   CERVICAL MEDIAL BRANCH BLOCK     The patient was placed in the lateral  position on the fluoroscopic table with a pillow under the abdomen.  Routine monitors were applied.  The back was draped in the usual sterile fashion and sterile technique was adhered to throughout the entire procedure.       The [LEFT] []  C4, C5 and C6 levels were identified under fluoroscopic guidance.  Under fluoroscopy control, the waists of the articular pillars were identified and marked.  Local anesthesia infiltrated subcutaneously and deep extending down toward these previously marked points.  Once the anesthesia was established, a 2, 22-gauge needle was placed in contact with the waists of the articular pillars at the affected levels that were previously mentioned.  This was done under direct fluoroscopic control with PA views initially for orientation utilizing a gun barrel technique and then a lateral view to determine the depth of the needle.  The needle tip was positioned such that the tip was at the posterior aspect of the articular pillar's waist and was then incrementally advanced until the tip was at the center of the pedicle, where the medial branch lies.  For C4 to C6, the medial branch is along the ventral aspect of a line that connects the greatest nitin-posterior diameter of the articular pillar but remains dorsal to the foramen as seen on lateral imaging.  0.5ml of 0. 7 5% Bupivacaine was injected at each level, for a total of 1.5ml.        The patient tolerated the procedure well, the needles were removed and a bandaid was applied.  All unused medications were wasted

## 2025-06-23 NOTE — HISTORY OF PRESENT ILLNESS
[Neck Pain] : neck pain [___ yrs] : [unfilled] year(s) ago [5] : an average pain level of 5/10 [8] : a maximum pain level of 8/10 [Home] : at home, [unfilled] , at the time of the visit. [Medical Office: ([facility])___] : at the medical office located in  [Verbal consent obtained from patient] : the patient, [unfilled] [FreeTextEntry1] : Interval Note: sp LEFT C4, 5, 6 medial branch block 6/20/25 with sustained 80% improvement in left neck and shoulder pain.  not utilizing analgesics at this time   Denies any additional weakness, numbness, bowel/bladder dysfunction.     HPI  Mr. GARO GARDUNO is a 60 year M with pmhx of htn, prostate ca following with Dr. Krystal liao radiating treatment, lupron,  hlp sp C6-7 ACDF 11/23 with Dr. Schwab reports 1 year of pain over left neck, shoulder, trapezius region and lateral arm and fingers.  Pain is so bad that patient finds it difficult to perform adls.  Patient is very active and previously exercised extensively. denies any worsening numbness, weakness, bowel/bladder dysfunction.     Previous and current pain medications/doses/effects:  diclofenac flexeril  Previous Pain Treatments:  PT Chiropractic care Acupuncture  Previous Pain Injections: LEFT C4, 5, 6 medial branch block 6/20/25  Preblock pain score: 10 Postblock pain score 2 LEFT C4, 5, 6 medial branch block 6/25/24   LEFT C4, 5, 6 medial branch block 6/11/24 C7-T1 interlaminar epidural steroid injection 7//28/23 C7-T1 interlaminar epidural steroid injection 2/10/23 C7-T1 interlaminar epidural steroid injection 8/8/22 C7-T1 epidural steroid injection 12/16/21 Cervical epidural steroid injection x 2 1/21 with transient relief  Previous Diagnostic Studies/Images:  MRI CS 5/25  C1/2: No central canal narrowing. C2/C3: Moderate right facet hypertrophic changes with a right lateral recess/foraminal disc protrusion. Moderate right foraminal narrowing. No central canal or left foraminal narrowing. C3/C4: Moderate facet arthrosis. Moderate bilateral foraminal narrowing. Mild central canal narrowing. C4/C5: Mild facet arthrosis. Moderate foraminal narrowing. No central canal or left foraminal narrowing. C5/C6: Mild osseous ridging and disc bulging. Right foraminal disc osteophyte complex. Moderate right foraminal narrowing. Mild flattening the right ventral spinal cord. Moderate central canal narrowing. C6/C7: Mild spurring into the foramina. Moderate left and mild to moderate foraminal narrowing. No central canal narrowing. C7/T1: Mild spurring into the foramen with mild right and moderate left foraminal narrowing. No central canal narrowing.  ALIGNMENT: Mild straightening of the cervical spine. CORD: No signal abnormality appreciated within the spinal cord. No abnormal enhancement in the spinal cord. MARROW: Fusion hardware at C6-7 anteriorly limited by susceptibility artifact. Modic type II changes at C3-4 and at the inferior endplate of C2. IMAGED BRAIN: Cervicomedullary junction is intact. PERIPHERAL/NECK SOFT TISSUES: Small nodule in the right thyroid gland.  IMPRESSION: 1.  Status post ACDF at C6-7. Similar to prior study. 2.  Multilevel spondylosis as detailed above. Similar to prior study. 3.  Small nodule on the right thyroid gland for which correlation with thyroid sonogram is recommended.  MRI CS 2/24  Patient is now status post anterior cervical discectomy and fusion from C6 to C7 using an anterior plate secured by bilateral lateral body screws. No evidence for hardware complication.  There is normal cervical lordosis. No subluxation. Vertebral body heights are maintained. Bone marrow signal is unremarkable. No prevertebral soft tissue swelling.  The cervical cord is normal in size and signal characteristics. Visualized portion of the posterior fossa is unremarkable.  C2-C3 level: Broad-based posterior disc bulge with superimposed right paracentral protrusion in conjunction with mild lateral facet arthropathy results in mild right neural foraminal narrowing but no significant left neural foraminal narrowing or central canal narrowing.  C3-C4 level: Small broad-based posterior disc bulge and mild bilateral facet arthropathy result in mild left neural foraminal narrowing, mild to moderate right neural foraminal narrowing but no significant central canal narrowing.  C4-C5 level:  No disc herniation. No central canal or foraminal narrowing.   C5-C6 level: Broad-based posterior disc bulge with superimposed right paracentral protrusion in conjunction with bilateral facet arthropathy results in mild to moderate right neural foraminal narrowing, mild left neural foraminal narrowing and mild central canal narrowing.  C6-C7 level: Mild bilateral facet arthropathy and posterior osseous ridging contribute to mild bilateral neural foraminal narrowing but no significant central canal narrowing.  C7-T1 level: No disc herniation. No central canal or foraminal narrowing.   In the visualized upper thoracic spine there is no disc herniation or central canal narrowing.   IMPRESSION:  Interval anterior cervical discectomy and fusion at C6-C7 with resolved central canal narrowing at this level.  Discogenic degenerative disease and facet arthropathy of the cervical spine, most pronounced at C5-C6 where there is mild central canal narrowing, mild to moderate right neural foraminal narrowing and mild left neural foraminal narrowing. Additional varying degrees of neural foraminal narrowing, as above. These findings are not significantly changed as compared to prior examination.  --- End of Report ---       ED GARRETT MBA-JAZLYN DONAHUE; Attending Radiologist This document has been electronically signed. Feb 29 2024 11:38AM  MRI CS 10/23  C1/2: Moderate arthrosis between the dens and the anterior arch of C1. C2/C3: Small to moderate posterior disc protrusion that is asymmetric to the right with associated uncovertebral spurring causing moderate right foraminal narrowing. Disc indents the ventral thecal sac but does not contact the cord. C3/C4: Small to moderate posterior disc protrusion with posterior osteophyte formation indenting the ventral thecal sac and nearly contacting the cord. Moderate to severe right and moderate left foraminal narrowing. C4/C5: Minimal disc bulging without spinal canal or foraminal narrowing. C5/C6: Moderate sized posterior disc protrusion that indents the ventral thecal sac and contacts the right aspect of the cord. Mild to moderate bilateral foraminal narrowing. C6/C7: Mild retrolisthesis with a small to moderate posterior disc protrusion with posterior osteophyte formation indenting the ventral thecal sac without contact upon the cord. Severe left and moderate to severe right foraminal narrowing. C7/T1: Normal  ALIGNMENT: Mild retrolisthesis at C3-C4 and C6-C7. CORD: There is no cord signal abnormality. MARROW: Right-sided endplate marrow edema. IMAGED BRAIN: Normal PERIPHERAL/NECK SOFT TISSUES: Normal  IMPRESSION: Multilevel cervical spondylosis most prominent at C2-C3, C3-C4 as well as at C5-C6 and C6-C7. Spinal canal and foraminal narrowing as detailed above and most advanced bilaterally at C6-C7.  These findings are minimally progressed compared to the prior study from 6/18/2021   MRI CS 6/21  C1/2: No central canal narrowing. C2/C3: Facet and uncovertebral hypertrophy with moderate to severe right and mild left foraminal narrowing. No central canal narrowing. C3/C4: Osseous ridging effacing ventral thecal sac. Facet and uncovertebral hypertrophy results in moderate to severe right and moderate left foraminal narrowing. C4/C5: Moderate facet degenerative change. Mild bilateral uncovertebral hypertrophy. Moderate right and mild to moderate left foraminal narrowing. No central canal narrowing. C5/C6: Posterior osseous ridging with a superimposed central/right paracentral disc protrusion. Mild to moderate bilateral foraminal narrowing. Mild central canal narrowing. C6/C7: Posterior osseous ridging effaces the ventral thecal sac. Left foraminal disc osteophyte complex. Severe left foraminal narrowing. Moderate right foraminal narrowing. Mild central canal narrowing. C7/T1: No central canal or foraminal narrowing. T1/T2: No central canal or foraminal narrowing.  ALIGNMENT: Mild straightening of the cervical spine. Mild varying levels of disc space narrowing. CORD: No abnormal signal in the spinal cord. MARROW: There is edema at the inferior endplate of C6 and superior endplate of C7 on the left in keeping with mild Modic type I changes. No fracture identified. IMAGED BRAIN: Cervicomedullary junction is intact. PERIPHERAL/NECK SOFT TISSUES: Symmetric appearance of the paraspinal musculature.  IMPRESSION: Moderate to severe multilevel spondylosis. Mild central canal narrowing at C5-6 and C6-7 as above. Severe left foraminal narrowing with left foraminal disc osteophyte complex at C6-7. Additional foraminal narrowing described above.  MRI TS 2/21  T5-8 disc herniation deforming the thecal sac with T5-6 cord abutment.  T6-7 and T7-8 left paracentral orientation

## 2025-06-23 NOTE — ASSESSMENT
[FreeTextEntry1] : >> Imaging and Other Studies  I personally reviewed the relevant imaging. Discussed and explained to patient the likely source of pathology and pain. Questions answered. MRI XR  >> Therapy and Other Modalities  PT  >> Medications   >> Interventions  MRI demonstrating disc herniation causing radiculopathy, significantly impactful to ability to perform ADL and refractory to conservative treatments. sp C7-T1 interlaminar epidural steroid injection with improvement for 12 months with improvement in function x 2     significant component of cervical pain likely secondary to spondylosis demonstrated on MRI CS, refractory to conservative treatments, sp diagnostic LEFT C4, 5, 6 medial branch block with 100% improvement in neck and shoulder pain with sustained improvement  >> Consults  patient to fu care for thyroid nodule continue care with oncology  >> Discussion of Risks/Benefits/Alternatives   >Regarding any scheduled procedures:  I have discussed in detail with the patient that any interventional pain procedure is associated with potential risks. The procedure may include an injection of steroids and potentially other medications (local anesthetic and normal saline) into the epidural space or surrounding tissue of the spine. There are significant risks of this procedure which include and are not limited to infection, bleeding, worsening pain, dural puncture leading to postdural puncture headache, nerve damage, spinal cord injury, paralysis, stroke, and death.  There is a chance that the procedure does not improve their pain.  There are risks associated with the steroid being absorbed into the body systemically. These include dysphoria, difficulty sleeping, mood swings and personality changes. Premenopausal women may notice an irregularity in her menstrual cycle for 2-3 months following the injection. Steroids can specifically affect patients with hypertension, diabetes, and peptic ulcers. The procedure may cause a temporary increase in blood pressure and blood pressure, and may adversely affect a peptic ulcer. Other, more rare complications, include avascular necrosis of joints, glaucoma and worsening of osteoporosis.  I have discussed the risks of the procedure at length with the patient, and the potential benefits of pain relief. I have offered alternatives to the procedure. All questions were answered.  The patient expressed understanding and wishes to proceed with the procedure.   > Longitudinal management of Complex Painful condition   The patient is being managed for a complex condition that requires ongoing management.  The nature of this condition demands nuanced approach to treatment.  The seriousness of the condition necessitates an in-depth and focused approach to management and coordination with other healthcare professionals.    This visit involves intricate evaluation and management of the patient's condition.  The complexity of the visit was due to the need for detailed assessment of the current state, consideration of potential complications and a careful balancing of treatment options to management the chronic condition effectively.   As detailed above, the patient has a chronic significant painful condition that requires regular and detailed management.  The condition's impact on the patient's quality of life and health is substantial and necessitates a comprehensive and tailored approach   >> Conclusion   There were no barriers to communication. Informed patient that I would be available for any additional questions. Patient was instructed to call with any worsening symptoms including severe pain, new numbness/weakness, or changes in the bowel/bladder function. Discussed role of nsaids in pain management and all relevant risks, if patient is continuing to require after 4 weeks the patient should f/u for alternative treatment. Instructed patient to maintain pain diary to monitor pain level, mobility, and function.  I explained to patient benefits and limitation of TeleMedicine visits  Patient understands that limitations include inability to perform comprehensive physical exam, which may lead to potential diagnostic inconsistencies.    Any scheduled procedures are based on history, imaging and limited physical exam performed on TeleHealth visit.  If necessary, additional focal physical exam will be performed on date of procedure  Patient understands that diagnosis and treatment may be limited by these inconsistencies and patient agrees to proceed with care plan